# Patient Record
Sex: MALE | Race: WHITE | NOT HISPANIC OR LATINO | Employment: UNEMPLOYED | URBAN - METROPOLITAN AREA
[De-identification: names, ages, dates, MRNs, and addresses within clinical notes are randomized per-mention and may not be internally consistent; named-entity substitution may affect disease eponyms.]

---

## 2019-10-02 ENCOUNTER — OFFICE VISIT (OUTPATIENT)
Dept: FAMILY MEDICINE CLINIC | Facility: CLINIC | Age: 49
End: 2019-10-02
Payer: COMMERCIAL

## 2019-10-02 VITALS
BODY MASS INDEX: 26.98 KG/M2 | WEIGHT: 188 LBS | TEMPERATURE: 96.4 F | DIASTOLIC BLOOD PRESSURE: 84 MMHG | HEART RATE: 72 BPM | OXYGEN SATURATION: 94 % | SYSTOLIC BLOOD PRESSURE: 124 MMHG

## 2019-10-02 DIAGNOSIS — L40.9 PSORIASIS: ICD-10-CM

## 2019-10-02 DIAGNOSIS — J20.9 ACUTE BRONCHITIS, UNSPECIFIED ORGANISM: Primary | ICD-10-CM

## 2019-10-02 PROCEDURE — 99213 OFFICE O/P EST LOW 20 MIN: CPT | Performed by: PHYSICIAN ASSISTANT

## 2019-10-02 RX ORDER — TRIAMCINOLONE ACETONIDE 5 MG/G
CREAM TOPICAL 3 TIMES DAILY
Qty: 30 G | Refills: 3 | Status: SHIPPED | OUTPATIENT
Start: 2019-10-02 | End: 2020-03-27 | Stop reason: SDUPTHER

## 2019-10-02 RX ORDER — AMOXICILLIN AND CLAVULANATE POTASSIUM 875; 125 MG/1; MG/1
1 TABLET, FILM COATED ORAL EVERY 12 HOURS SCHEDULED
Qty: 20 TABLET | Refills: 0 | Status: SHIPPED | OUTPATIENT
Start: 2019-10-02 | End: 2019-10-12

## 2019-10-02 RX ORDER — FLUTICASONE PROPIONATE 110 UG/1
2 AEROSOL, METERED RESPIRATORY (INHALATION) 2 TIMES DAILY
Qty: 1 INHALER | Refills: 1 | Status: SHIPPED | OUTPATIENT
Start: 2019-10-02 | End: 2019-11-19 | Stop reason: SDUPTHER

## 2019-10-02 NOTE — PROGRESS NOTES
Assessment/Plan:       Diagnoses and all orders for this visit:    Acute bronchitis, unspecified organism  -     amoxicillin-clavulanate (AUGMENTIN) 875-125 mg per tablet; Take 1 tablet by mouth every 12 (twelve) hours for 10 days  -     fluticasone (FLOVENT HFA) 110 MCG/ACT inhaler; Inhale 2 puffs 2 (two) times a day Rinse mouth after use  Psoriasis  -     triamcinolone (KENALOG) 0 5 % cream; Apply topically 3 (three) times a day            Subjective:      Patient ID: Donta Noonan is a 44 y o  male  Cough   This is a new problem  The current episode started 1 to 4 weeks ago  The problem has been gradually worsening  The problem occurs every few minutes  The cough is productive of purulent sputum  Associated symptoms include chest pain, chills, ear congestion, a fever, headaches, nasal congestion, postnasal drip, a sore throat, shortness of breath and sweats  Pertinent negatives include no rhinorrhea  The symptoms are aggravated by other  He has tried OTC cough suppressant for the symptoms  The treatment provided mild relief  His past medical history is significant for bronchitis  Fatigue   This is a new problem  The current episode started 1 to 4 weeks ago  The problem occurs daily  The problem has been unchanged  Associated symptoms include chest pain, chills, congestion, coughing, fatigue, a fever, headaches and a sore throat  Pertinent negatives include no abdominal pain, arthralgias, change in bowel habit, nausea, neck pain, vertigo or visual change  The symptoms are aggravated by coughing  He has tried rest for the symptoms  The treatment provided no relief  The following portions of the patient's history were reviewed and updated as appropriate:   He has no past medical history on file  ,  does not have any pertinent problems on file  ,   has no past surgical history on file  ,  family history is not on file  ,   has no tobacco, alcohol, and drug history on file  ,  has No Known Allergies     Current Outpatient Medications   Medication Sig Dispense Refill    amoxicillin-clavulanate (AUGMENTIN) 875-125 mg per tablet Take 1 tablet by mouth every 12 (twelve) hours for 10 days 20 tablet 0    fluticasone (FLOVENT HFA) 110 MCG/ACT inhaler Inhale 2 puffs 2 (two) times a day Rinse mouth after use  1 Inhaler 1    triamcinolone (KENALOG) 0 5 % cream Apply topically 3 (three) times a day 30 g 3     No current facility-administered medications for this visit  Review of Systems   Constitutional: Positive for chills, fatigue and fever  HENT: Positive for congestion, postnasal drip and sore throat  Negative for rhinorrhea and trouble swallowing  Eyes: Negative for pain  Respiratory: Positive for cough, chest tightness and shortness of breath  Cardiovascular: Positive for chest pain  Negative for palpitations and leg swelling  Gastrointestinal: Negative for abdominal pain, change in bowel habit and nausea  Genitourinary: Negative for difficulty urinating  Musculoskeletal: Negative for arthralgias and neck pain  Neurological: Positive for headaches  Negative for dizziness, vertigo and light-headedness  Hematological: Negative  Psychiatric/Behavioral: Negative  Objective:  Vitals:    10/02/19 0912   BP: 124/84   Pulse: 72   Temp: (!) 96 4 °F (35 8 °C)   SpO2: 94%   Weight: 85 3 kg (188 lb)     Body mass index is 26 98 kg/m²  Physical Exam   Constitutional: He is oriented to person, place, and time  He appears well-developed and well-nourished  He appears ill  HENT:   Head: Normocephalic  Right Ear: External ear normal    Left Ear: External ear normal    Nose: Nose normal    Mouth/Throat: Oropharynx is clear and moist    Eyes: Conjunctivae are normal    Neck: Normal range of motion  Cardiovascular: Normal rate, regular rhythm and normal heart sounds  Pulmonary/Chest: Effort normal  He has wheezes  Musculoskeletal: Normal range of motion  Lymphadenopathy:     He has no cervical adenopathy  Neurological: He is alert and oriented to person, place, and time  Skin: Skin is warm and dry  Psychiatric: He has a normal mood and affect  His behavior is normal    Nursing note and vitals reviewed

## 2019-11-19 DIAGNOSIS — J20.9 ACUTE BRONCHITIS, UNSPECIFIED ORGANISM: ICD-10-CM

## 2019-11-19 RX ORDER — DEXAMETHASONE 4 MG/1
TABLET ORAL
Qty: 12 G | Refills: 1 | Status: SHIPPED | OUTPATIENT
Start: 2019-11-19 | End: 2020-01-10

## 2020-01-10 DIAGNOSIS — J20.9 ACUTE BRONCHITIS, UNSPECIFIED ORGANISM: ICD-10-CM

## 2020-01-10 RX ORDER — DEXAMETHASONE 4 MG/1
TABLET ORAL
Qty: 12 G | Refills: 0 | Status: SHIPPED | OUTPATIENT
Start: 2020-01-10 | End: 2020-02-13

## 2020-02-13 DIAGNOSIS — J20.9 ACUTE BRONCHITIS, UNSPECIFIED ORGANISM: ICD-10-CM

## 2020-02-13 RX ORDER — DEXAMETHASONE 4 MG/1
TABLET ORAL
Qty: 12 G | Refills: 0 | Status: SHIPPED | OUTPATIENT
Start: 2020-02-13 | End: 2020-03-17

## 2020-03-17 DIAGNOSIS — J20.9 ACUTE BRONCHITIS, UNSPECIFIED ORGANISM: ICD-10-CM

## 2020-03-17 RX ORDER — DEXAMETHASONE 4 MG/1
TABLET ORAL
Qty: 12 G | Refills: 0 | Status: SHIPPED | OUTPATIENT
Start: 2020-03-17 | End: 2020-04-14

## 2020-03-27 ENCOUNTER — TELEMEDICINE (OUTPATIENT)
Dept: FAMILY MEDICINE CLINIC | Facility: CLINIC | Age: 50
End: 2020-03-27
Payer: COMMERCIAL

## 2020-03-27 DIAGNOSIS — E78.2 MIXED HYPERLIPIDEMIA: ICD-10-CM

## 2020-03-27 DIAGNOSIS — R53.83 OTHER FATIGUE: ICD-10-CM

## 2020-03-27 DIAGNOSIS — L40.9 PSORIASIS: ICD-10-CM

## 2020-03-27 DIAGNOSIS — G47.09 OTHER INSOMNIA: ICD-10-CM

## 2020-03-27 DIAGNOSIS — Z12.11 SCREENING FOR COLON CANCER: ICD-10-CM

## 2020-03-27 DIAGNOSIS — K21.9 GASTROESOPHAGEAL REFLUX DISEASE WITHOUT ESOPHAGITIS: Primary | ICD-10-CM

## 2020-03-27 PROBLEM — J20.9 ACUTE BRONCHITIS: Status: RESOLVED | Noted: 2019-10-02 | Resolved: 2020-03-27

## 2020-03-27 PROCEDURE — G2062 QUAL NONMD EST PT 11-20M: HCPCS | Performed by: PHYSICIAN ASSISTANT

## 2020-03-27 RX ORDER — TRIAMCINOLONE ACETONIDE 5 MG/G
CREAM TOPICAL 3 TIMES DAILY
Qty: 30 G | Refills: 3 | Status: SHIPPED | OUTPATIENT
Start: 2020-03-27 | End: 2021-09-20 | Stop reason: SDUPTHER

## 2020-03-27 RX ORDER — OMEPRAZOLE 20 MG/1
20 CAPSULE, DELAYED RELEASE ORAL
Qty: 30 CAPSULE | Refills: 5 | Status: SHIPPED | OUTPATIENT
Start: 2020-03-27 | End: 2020-09-24

## 2020-03-27 NOTE — PROGRESS NOTES
Virtual Regular Visit    Problem List Items Addressed This Visit        Digestive    GERD (gastroesophageal reflux disease) - Primary    Relevant Medications    omeprazole (PriLOSEC) 20 mg delayed release capsule       Musculoskeletal and Integument    Psoriasis    Relevant Medications    triamcinolone (KENALOG) 0 5 % cream    Other Relevant Orders    CBC and differential       Other    Hyperlipidemia    Relevant Orders    Comprehensive metabolic panel    Lipid Panel with Direct LDL reflex    Insomnia      Other Visit Diagnoses     Screening for colon cancer        Relevant Orders    Ambulatory referral to Gastroenterology               Reason for visit is follow-up of medications    Encounter provider Alfonza Lesch, PA-C    Provider located at [facility] [address] [facility]  [address] [place] [address],[address] [place] [address] [phone]      Recent Visits  No visits were found meeting these conditions  Showing recent visits within past 7 days and meeting all other requirements     Today's Visits  Date Type Provider Dept   03/27/20 Telemedicine Alfonza Lesch, PA-C  [facility] [address] [place]   Showing today's visits and meeting all other requirements     Future Appointments  No visits were found meeting these conditions  Showing future appointments within next 150 days and meeting all other requirements        After connecting through [employer], the patient was identified by name and date of birth  Tricia Stanley was informed that this is a telemedicine visit and that the visit is being conducted through [employer] [employer] and patient was informed that this is not a secure, HIPAA-complaint platform  he agrees to proceed  which may not be secure and therefore, might not be HIPAA-compliant  My office door was closed  No one else was in the room  He acknowledged consent and understanding of privacy and security of the video platform   The patient has agreed to participate and understands they can discontinue the visit at any time  Subjective  Dossie Kanner is a 44 y o  male patient is for follow-up of medications  Patient has noticed that he is a little bit more tired and has some groin pain  He did have a tick bite that he removed the tick easily but since then he has felt tired  The groin pain is worse when he is coughing and he has had another episode of a bad cough over the past several weeks that has improved  His heartburn and indigestion is worsening and he would like to go back on omeprazole  He is taking Z Quil at night and has been sleeping better  No past medical history on file  No past surgical history on file  Current Outpatient Medications   Medication Sig Dispense Refill    FLOVENT  MCG/ACT inhaler inhale 2 puffs by mouth and INTO THE LUNGS twice a day (RINSE MOUTH AFTER USE) 12 g 0    triamcinolone (KENALOG) 0 5 % cream Apply topically 3 (three) times a day 30 g 3    omeprazole (PriLOSEC) 20 mg delayed release capsule Take 1 capsule (20 mg total) by mouth daily before breakfast 30 capsule 5     No current facility-administered medications for this visit  No Known Allergies    Review of Systems   Constitutional: Positive for fatigue  Negative for activity change, appetite change, chills and fever  HENT: Positive for congestion and postnasal drip  Negative for ear pain, sinus pressure, sore throat and trouble swallowing  Eyes: Negative for pain  Respiratory: Positive for cough  Negative for chest tightness and shortness of breath  Cardiovascular: Negative for chest pain and palpitations  Gastrointestinal: Negative for constipation, diarrhea and nausea  Groin pain   Genitourinary: Negative for difficulty urinating  Musculoskeletal: Negative for arthralgias, back pain and neck pain  Skin: Negative for rash  Neurological: Negative for dizziness, light-headedness and headaches  Psychiatric/Behavioral: Positive for sleep disturbance  Physical Exam   Constitutional: He is oriented to person, place, and time  He appears well-developed and well-nourished  HENT:   Head: Normocephalic  Right Ear: External ear normal    Left Ear: External ear normal    Neck: Normal range of motion  Cardiovascular: Normal rate and regular rhythm  Pulmonary/Chest: Effort normal    Musculoskeletal: Normal range of motion  He exhibits no edema  Neurological: He is alert and oriented to person, place, and time  Psychiatric: He has a normal mood and affect  His behavior is normal  Judgment and thought content normal         I spent 15 minutes with the patient during this visit

## 2020-04-14 DIAGNOSIS — J20.9 ACUTE BRONCHITIS, UNSPECIFIED ORGANISM: ICD-10-CM

## 2020-04-14 RX ORDER — DEXAMETHASONE 4 MG/1
TABLET ORAL
Qty: 12 G | Refills: 0 | Status: SHIPPED | OUTPATIENT
Start: 2020-04-14 | End: 2020-05-11

## 2020-04-21 ENCOUNTER — APPOINTMENT (OUTPATIENT)
Dept: LAB | Facility: CLINIC | Age: 50
End: 2020-04-21
Payer: COMMERCIAL

## 2020-04-21 DIAGNOSIS — E78.2 MIXED HYPERLIPIDEMIA: ICD-10-CM

## 2020-04-21 DIAGNOSIS — R53.83 OTHER FATIGUE: ICD-10-CM

## 2020-04-21 DIAGNOSIS — L40.9 PSORIASIS: ICD-10-CM

## 2020-04-21 LAB
ALBUMIN SERPL BCP-MCNC: 3.4 G/DL (ref 3.5–5)
ALP SERPL-CCNC: 76 U/L (ref 46–116)
ALT SERPL W P-5'-P-CCNC: 23 U/L (ref 12–78)
ANION GAP SERPL CALCULATED.3IONS-SCNC: 5 MMOL/L (ref 4–13)
AST SERPL W P-5'-P-CCNC: 13 U/L (ref 5–45)
BASOPHILS # BLD AUTO: 0.05 THOUSANDS/ΜL (ref 0–0.1)
BASOPHILS NFR BLD AUTO: 1 % (ref 0–1)
BILIRUB SERPL-MCNC: 0.43 MG/DL (ref 0.2–1)
BUN SERPL-MCNC: 14 MG/DL (ref 5–25)
CALCIUM SERPL-MCNC: 9 MG/DL (ref 8.3–10.1)
CHLORIDE SERPL-SCNC: 110 MMOL/L (ref 100–108)
CHOLEST SERPL-MCNC: 208 MG/DL (ref 50–200)
CO2 SERPL-SCNC: 27 MMOL/L (ref 21–32)
CREAT SERPL-MCNC: 0.9 MG/DL (ref 0.6–1.3)
EOSINOPHIL # BLD AUTO: 0.11 THOUSAND/ΜL (ref 0–0.61)
EOSINOPHIL NFR BLD AUTO: 1 % (ref 0–6)
ERYTHROCYTE [DISTWIDTH] IN BLOOD BY AUTOMATED COUNT: 14.1 % (ref 11.6–15.1)
GFR SERPL CREATININE-BSD FRML MDRD: 98 ML/MIN/1.73SQ M
GLUCOSE P FAST SERPL-MCNC: 91 MG/DL (ref 65–99)
HCT VFR BLD AUTO: 49.8 % (ref 36.5–49.3)
HDLC SERPL-MCNC: 28 MG/DL
HGB BLD-MCNC: 16 G/DL (ref 12–17)
IMM GRANULOCYTES # BLD AUTO: 0.02 THOUSAND/UL (ref 0–0.2)
IMM GRANULOCYTES NFR BLD AUTO: 0 % (ref 0–2)
LDLC SERPL CALC-MCNC: 148 MG/DL (ref 0–100)
LYMPHOCYTES # BLD AUTO: 2.52 THOUSANDS/ΜL (ref 0.6–4.47)
LYMPHOCYTES NFR BLD AUTO: 25 % (ref 14–44)
MCH RBC QN AUTO: 31.4 PG (ref 26.8–34.3)
MCHC RBC AUTO-ENTMCNC: 32.1 G/DL (ref 31.4–37.4)
MCV RBC AUTO: 98 FL (ref 82–98)
MONOCYTES # BLD AUTO: 0.56 THOUSAND/ΜL (ref 0.17–1.22)
MONOCYTES NFR BLD AUTO: 6 % (ref 4–12)
NEUTROPHILS # BLD AUTO: 6.69 THOUSANDS/ΜL (ref 1.85–7.62)
NEUTS SEG NFR BLD AUTO: 67 % (ref 43–75)
NRBC BLD AUTO-RTO: 0 /100 WBCS
PLATELET # BLD AUTO: 221 THOUSANDS/UL (ref 149–390)
PMV BLD AUTO: 10.1 FL (ref 8.9–12.7)
POTASSIUM SERPL-SCNC: 3.9 MMOL/L (ref 3.5–5.3)
PROT SERPL-MCNC: 7 G/DL (ref 6.4–8.2)
RBC # BLD AUTO: 5.09 MILLION/UL (ref 3.88–5.62)
SODIUM SERPL-SCNC: 142 MMOL/L (ref 136–145)
TRIGL SERPL-MCNC: 161 MG/DL
WBC # BLD AUTO: 9.95 THOUSAND/UL (ref 4.31–10.16)

## 2020-04-21 PROCEDURE — 86618 LYME DISEASE ANTIBODY: CPT

## 2020-04-21 PROCEDURE — 36415 COLL VENOUS BLD VENIPUNCTURE: CPT

## 2020-04-21 PROCEDURE — 80053 COMPREHEN METABOLIC PANEL: CPT

## 2020-04-21 PROCEDURE — 80061 LIPID PANEL: CPT

## 2020-04-21 PROCEDURE — 85025 COMPLETE CBC W/AUTO DIFF WBC: CPT

## 2020-04-22 ENCOUNTER — TELEMEDICINE (OUTPATIENT)
Dept: GASTROENTEROLOGY | Facility: CLINIC | Age: 50
End: 2020-04-22
Payer: COMMERCIAL

## 2020-04-22 DIAGNOSIS — Z12.11 COLON CANCER SCREENING: Primary | ICD-10-CM

## 2020-04-22 LAB — B BURGDOR IGG+IGM SER-ACNC: <0.91 ISR (ref 0–0.9)

## 2020-04-22 PROCEDURE — 99211 OFF/OP EST MAY X REQ PHY/QHP: CPT | Performed by: INTERNAL MEDICINE

## 2020-05-09 DIAGNOSIS — J20.9 ACUTE BRONCHITIS, UNSPECIFIED ORGANISM: ICD-10-CM

## 2020-05-11 RX ORDER — DEXAMETHASONE 4 MG/1
TABLET ORAL
Qty: 12 G | Refills: 0 | Status: SHIPPED | OUTPATIENT
Start: 2020-05-11 | End: 2020-06-26

## 2020-05-20 ENCOUNTER — TRANSCRIBE ORDERS (OUTPATIENT)
Dept: GASTROENTEROLOGY | Facility: CLINIC | Age: 50
End: 2020-05-20

## 2020-05-20 DIAGNOSIS — Z20.822 ENCOUNTER FOR LABORATORY TESTING FOR COVID-19 VIRUS: Primary | ICD-10-CM

## 2020-05-29 DIAGNOSIS — Z20.822 ENCOUNTER FOR LABORATORY TESTING FOR COVID-19 VIRUS: ICD-10-CM

## 2020-05-29 PROCEDURE — U0003 INFECTIOUS AGENT DETECTION BY NUCLEIC ACID (DNA OR RNA); SEVERE ACUTE RESPIRATORY SYNDROME CORONAVIRUS 2 (SARS-COV-2) (CORONAVIRUS DISEASE [COVID-19]), AMPLIFIED PROBE TECHNIQUE, MAKING USE OF HIGH THROUGHPUT TECHNOLOGIES AS DESCRIBED BY CMS-2020-01-R: HCPCS

## 2020-05-30 LAB — SARS-COV-2 RNA SPEC QL NAA+PROBE: NOT DETECTED

## 2020-06-02 ENCOUNTER — TRANSCRIBE ORDERS (OUTPATIENT)
Dept: GASTROENTEROLOGY | Facility: CLINIC | Age: 50
End: 2020-06-02

## 2020-06-02 ENCOUNTER — TELEPHONE (OUTPATIENT)
Dept: GASTROENTEROLOGY | Facility: CLINIC | Age: 50
End: 2020-06-02

## 2020-06-02 DIAGNOSIS — Z20.822 ENCOUNTER FOR LABORATORY TESTING FOR COVID-19 VIRUS: Primary | ICD-10-CM

## 2020-06-09 ENCOUNTER — ANESTHESIA (OUTPATIENT)
Dept: GASTROENTEROLOGY | Facility: HOSPITAL | Age: 50
End: 2020-06-09

## 2020-06-09 ENCOUNTER — ANESTHESIA EVENT (OUTPATIENT)
Dept: GASTROENTEROLOGY | Facility: HOSPITAL | Age: 50
End: 2020-06-09

## 2020-06-09 ENCOUNTER — HOSPITAL ENCOUNTER (OUTPATIENT)
Dept: GASTROENTEROLOGY | Facility: HOSPITAL | Age: 50
Setting detail: OUTPATIENT SURGERY
Discharge: HOME | End: 2020-06-09
Attending: INTERNAL MEDICINE | Admitting: INTERNAL MEDICINE
Payer: COMMERCIAL

## 2020-06-09 VITALS
OXYGEN SATURATION: 98 % | SYSTOLIC BLOOD PRESSURE: 115 MMHG | DIASTOLIC BLOOD PRESSURE: 72 MMHG | RESPIRATION RATE: 17 BRPM | TEMPERATURE: 98.4 F | HEART RATE: 62 BPM | HEIGHT: 70 IN | BODY MASS INDEX: 25.75 KG/M2 | WEIGHT: 179.9 LBS

## 2020-06-09 DIAGNOSIS — Z12.11 COLON CANCER SCREENING: ICD-10-CM

## 2020-06-09 LAB — SARS-COV-2 RNA RESP QL NAA+PROBE: NEGATIVE

## 2020-06-09 PROCEDURE — 45385 COLONOSCOPY W/LESION REMOVAL: CPT | Performed by: INTERNAL MEDICINE

## 2020-06-09 PROCEDURE — 88305 TISSUE EXAM BY PATHOLOGIST: CPT | Performed by: PATHOLOGY

## 2020-06-09 PROCEDURE — 87635 SARS-COV-2 COVID-19 AMP PRB: CPT | Performed by: ANESTHESIOLOGY

## 2020-06-09 RX ORDER — PROPOFOL 10 MG/ML
INJECTION, EMULSION INTRAVENOUS AS NEEDED
Status: DISCONTINUED | OUTPATIENT
Start: 2020-06-09 | End: 2020-06-09 | Stop reason: SURG

## 2020-06-09 RX ORDER — LIDOCAINE HYDROCHLORIDE 10 MG/ML
INJECTION, SOLUTION EPIDURAL; INFILTRATION; INTRACAUDAL; PERINEURAL AS NEEDED
Status: DISCONTINUED | OUTPATIENT
Start: 2020-06-09 | End: 2020-06-09 | Stop reason: SURG

## 2020-06-09 RX ORDER — SODIUM CHLORIDE, SODIUM LACTATE, POTASSIUM CHLORIDE, CALCIUM CHLORIDE 600; 310; 30; 20 MG/100ML; MG/100ML; MG/100ML; MG/100ML
INJECTION, SOLUTION INTRAVENOUS CONTINUOUS PRN
Status: DISCONTINUED | OUTPATIENT
Start: 2020-06-09 | End: 2020-06-09 | Stop reason: SURG

## 2020-06-09 RX ADMIN — PROPOFOL 50 MG: 10 INJECTION, EMULSION INTRAVENOUS at 11:13

## 2020-06-09 RX ADMIN — LIDOCAINE HYDROCHLORIDE 50 MG: 10 INJECTION, SOLUTION EPIDURAL; INFILTRATION; INTRACAUDAL; PERINEURAL at 11:12

## 2020-06-09 RX ADMIN — PROPOFOL 50 MG: 10 INJECTION, EMULSION INTRAVENOUS at 11:15

## 2020-06-09 RX ADMIN — PROPOFOL 100 MG: 10 INJECTION, EMULSION INTRAVENOUS at 11:12

## 2020-06-09 RX ADMIN — PROPOFOL 50 MG: 10 INJECTION, EMULSION INTRAVENOUS at 11:20

## 2020-06-09 RX ADMIN — SODIUM CHLORIDE, SODIUM LACTATE, POTASSIUM CHLORIDE, AND CALCIUM CHLORIDE: .6; .31; .03; .02 INJECTION, SOLUTION INTRAVENOUS at 10:57

## 2020-06-09 RX ADMIN — PROPOFOL 50 MG: 10 INJECTION, EMULSION INTRAVENOUS at 11:23

## 2020-06-26 DIAGNOSIS — J20.9 ACUTE BRONCHITIS, UNSPECIFIED ORGANISM: ICD-10-CM

## 2020-06-26 RX ORDER — DEXAMETHASONE 4 MG/1
TABLET ORAL
Qty: 12 G | Refills: 0 | Status: SHIPPED | OUTPATIENT
Start: 2020-06-26 | End: 2020-07-28

## 2020-07-14 ENCOUNTER — TELEPHONE (OUTPATIENT)
Dept: GASTROENTEROLOGY | Facility: CLINIC | Age: 50
End: 2020-07-14

## 2020-07-28 DIAGNOSIS — J20.9 ACUTE BRONCHITIS, UNSPECIFIED ORGANISM: ICD-10-CM

## 2020-07-28 RX ORDER — DEXAMETHASONE 4 MG/1
TABLET ORAL
Qty: 12 G | Refills: 0 | Status: SHIPPED | OUTPATIENT
Start: 2020-07-28 | End: 2020-08-26

## 2020-08-26 DIAGNOSIS — J20.9 ACUTE BRONCHITIS, UNSPECIFIED ORGANISM: ICD-10-CM

## 2020-08-26 RX ORDER — DEXAMETHASONE 4 MG/1
TABLET ORAL
Qty: 12 G | Refills: 0 | Status: SHIPPED | OUTPATIENT
Start: 2020-08-26 | End: 2020-09-24

## 2020-09-24 DIAGNOSIS — K21.9 GASTROESOPHAGEAL REFLUX DISEASE WITHOUT ESOPHAGITIS: ICD-10-CM

## 2020-09-24 DIAGNOSIS — J20.9 ACUTE BRONCHITIS, UNSPECIFIED ORGANISM: ICD-10-CM

## 2020-09-24 RX ORDER — DEXAMETHASONE 4 MG/1
TABLET ORAL
Qty: 12 G | Refills: 0 | Status: SHIPPED | OUTPATIENT
Start: 2020-09-24 | End: 2020-10-27

## 2020-09-24 RX ORDER — OMEPRAZOLE 20 MG/1
CAPSULE, DELAYED RELEASE ORAL
Qty: 30 CAPSULE | Refills: 5 | Status: SHIPPED | OUTPATIENT
Start: 2020-09-24 | End: 2021-09-20 | Stop reason: SDUPTHER

## 2020-10-27 DIAGNOSIS — J20.9 ACUTE BRONCHITIS, UNSPECIFIED ORGANISM: ICD-10-CM

## 2020-10-27 RX ORDER — DEXAMETHASONE 4 MG/1
TABLET ORAL
Qty: 12 G | Refills: 0 | Status: SHIPPED | OUTPATIENT
Start: 2020-10-27 | End: 2020-12-02

## 2020-12-02 DIAGNOSIS — J20.9 ACUTE BRONCHITIS, UNSPECIFIED ORGANISM: ICD-10-CM

## 2020-12-02 RX ORDER — DEXAMETHASONE 4 MG/1
TABLET ORAL
Qty: 12 G | Refills: 0 | Status: SHIPPED | OUTPATIENT
Start: 2020-12-02 | End: 2020-12-30

## 2020-12-30 DIAGNOSIS — J20.9 ACUTE BRONCHITIS, UNSPECIFIED ORGANISM: ICD-10-CM

## 2020-12-30 RX ORDER — DEXAMETHASONE 4 MG/1
TABLET ORAL
Qty: 12 G | Refills: 0 | Status: SHIPPED | OUTPATIENT
Start: 2020-12-30 | End: 2021-01-27

## 2021-01-27 DIAGNOSIS — J20.9 ACUTE BRONCHITIS, UNSPECIFIED ORGANISM: ICD-10-CM

## 2021-01-27 RX ORDER — DEXAMETHASONE 4 MG/1
TABLET ORAL
Qty: 12 G | Refills: 0 | Status: SHIPPED | OUTPATIENT
Start: 2021-01-27 | End: 2021-03-22

## 2021-03-21 DIAGNOSIS — J20.9 ACUTE BRONCHITIS, UNSPECIFIED ORGANISM: ICD-10-CM

## 2021-03-22 RX ORDER — DEXAMETHASONE 4 MG/1
TABLET ORAL
Qty: 12 G | Refills: 0 | Status: SHIPPED | OUTPATIENT
Start: 2021-03-22 | End: 2021-09-20 | Stop reason: SDUPTHER

## 2021-04-19 DIAGNOSIS — J20.9 ACUTE BRONCHITIS, UNSPECIFIED ORGANISM: ICD-10-CM

## 2021-04-19 RX ORDER — DEXAMETHASONE 4 MG/1
TABLET ORAL
Qty: 12 G | Refills: 0 | OUTPATIENT
Start: 2021-04-19

## 2021-07-19 ENCOUNTER — TELEPHONE (OUTPATIENT)
Dept: GASTROENTEROLOGY | Facility: CLINIC | Age: 51
End: 2021-07-19

## 2021-09-15 ENCOUNTER — TELEPHONE (OUTPATIENT)
Dept: FAMILY MEDICINE CLINIC | Facility: CLINIC | Age: 51
End: 2021-09-15

## 2021-09-15 NOTE — TELEPHONE ENCOUNTER
Pt is scheduled for appt for med refill with [facility] on 9/20  Per pts wife, there is a new baby coming in the family and as a result, pt is requesting a TDAP vaccine  Is it ok for him to have this at his visit on 9/20?

## 2021-09-20 ENCOUNTER — OFFICE VISIT (OUTPATIENT)
Dept: FAMILY MEDICINE CLINIC | Facility: CLINIC | Age: 51
End: 2021-09-20
Payer: COMMERCIAL

## 2021-09-20 VITALS
OXYGEN SATURATION: 97 % | WEIGHT: 193 LBS | HEART RATE: 73 BPM | SYSTOLIC BLOOD PRESSURE: 142 MMHG | TEMPERATURE: 97.6 F | BODY MASS INDEX: 27.63 KG/M2 | DIASTOLIC BLOOD PRESSURE: 82 MMHG | HEIGHT: 70 IN

## 2021-09-20 DIAGNOSIS — Z23 NEED FOR TDAP VACCINATION: ICD-10-CM

## 2021-09-20 DIAGNOSIS — K40.90 LEFT INGUINAL HERNIA: ICD-10-CM

## 2021-09-20 DIAGNOSIS — J20.9 ACUTE BRONCHITIS, UNSPECIFIED ORGANISM: ICD-10-CM

## 2021-09-20 DIAGNOSIS — K21.9 GASTROESOPHAGEAL REFLUX DISEASE WITHOUT ESOPHAGITIS: ICD-10-CM

## 2021-09-20 DIAGNOSIS — E78.2 MIXED HYPERLIPIDEMIA: ICD-10-CM

## 2021-09-20 DIAGNOSIS — L40.9 PSORIASIS: ICD-10-CM

## 2021-09-20 DIAGNOSIS — Z13.1 SCREENING FOR DIABETES MELLITUS: ICD-10-CM

## 2021-09-20 DIAGNOSIS — Z00.00 ANNUAL PHYSICAL EXAM: Primary | ICD-10-CM

## 2021-09-20 DIAGNOSIS — Z12.5 SCREENING FOR PROSTATE CANCER: ICD-10-CM

## 2021-09-20 PROCEDURE — 90715 TDAP VACCINE 7 YRS/> IM: CPT | Performed by: PHYSICIAN ASSISTANT

## 2021-09-20 PROCEDURE — 3008F BODY MASS INDEX DOCD: CPT | Performed by: PHYSICIAN ASSISTANT

## 2021-09-20 PROCEDURE — 99396 PREV VISIT EST AGE 40-64: CPT | Performed by: PHYSICIAN ASSISTANT

## 2021-09-20 PROCEDURE — 4004F PT TOBACCO SCREEN RCVD TLK: CPT | Performed by: PHYSICIAN ASSISTANT

## 2021-09-20 PROCEDURE — 3725F SCREEN DEPRESSION PERFORMED: CPT | Performed by: PHYSICIAN ASSISTANT

## 2021-09-20 PROCEDURE — 90471 IMMUNIZATION ADMIN: CPT | Performed by: PHYSICIAN ASSISTANT

## 2021-09-20 RX ORDER — TRIAMCINOLONE ACETONIDE 5 MG/G
CREAM TOPICAL 3 TIMES DAILY
Qty: 30 G | Refills: 3 | Status: SHIPPED | OUTPATIENT
Start: 2021-09-20

## 2021-09-20 RX ORDER — OMEPRAZOLE 20 MG/1
20 CAPSULE, DELAYED RELEASE ORAL DAILY
Qty: 30 CAPSULE | Refills: 5 | Status: SHIPPED | OUTPATIENT
Start: 2021-09-20 | End: 2022-04-19

## 2021-09-20 RX ORDER — DEXAMETHASONE 4 MG/1
2 TABLET ORAL 2 TIMES DAILY
Qty: 12 G | Refills: 0 | Status: SHIPPED | OUTPATIENT
Start: 2021-09-20 | End: 2021-10-27

## 2021-09-20 NOTE — PROGRESS NOTES
[facility] [address] [place]     NAME: Fide Humphrey  AGE: 46 y o  SEX: male  : 1970     DATE: 2021     Assessment and Plan:     Problem List Items Addressed This Visit        Digestive    GERD (gastroesophageal reflux disease)    Relevant Medications    omeprazole (PriLOSEC) 20 mg delayed release capsule       Musculoskeletal and Integument    Psoriasis    Relevant Medications    triamcinolone (KENALOG) 0 5 % cream    Other Relevant Orders    CBC and Platelet       Other    Hyperlipidemia    Relevant Orders    Lipid Panel with Direct LDL reflex    Comprehensive metabolic panel    Annual physical exam - Primary    Left inguinal hernia    Relevant Orders    Ambulatory referral to General Surgery      Other Visit Diagnoses     Acute bronchitis, unspecified organism        Relevant Medications    fluticasone (Flovent HFA) 110 MCG/ACT inhaler    Need for Tdap vaccination        Relevant Orders    TDAP VACCINE GREATER THAN OR EQUAL TO 6YO IM    Screening for prostate cancer        Relevant Orders    PSA, Total Screen    Screening for diabetes mellitus        Relevant Orders    Comprehensive metabolic panel          Immunizations and preventive care screenings were discussed with patient today  Appropriate education was printed on patient's after visit summary  Counseling:  Dental Health: discussed importance of regular tooth brushing, flossing, and dental visits  Injury prevention: discussed safety/seat belts, safety helmets, smoke detectors, carbon dioxide detectors, and smoking near bedding or upholstery  · Exercise: the importance of regular exercise/physical activity was discussed  Recommend exercise 3-5 times per week for at least 30 minutes  BMI Counseling: Body mass index is 27 69 kg/m²   The BMI is above normal  Nutrition recommendations include encouraging healthy choices of fruits and vegetables, consuming healthier snacks, limiting drinks that contain sugar, moderation in carbohydrate intake, increasing intake of lean protein, reducing intake of saturated and trans fat and reducing intake of cholesterol  Exercise recommendations include exercising 3-5 times per week  No pharmacotherapy was ordered  Rationale for BMI follow-up plan is due to patient being overweight or obese  Depression Screening and Follow-up Plan:   Patient was screened for depression during today's encounter  They screened negative with a PHQ-2 score of 0  Tobacco Cessation Counseling: Tobacco cessation counseling was provided  The patient is sincerely urged to quit consumption of tobacco  He is not ready to quit tobacco  Medication options and side effects of medication not discussed  Patient refused medication  No follow-ups on file  Chief Complaint:     Chief Complaint   Patient presents with    Annual Exam     Pt presents today for an annual physical exam      Immunizations     Pt needs TDAP vaccine   Hernia     Pt states that he has a hernia in his groin  History of Present Illness:     Adult Annual Physical   Patient here for a comprehensive physical exam  The patient reports feeling well but thinks he has a hernia on left side  Diet and Physical Activity  · Diet/Nutrition: well balanced diet, low calorie diet and low carb diet  · Exercise: walking  Depression Screening  PHQ-9 Depression Screening    PHQ-9:   Frequency of the following problems over the past two weeks:      Little interest or pleasure in doing things: 0 - not at all  Feeling down, depressed, or hopeless: 0 - not at all  PHQ-2 Score: 0       General Health  · Sleep: sleeps with OTC sleep aid  · Hearing: normal - bilateral   · Vision: goes for regular eye exams and wears glasses  · Dental: dentures          Health  · Symptoms include: none     Review of Systems:     Review of Systems   Constitutional: Negative for appetite change, fatigue, fever and unexpected weight change  HENT: Negative for dental problem, ear pain, hearing loss, mouth sores, nosebleeds, rhinorrhea and trouble swallowing  Eyes: Negative for photophobia, pain, discharge and visual disturbance  Respiratory: Negative for cough, chest tightness, shortness of breath and wheezing  Cardiovascular: Negative for chest pain and palpitations  Gastrointestinal: Negative for abdominal pain, blood in stool, constipation, diarrhea, nausea, rectal pain and vomiting  Endocrine: Negative for cold intolerance, polydipsia, polyphagia and polyuria  Genitourinary: Negative for decreased urine volume, difficulty urinating, dysuria, frequency, hematuria, scrotal swelling, testicular pain and urgency  Musculoskeletal: Negative for arthralgias, back pain, gait problem, joint swelling, myalgias, neck pain and neck stiffness  Skin: Negative for color change and rash  Allergic/Immunologic: Negative for environmental allergies, food allergies and immunocompromised state  Neurological: Negative for dizziness, seizures, speech difficulty, light-headedness and headaches  Hematological: Negative for adenopathy  Does not bruise/bleed easily  Psychiatric/Behavioral: Negative for behavioral problems, confusion, decreased concentration, self-injury and sleep disturbance  The patient is not nervous/anxious and is not hyperactive         Past Medical History:     Past Medical History:   Diagnosis Date    Chronic kidney disease     Erectile dysfunction     Psoriasis       Past Surgical History:     Past Surgical History:   Procedure Laterality Date    HAND SURGERY Right     TONSILLECTOMY        Family History:     Family History   Problem Relation Age of Onset    Diabetes Mother     Prostate cancer Father       Social History:     Social History     Socioeconomic History    Marital status: /Civil Union     Spouse name: None    Number of children: None    Years of education: None    Highest education level: None   Occupational History    None   Tobacco Use    Smoking status: Current Every Day Smoker     Packs/day: 1 50     Years: 35 00     Pack years: 52 50    Smokeless tobacco: Former User   Vaping Use    Vaping Use: Former    Quit date: 6/9/1995   Substance and Sexual Activity    Alcohol use: Not Currently    Drug use: Not Currently    Sexual activity: None   Other Topics Concern    None   Social History Narrative    None     Social Determinants of Health     Financial Resource Strain:     Difficulty of Paying Living Expenses:    Food Insecurity:     Worried About Running Out of Food in the Last Year:     Ran Out of Food in the Last Year:    Transportation Needs:     Lack of Transportation (Medical):  Lack of Transportation (Non-Medical):    Physical Activity:     Days of Exercise per Week:     Minutes of Exercise per Session:    Stress:     Feeling of Stress :    Social Connections:     Frequency of Communication with Friends and Family:     Frequency of Social Gatherings with Friends and Family:     Attends Bahai Services:     Active Member of Clubs or Organizations:     Attends Club or Organization Meetings:     Marital Status:    Intimate Partner Violence:     Fear of Current or Ex-Partner:     Emotionally Abused:     Physically Abused:     Sexually Abused:       Current Medications:     Current Outpatient Medications   Medication Sig Dispense Refill    fluticasone (Flovent HFA) 110 MCG/ACT inhaler Inhale 2 puffs 2 (two) times a day Rinse mouth after use  12 g 0    omeprazole (PriLOSEC) 20 mg delayed release capsule Take 1 capsule (20 mg total) by mouth daily 30 capsule 5    triamcinolone (KENALOG) 0 5 % cream Apply topically 3 (three) times a day 30 g 3     No current facility-administered medications for this visit        Allergies:     No Known Allergies   Physical Exam:     /82 (BP Location: Left arm, Patient Position: Sitting, Cuff Size: Adult)   Pulse 73   Temp 97 6 °F (36 4 °C) (Tympanic)   Ht 5' 10" (1 778 m)   Wt 87 5 kg (193 lb)   SpO2 97%   BMI 27 69 kg/m²     Physical Exam  Vitals and nursing note reviewed  Constitutional:       Appearance: Normal appearance  He is well-developed  HENT:      Head: Normocephalic  Right Ear: Hearing, tympanic membrane, ear canal and external ear normal       Left Ear: Hearing, tympanic membrane, ear canal and external ear normal       Nose: Nose normal       Mouth/Throat:      Mouth: Mucous membranes are moist       Pharynx: Oropharynx is clear  Uvula midline  Eyes:      Extraocular Movements: Extraocular movements intact  Conjunctiva/sclera: Conjunctivae normal    Neck:      Thyroid: No thyromegaly  Vascular: No carotid bruit  Cardiovascular:      Rate and Rhythm: Normal rate and regular rhythm  Pulses: Normal pulses  Heart sounds: Normal heart sounds  Pulmonary:      Effort: Pulmonary effort is normal       Breath sounds: Wheezing present  Abdominal:      General: Bowel sounds are normal       Palpations: Abdomen is soft  There is no mass  Tenderness: There is no abdominal tenderness  There is no right CVA tenderness or left CVA tenderness  Genitourinary:     Comments: Reducible hernia left inguina area  Musculoskeletal:         General: Normal range of motion  Cervical back: Normal range of motion and neck supple  Lymphadenopathy:      Cervical: No cervical adenopathy  Skin:     General: Skin is warm and dry  Capillary Refill: Capillary refill takes less than 2 seconds  Neurological:      General: No focal deficit present  Mental Status: He is alert and oriented to person, place, and time  Psychiatric:         Mood and Affect: Mood normal          Behavior: Behavior normal          Thought Content:  Thought content normal          Judgment: Judgment normal           Dalton Candelario PA-C  [address] [place] [address] PRACTICE [address]

## 2021-09-20 NOTE — PATIENT INSTRUCTIONS
Wellness Visit for Adults   AMBULATORY CARE:   A wellness visit  is when you see your healthcare provider to get screened for health problems  Your healthcare provider will also give you advice on how to stay healthy  Write down your questions so you remember to ask them  Ask your healthcare provider how often you should have a wellness visit  What happens at a wellness visit:  Your healthcare provider will ask about your health, and your family history of health problems  This includes high blood pressure, heart disease, and cancer  He or she will ask if you have symptoms that concern you, if you smoke, and about your mood  You may also be asked about your intake of medicines, supplements, food, and alcohol  Any of the following may be done:  · Your weight  will be checked  Your height may also be checked so your body mass index (BMI) can be calculated  Your BMI shows if you are at a healthy weight  · Your blood pressure  and heart rate will be checked  Your temperature may also be checked  · Blood and urine tests  may be done  Blood tests may be done to check your cholesterol levels  Abnormal cholesterol levels increase your risk for heart disease and stroke  You may also need a blood or urine test to check for diabetes if you are at increased risk  Urine tests may be done to look for signs of an infection or kidney disease  · A physical exam  includes checking your heartbeat and lungs with a stethoscope  Your healthcare provider may also check your skin to look for sun damage  · Screening tests  may be recommended  A screening test is done to check for diseases that may not cause symptoms  The screening tests you may need depend on your age, gender, family history, and lifestyle habits  For example, colorectal screening may be recommended if you are 48years old or older  Screening tests you need if you are a woman:   · A Pap smear  is used to screen for cervical cancer   Pap smears are usually done every 3 to 5 years depending on your age  You may need them more often if you have had abnormal Pap smear test results in the past  Ask your healthcare provider how often you should have a Pap smear  · A mammogram  is an x-ray of your breasts to screen for breast cancer  Experts recommend mammograms every 2 years starting at age 48 years  You may need a mammogram at age 52 years or younger if you have an increased risk for breast cancer  Talk to your healthcare provider about when you should start having mammograms and how often you need them  Vaccines you may need:   · Get an influenza vaccine  every year  The influenza vaccine protects you from the flu  Several types of viruses cause the flu  The viruses change over time, so new vaccines are made each year  · Get a tetanus-diphtheria (Td) booster vaccine  every 10 years  This vaccine protects you against tetanus and diphtheria  Tetanus is a severe infection that may cause painful muscle spasms and lockjaw  Diphtheria is a severe bacterial infection that causes a thick covering in the back of your mouth and throat  · Get a human papillomavirus (HPV) vaccine  if you are female and aged 23 to 32 or male 23 to 24 and never received it  This vaccine protects you from HPV infection  HPV is the most common infection spread by sexual contact  HPV may also cause vaginal, penile, and anal cancers  · Get a pneumococcal vaccine  if you are aged 72 years or older  The pneumococcal vaccine is an injection given to protect you from pneumococcal disease  Pneumococcal disease is an infection caused by pneumococcal bacteria  The infection may cause pneumonia, meningitis, or an ear infection  · Get a shingles vaccine  if you are 60 or older, even if you have had shingles before  The shingles vaccine is an injection to protect you from the varicella-zoster virus  This is the same virus that causes chickenpox   Shingles is a painful rash that develops in people who had chickenpox or have been exposed to the virus  How to eat healthy:  My Plate is a model for planning healthy meals  It shows the types and amounts of foods that should go on your plate  Fruits and vegetables make up about half of your plate, and grains and protein make up the other half  A serving of dairy is included on the side of your plate  The amount of calories and serving sizes you need depends on your age, gender, weight, and height  Examples of healthy foods are listed below:  · Eat a variety of vegetables  such as dark green, red, and orange vegetables  You can also include canned vegetables low in sodium (salt) and frozen vegetables without added butter or sauces  · Eat a variety of fresh fruits , canned fruit in 100% juice, frozen fruit, and dried fruit  · Include whole grains  At least half of the grains you eat should be whole grains  Examples include whole-wheat bread, wheat pasta, brown rice, and whole-grain cereals such as oatmeal     · Eat a variety of protein foods such as seafood (fish and shellfish), lean meat, and poultry without skin (turkey and chicken)  Examples of lean meats include pork leg, shoulder, or tenderloin, and beef round, sirloin, tenderloin, and extra lean ground beef  Other protein foods include eggs and egg substitutes, beans, peas, soy products, nuts, and seeds  · Choose low-fat dairy products such as skim or 1% milk or low-fat yogurt, cheese, and cottage cheese  · Limit unhealthy fats  such as butter, hard margarine, and shortening  Exercise:  Exercise at least 30 minutes per day on most days of the week  Some examples of exercise include walking, biking, dancing, and swimming  You can also fit in more physical activity by taking the stairs instead of the elevator or parking farther away from stores  Include muscle strengthening activities 2 days each week  Regular exercise provides many health benefits   It helps you manage your weight, and decreases your risk for type 2 diabetes, heart disease, stroke, and high blood pressure  Exercise can also help improve your mood  Ask your healthcare provider about the best exercise plan for you  General health and safety guidelines:   · Do not smoke  Nicotine and other chemicals in cigarettes and cigars can cause lung damage  Ask your healthcare provider for information if you currently smoke and need help to quit  E-cigarettes or smokeless tobacco still contain nicotine  Talk to your healthcare provider before you use these products  · Limit alcohol  A drink of alcohol is 12 ounces of beer, 5 ounces of wine, or 1½ ounces of liquor  · Lose weight, if needed  Being overweight increases your risk of certain health conditions  These include heart disease, high blood pressure, type 2 diabetes, and certain types of cancer  · Protect your skin  Do not sunbathe or use tanning beds  Use sunscreen with a SPF 15 or higher  Apply sunscreen at least 15 minutes before you go outside  Reapply sunscreen every 2 hours  Wear protective clothing, hats, and sunglasses when you are outside  · Drive safely  Always wear your seatbelt  Make sure everyone in your car wears a seatbelt  A seatbelt can save your life if you are in an accident  Do not use your cell phone when you are driving  This could distract you and cause an accident  Pull over if you need to make a call or send a text message  · Practice safe sex  Use latex condoms if are sexually active and have more than one partner  Your healthcare provider may recommend screening tests for sexually transmitted infections (STIs)  · Wear helmets, lifejackets, and protective gear  Always wear a helmet when you ride a bike or motorcycle, go skiing, or play sports that could cause a head injury  Wear protective equipment when you play sports  Wear a lifejacket when you are on a boat or doing water sports      © Copyright [employer] 2021 Information is for End User's use only and may not be sold, redistributed or otherwise used for commercial purposes  All illustrations and images included in CareNotes® are the copyrighted property of A D A M [employer] Inc  or Michael Salmon  The above information is an  only  It is not intended as medical advice for individual conditions or treatments  Talk to your doctor, nurse or pharmacist before following any medical regimen to see if it is safe and effective for you  Cigarette Smoking and Your Health   AMBULATORY CARE:   Risks to your health if you smoke:  Nicotine and other chemicals found in tobacco and e-cigarettes can damage every cell in your body  Even if you are a light smoker, you have an increased risk for cancer, heart disease, and lung disease  If you are pregnant or have diabetes, smoking increases your risk for complications  Nicotine can affect an adolescent's developing brain  This can lead to trouble thinking, learning, or paying attention  Benefits to your health if you stop smoking:   · You decrease respiratory symptoms such as coughing, wheezing, and shortness of breath  · You reduce your risk for cancers of the lung, mouth, throat, kidney, bladder, pancreas, stomach, and cervix  If you already have cancer, you increase the benefits of chemotherapy  You also reduce your risk for cancer returning or a second cancer from developing  · You reduce your risk for heart disease, blood clots, heart attack, and stroke  · You reduce your risk for lung infections, and diseases such as pneumonia, asthma, chronic bronchitis, and emphysema  · Your circulation improves  More oxygen can be delivered to your body  If you have diabetes, you lower your risk for complications, such as kidney, artery, and eye diseases  You also lower your risk for nerve damage  Nerve damage can lead to amputations, poor vision, and blindness      · You improve your body's ability to heal and to fight infections  · An adolescent can help his or her brain and body develop in a healthy way  Talk to your adolescent about all the health risks of nicotine  If you can, start talking about nicotine when your child is younger than 12 years  This may make it easier for him or her not to start using nicotine as a teenager or adult  Explain to him or her that it is best never to start  It can be hard to try to quit later  Benefits to the health of others if you stop smoking:  Tobacco is harmful to nonsmokers who breathe in your secondhand smoke  The following are ways the health of others around you may improve when you stop smoking:  · You lower the risks for lung cancer and heart disease in nonsmoking adults  · If you are pregnant, you lower the risk for miscarriage, early delivery, low birth weight, and stillbirth  You also lower your baby's risk for SIDS, obesity, developmental delay, and neurobehavioral problems, such as ADHD  · If you have children, you lower their risk for ear infections, colds, pneumonia, bronchitis, and asthma  Follow up with your doctor as directed:  Write down your questions so you remember to ask them during your visits  For support and more information:   · American [employer] Association  [address] [place] [address],[address]  [address]  Phone: [facility] [place] [facility]  Phone: 4- 968 - [phone]  Web Address: [employer]    · Smokefree  gov  Phone: 8- 002 - 278-9575  Web Address: www smokefree  [facility] [address] 2021 Information is for End User's use only and may not be sold, redistributed or otherwise used for commercial purposes  All illustrations and images included in CareNotes® are the copyrighted property of A D A [employer]  or [address] [place] [address]  The above information is an  only  It is not intended as medical advice for individual conditions or treatments   Talk to your doctor, nurse or pharmacist before following any medical regimen to see if it is safe and effective for you  Cholesterol and Your Health   AMBULATORY CARE:   Cholesterol  is a waxy, fat-like substance  Your body uses cholesterol to make hormones and new cells, and to protect nerves  Cholesterol is made by your body  It also comes from certain foods you eat, such as meat and dairy products  Your healthcare provider can help you set goals for your cholesterol levels  He or she can help you create a plan to meet your goals  Cholesterol level goals: Your cholesterol level goals depend on your risk for heart disease, your age, and your other health conditions  The following are general guidelines:  · Total cholesterol  includes low-density lipoprotein (LDL), high-density lipoprotein (HDL), and triglyceride levels  The total cholesterol level should be lower than 200 mg/dL and is best at about 150 mg/dL  · LDL cholesterol  is called bad cholesterol  because it forms plaque in your arteries  As plaque builds up, your arteries become narrow, and less blood flows through  When plaque decreases blood flow to your heart, you may have chest pain  If plaque completely blocks an artery that brings blood to your heart, you may have a heart attack  Plaque can break off and form blood clots  Blood clots may block arteries in your brain and cause a stroke  The level should be less than 130 mg/dL and is best at about 100 mg/dL  · HDL cholesterol  is called good cholesterol  because it helps remove LDL cholesterol from your arteries  It does this by attaching to LDL cholesterol and carrying it to your liver  Your liver breaks down LDL cholesterol so your body can get rid of it  High levels of HDL cholesterol can help prevent a heart attack and stroke  Low levels of HDL cholesterol can increase your risk for heart disease, heart attack, and stroke  The level should be 60 mg/dL or higher  · Triglycerides  are a type of fat that store energy from foods you eat   High levels of triglycerides also cause plaque buildup  This can increase your risk for a heart attack or stroke  If your triglyceride level is high, your LDL cholesterol level may also be high  The level should be less than 150 mg/dL  Any of the following can increase your risk for high cholesterol:   · Smoking cigarettes    · Being overweight or obese, or not getting enough exercise    · Drinking large amounts of alcohol    · A medical condition such as hypertension (high blood pressure) or diabetes    · Certain genes passed from your parents to you    · Age older than 65 years    What you need to know about having your cholesterol levels checked: Adults 21to 39years of age should have their cholesterol levels checked every 4 to 6 years  Adults 45 years or older should have their cholesterol checked every 1 to 2 years  You may need your cholesterol checked more often, or at a younger age, if you have risk factors for heart disease  You may also need to have your cholesterol checked more often if you have other health conditions, such as diabetes  Blood tests are used to check cholesterol levels  Blood tests measure your levels of triglycerides, LDL cholesterol, and HDL cholesterol  How healthy fats affect your cholesterol levels:  Healthy fats, also called unsaturated fats, help lower LDL cholesterol and triglyceride levels  Healthy fats include the following:  · Monounsaturated fats  are found in foods such as olive oil, canola oil, avocado, nuts, and olives  · Polyunsaturated fats,  such as omega 3 fats, are found in fish, such as salmon, trout, and tuna  They can also be found in plant foods such as flaxseed, walnuts, and soybeans  How unhealthy fats affect your cholesterol levels:  Unhealthy fats increase LDL cholesterol and triglyceride levels  They are found in foods high in cholesterol, saturated fat, and trans fat:  · Cholesterol  is found in eggs, dairy, and meat      · Saturated fat  is found in butter, cheese, ice cream, whole milk, and coconut oil  Saturated fat is also found in meat, such as sausage, hot dogs, and bologna  · Trans fat  is found in liquid oils and is used in fried and baked foods  Foods that contain trans fats include chips, crackers, muffins, sweet rolls, microwave popcorn, and cookies  Treatment  for high cholesterol will also decrease your risk of heart disease, heart attack, and stroke  Treatment may include any of the following:  · Lifestyle changes  may include food, exercise, weight loss, and quitting smoking  You may also need to decrease the amount of alcohol you drink  Your healthcare provider will want you to start with lifestyle changes  Other treatment may be added if lifestyle changes are not enough  Your healthcare provider may recommend you work with a team to manage hyperlipidemia  The team may include medical experts such as a dietitian, an exercise or physical therapist, and a behavior therapist  Your family members may be included in helping you create lifestyle changes  · Medicines  may be given to lower your LDL cholesterol, triglyceride levels, or total cholesterol level  You may need medicines to lower your cholesterol if any of the following is true:    ? You have a history of stroke, TIA, unstable angina, or a heart attack  ? Your LDL cholesterol level is 190 mg/dL or higher  ? You are age 36 to 76 years, have diabetes or heart disease risk factors, and your LDL cholesterol is 70 mg/dL or higher  · Supplements  include fish oil, red yeast rice, and garlic  Fish oil may help lower your triglyceride and LDL cholesterol levels  It may also increase your HDL cholesterol level  Red yeast rice may help decrease your total cholesterol level and LDL cholesterol level  Garlic may help lower your total cholesterol level  Do not take any supplements without talking to your healthcare provider      Food changes you can make to lower your cholesterol levels:  A dietitian can help you create a healthy eating plan  He or she can show you how to read food labels and choose foods low in saturated fat, trans fats, and cholesterol  · Decrease the total amount of fat you eat  Choose lean meats, fat-free or 1% fat milk, and low-fat dairy products, such as yogurt and cheese  Try to limit or avoid red meats  Limit or do not eat fried foods or baked goods, such as cookies  · Replace unhealthy fats with healthy fats  Cook foods in olive oil or canola oil  Choose soft margarines that are low in saturated fat and trans fat  Seeds, nuts, and avocados are other examples of healthy fats  · Eat foods with omega-3 fats  Examples include salmon, tuna, mackerel, walnuts, and flaxseed  Eat fish 2 times per week  Pregnant women should not eat fish that have high levels of mercury, such as shark, swordfish, and onelia mackerel  · Increase the amount of high-fiber foods you eat  High-fiber foods can help lower your LDL cholesterol  Aim to get between 20 and 30 grams of fiber each day  Fruits and vegetables are high in fiber  Eat at least 5 servings each day  Other high-fiber foods are whole-grain or whole-wheat breads, pastas, or cereals, and brown rice  Eat 3 ounces of whole-grain foods each day  Increase fiber slowly  You may have abdominal discomfort, bloating, and gas if you add fiber to your diet too quickly  · Eat healthy protein foods  Examples include low-fat dairy products, skinless chicken and turkey, fish, and nuts  · Limit foods and drinks that are high in sugar  Your dietitian or healthcare provider can help you create daily limits for high-sugar foods and drinks  The limit may be lower if you have diabetes or another health condition  Limits can also help you lose weight if needed  Lifestyle changes you can make to lower your cholesterol levels:   · Maintain a healthy weight  Ask your healthcare provider what a healthy weight is for you   Ask him or her to help you create a weight loss plan if needed  Weight loss can decrease your total cholesterol and triglyceride levels  Weight loss may also help keep your blood pressure at a healthy level  · Be physically active throughout the day  Physical activity, such as exercise, can help lower your total cholesterol level and maintain a healthy weight  Physical activity can also help increase your HDL cholesterol level  Work with your healthcare provider to create an program that is right for you  Get at least 30 to 40 minutes of moderate physical activity most days of the week  Examples of exercise include brisk walking, swimming, or biking  Also include strength training at least 2 times each week  Your healthcare providers can help you create a physical activity plan  · Do not smoke  Nicotine and other chemicals in cigarettes and cigars can raise your cholesterol levels  Ask your healthcare provider for information if you currently smoke and need help to quit  E-cigarettes or smokeless tobacco still contain nicotine  Talk to your healthcare provider before you use these products  · Limit or do not drink alcohol  Alcohol can increase your triglyceride levels  Ask your healthcare provider before you drink alcohol  Ask how much is okay for you to drink in 24 hours or 1 week  Follow up with your doctor as directed:  Write down your questions so you remember to ask them during your visits  © Copyright [employer] 2021 Information is for End User's use only and may not be sold, redistributed or otherwise used for commercial purposes  All illustrations and images included in CareNotes® are the copyrighted property of A [employer]  or Michael Adams   The above information is an  only  It is not intended as medical advice for individual conditions or treatments  Talk to your doctor, nurse or pharmacist before following any medical regimen to see if it is safe and effective for you

## 2021-10-04 ENCOUNTER — TELEPHONE (OUTPATIENT)
Dept: SURGERY | Facility: CLINIC | Age: 51
End: 2021-10-04

## 2021-10-07 ENCOUNTER — CONSULT (OUTPATIENT)
Dept: SURGERY | Facility: CLINIC | Age: 51
End: 2021-10-07
Payer: COMMERCIAL

## 2021-10-07 VITALS
WEIGHT: 199 LBS | BODY MASS INDEX: 28.49 KG/M2 | RESPIRATION RATE: 16 BRPM | HEART RATE: 75 BPM | DIASTOLIC BLOOD PRESSURE: 88 MMHG | HEIGHT: 70 IN | TEMPERATURE: 97.9 F | SYSTOLIC BLOOD PRESSURE: 118 MMHG

## 2021-10-07 DIAGNOSIS — K40.20 BILATERAL INGUINAL HERNIA: Primary | ICD-10-CM

## 2021-10-07 DIAGNOSIS — K40.90 LEFT INGUINAL HERNIA: ICD-10-CM

## 2021-10-07 PROCEDURE — 99203 OFFICE O/P NEW LOW 30 MIN: CPT | Performed by: SURGERY

## 2021-10-07 PROCEDURE — 3008F BODY MASS INDEX DOCD: CPT | Performed by: PHYSICIAN ASSISTANT

## 2021-10-07 RX ORDER — SODIUM CHLORIDE, SODIUM LACTATE, POTASSIUM CHLORIDE, CALCIUM CHLORIDE 600; 310; 30; 20 MG/100ML; MG/100ML; MG/100ML; MG/100ML
125 INJECTION, SOLUTION INTRAVENOUS
Status: CANCELLED | OUTPATIENT
Start: 2021-10-08 | End: 2021-10-09

## 2021-10-07 RX ORDER — HEPARIN SODIUM 5000 [USP'U]/ML
5000 INJECTION, SOLUTION INTRAVENOUS; SUBCUTANEOUS ONCE
Status: CANCELLED | OUTPATIENT
Start: 2021-10-07 | End: 2021-10-07

## 2021-10-07 RX ORDER — TAMSULOSIN HYDROCHLORIDE 0.4 MG/1
0.4 CAPSULE ORAL
Qty: 5 CAPSULE | Refills: 0 | Status: SHIPPED | OUTPATIENT
Start: 2021-10-07 | End: 2021-11-15

## 2021-10-27 DIAGNOSIS — J20.9 ACUTE BRONCHITIS, UNSPECIFIED ORGANISM: ICD-10-CM

## 2021-10-27 RX ORDER — DEXAMETHASONE 4 MG/1
TABLET ORAL
Qty: 12 G | Refills: 0 | Status: SHIPPED | OUTPATIENT
Start: 2021-10-27 | End: 2021-12-03

## 2021-11-11 ENCOUNTER — OFFICE VISIT (OUTPATIENT)
Dept: LAB | Facility: HOSPITAL | Age: 51
End: 2021-11-11
Payer: COMMERCIAL

## 2021-11-11 ENCOUNTER — APPOINTMENT (OUTPATIENT)
Dept: LAB | Facility: HOSPITAL | Age: 51
End: 2021-11-11
Payer: COMMERCIAL

## 2021-11-11 DIAGNOSIS — Z13.1 SCREENING FOR DIABETES MELLITUS: ICD-10-CM

## 2021-11-11 DIAGNOSIS — E78.2 MIXED HYPERLIPIDEMIA: ICD-10-CM

## 2021-11-11 DIAGNOSIS — K40.20 BILATERAL INGUINAL HERNIA: ICD-10-CM

## 2021-11-11 DIAGNOSIS — L40.9 PSORIASIS: ICD-10-CM

## 2021-11-11 DIAGNOSIS — Z12.5 SCREENING FOR PROSTATE CANCER: ICD-10-CM

## 2021-11-11 LAB
ALBUMIN SERPL BCP-MCNC: 3.9 G/DL (ref 3.5–5)
ALP SERPL-CCNC: 81 U/L (ref 46–116)
ALT SERPL W P-5'-P-CCNC: 28 U/L (ref 12–78)
ANION GAP SERPL CALCULATED.3IONS-SCNC: 5 MMOL/L (ref 4–13)
AST SERPL W P-5'-P-CCNC: 17 U/L (ref 5–45)
ATRIAL RATE: 69 BPM
BILIRUB SERPL-MCNC: 0.6 MG/DL (ref 0.2–1)
BUN SERPL-MCNC: 16 MG/DL (ref 5–25)
CALCIUM SERPL-MCNC: 8.8 MG/DL (ref 8.3–10.1)
CHLORIDE SERPL-SCNC: 103 MMOL/L (ref 100–108)
CHOLEST SERPL-MCNC: 218 MG/DL (ref 50–200)
CO2 SERPL-SCNC: 31 MMOL/L (ref 21–32)
CREAT SERPL-MCNC: 1.09 MG/DL (ref 0.6–1.3)
ERYTHROCYTE [DISTWIDTH] IN BLOOD BY AUTOMATED COUNT: 13.2 % (ref 11.6–15.1)
GFR SERPL CREATININE-BSD FRML MDRD: 77 ML/MIN/1.73SQ M
GLUCOSE P FAST SERPL-MCNC: 80 MG/DL (ref 65–99)
HCT VFR BLD AUTO: 49.6 % (ref 36.5–49.3)
HDLC SERPL-MCNC: 35 MG/DL
HGB BLD-MCNC: 16.3 G/DL (ref 12–17)
LDLC SERPL CALC-MCNC: 144 MG/DL (ref 0–100)
MCH RBC QN AUTO: 31.6 PG (ref 26.8–34.3)
MCHC RBC AUTO-ENTMCNC: 32.9 G/DL (ref 31.4–37.4)
MCV RBC AUTO: 96 FL (ref 82–98)
P AXIS: 73 DEGREES
PLATELET # BLD AUTO: 208 THOUSANDS/UL (ref 149–390)
PMV BLD AUTO: 9 FL (ref 8.9–12.7)
POTASSIUM SERPL-SCNC: 4.3 MMOL/L (ref 3.5–5.3)
PR INTERVAL: 142 MS
PROT SERPL-MCNC: 7.5 G/DL (ref 6.4–8.2)
PSA SERPL-MCNC: 0.7 NG/ML (ref 0–4)
QRS AXIS: 58 DEGREES
QRSD INTERVAL: 94 MS
QT INTERVAL: 406 MS
QTC INTERVAL: 435 MS
RBC # BLD AUTO: 5.16 MILLION/UL (ref 3.88–5.62)
SODIUM SERPL-SCNC: 139 MMOL/L (ref 136–145)
T WAVE AXIS: 40 DEGREES
TRIGL SERPL-MCNC: 196 MG/DL
VENTRICULAR RATE: 69 BPM
WBC # BLD AUTO: 8.68 THOUSAND/UL (ref 4.31–10.16)

## 2021-11-11 PROCEDURE — 36415 COLL VENOUS BLD VENIPUNCTURE: CPT

## 2021-11-11 PROCEDURE — 85027 COMPLETE CBC AUTOMATED: CPT

## 2021-11-11 PROCEDURE — 80061 LIPID PANEL: CPT

## 2021-11-11 PROCEDURE — G0103 PSA SCREENING: HCPCS

## 2021-11-11 PROCEDURE — 93010 ELECTROCARDIOGRAM REPORT: CPT | Performed by: INTERNAL MEDICINE

## 2021-11-11 PROCEDURE — 93005 ELECTROCARDIOGRAM TRACING: CPT

## 2021-11-11 PROCEDURE — 80053 COMPREHEN METABOLIC PANEL: CPT

## 2021-11-15 RX ORDER — MULTIVITAMIN
1 CAPSULE ORAL DAILY
COMMUNITY

## 2021-11-17 ENCOUNTER — HOSPITAL ENCOUNTER (OUTPATIENT)
Facility: HOSPITAL | Age: 51
Setting detail: OUTPATIENT SURGERY
Discharge: HOME | End: 2021-11-17
Attending: SURGERY | Admitting: SURGERY
Payer: COMMERCIAL

## 2021-11-17 ENCOUNTER — ANESTHESIA EVENT (OUTPATIENT)
Dept: PERIOP | Facility: HOSPITAL | Age: 51
End: 2021-11-17
Payer: COMMERCIAL

## 2021-11-17 ENCOUNTER — ANESTHESIA (OUTPATIENT)
Dept: PERIOP | Facility: HOSPITAL | Age: 51
End: 2021-11-17
Payer: COMMERCIAL

## 2021-11-17 VITALS
WEIGHT: 189.6 LBS | HEART RATE: 75 BPM | TEMPERATURE: 98.3 F | SYSTOLIC BLOOD PRESSURE: 151 MMHG | BODY MASS INDEX: 27.14 KG/M2 | HEIGHT: 70 IN | DIASTOLIC BLOOD PRESSURE: 86 MMHG | RESPIRATION RATE: 17 BRPM | OXYGEN SATURATION: 96 %

## 2021-11-17 DIAGNOSIS — K40.20 BILATERAL INGUINAL HERNIA: ICD-10-CM

## 2021-11-17 DIAGNOSIS — K40.20 NON-RECURRENT BILATERAL INGUINAL HERNIA WITHOUT OBSTRUCTION OR GANGRENE: Primary | ICD-10-CM

## 2021-11-17 PROCEDURE — C1781 MESH (IMPLANTABLE): HCPCS | Performed by: SURGERY

## 2021-11-17 PROCEDURE — 49650 LAP ING HERNIA REPAIR INIT: CPT | Performed by: SURGERY

## 2021-11-17 DEVICE — LAPAROSCOPIC SELF-FIXATING MESH POLYESTER WITH POLYLACTIC ACID GRIPS AND COLLAGEN FILM
Type: IMPLANTABLE DEVICE | Site: INGUINAL | Status: FUNCTIONAL
Brand: PROGRIP

## 2021-11-17 RX ORDER — TRAMADOL HYDROCHLORIDE 50 MG/1
50 TABLET ORAL EVERY 6 HOURS PRN
Status: DISCONTINUED | OUTPATIENT
Start: 2021-11-17 | End: 2021-11-17 | Stop reason: HOSPADM

## 2021-11-17 RX ORDER — MIDAZOLAM HYDROCHLORIDE 2 MG/2ML
INJECTION, SOLUTION INTRAMUSCULAR; INTRAVENOUS AS NEEDED
Status: DISCONTINUED | OUTPATIENT
Start: 2021-11-17 | End: 2021-11-17

## 2021-11-17 RX ORDER — ROCURONIUM BROMIDE 10 MG/ML
INJECTION, SOLUTION INTRAVENOUS AS NEEDED
Status: DISCONTINUED | OUTPATIENT
Start: 2021-11-17 | End: 2021-11-17

## 2021-11-17 RX ORDER — ACETAMINOPHEN 325 MG/1
975 TABLET ORAL ONCE
Status: COMPLETED | OUTPATIENT
Start: 2021-11-17 | End: 2021-11-17

## 2021-11-17 RX ORDER — SODIUM CHLORIDE, SODIUM LACTATE, POTASSIUM CHLORIDE, CALCIUM CHLORIDE 600; 310; 30; 20 MG/100ML; MG/100ML; MG/100ML; MG/100ML
INJECTION, SOLUTION INTRAVENOUS CONTINUOUS PRN
Status: DISCONTINUED | OUTPATIENT
Start: 2021-11-17 | End: 2021-11-17

## 2021-11-17 RX ORDER — ACETAMINOPHEN AND CODEINE PHOSPHATE 300; 30 MG/1; MG/1
1 TABLET ORAL EVERY 6 HOURS PRN
Qty: 20 TABLET | Refills: 0 | Status: SHIPPED | OUTPATIENT
Start: 2021-11-17 | End: 2021-11-27

## 2021-11-17 RX ORDER — BUPIVACAINE HYDROCHLORIDE 2.5 MG/ML
INJECTION, SOLUTION EPIDURAL; INFILTRATION; INTRACAUDAL AS NEEDED
Status: DISCONTINUED | OUTPATIENT
Start: 2021-11-17 | End: 2021-11-17 | Stop reason: HOSPADM

## 2021-11-17 RX ORDER — CEFAZOLIN SODIUM 2 G/50ML
2000 SOLUTION INTRAVENOUS ONCE
Status: COMPLETED | OUTPATIENT
Start: 2021-11-17 | End: 2021-11-17

## 2021-11-17 RX ORDER — FENTANYL CITRATE/PF 50 MCG/ML
50 SYRINGE (ML) INJECTION
Status: DISCONTINUED | OUTPATIENT
Start: 2021-11-17 | End: 2021-11-17 | Stop reason: HOSPADM

## 2021-11-17 RX ORDER — HYDROMORPHONE HCL/PF 1 MG/ML
0.5 SYRINGE (ML) INJECTION
Status: DISCONTINUED | OUTPATIENT
Start: 2021-11-17 | End: 2021-11-17 | Stop reason: HOSPADM

## 2021-11-17 RX ORDER — HEPARIN SODIUM 5000 [USP'U]/ML
5000 INJECTION, SOLUTION INTRAVENOUS; SUBCUTANEOUS ONCE
Status: COMPLETED | OUTPATIENT
Start: 2021-11-17 | End: 2021-11-17

## 2021-11-17 RX ORDER — DEXAMETHASONE SODIUM PHOSPHATE 10 MG/ML
INJECTION, SOLUTION INTRAMUSCULAR; INTRAVENOUS AS NEEDED
Status: DISCONTINUED | OUTPATIENT
Start: 2021-11-17 | End: 2021-11-17

## 2021-11-17 RX ORDER — SODIUM CHLORIDE, SODIUM LACTATE, POTASSIUM CHLORIDE, CALCIUM CHLORIDE 600; 310; 30; 20 MG/100ML; MG/100ML; MG/100ML; MG/100ML
125 INJECTION, SOLUTION INTRAVENOUS
Status: COMPLETED | OUTPATIENT
Start: 2021-11-17 | End: 2021-11-17

## 2021-11-17 RX ORDER — FENTANYL CITRATE 50 UG/ML
INJECTION, SOLUTION INTRAMUSCULAR; INTRAVENOUS AS NEEDED
Status: DISCONTINUED | OUTPATIENT
Start: 2021-11-17 | End: 2021-11-17

## 2021-11-17 RX ORDER — PROPOFOL 10 MG/ML
INJECTION, EMULSION INTRAVENOUS AS NEEDED
Status: DISCONTINUED | OUTPATIENT
Start: 2021-11-17 | End: 2021-11-17

## 2021-11-17 RX ORDER — LIDOCAINE HYDROCHLORIDE 10 MG/ML
INJECTION, SOLUTION EPIDURAL; INFILTRATION; INTRACAUDAL; PERINEURAL AS NEEDED
Status: DISCONTINUED | OUTPATIENT
Start: 2021-11-17 | End: 2021-11-17

## 2021-11-17 RX ORDER — ONDANSETRON 2 MG/ML
4 INJECTION INTRAMUSCULAR; INTRAVENOUS EVERY 8 HOURS PRN
Status: DISCONTINUED | OUTPATIENT
Start: 2021-11-17 | End: 2021-11-17 | Stop reason: HOSPADM

## 2021-11-17 RX ORDER — ONDANSETRON 2 MG/ML
INJECTION INTRAMUSCULAR; INTRAVENOUS AS NEEDED
Status: DISCONTINUED | OUTPATIENT
Start: 2021-11-17 | End: 2021-11-17

## 2021-11-17 RX ORDER — MAGNESIUM HYDROXIDE 1200 MG/15ML
LIQUID ORAL AS NEEDED
Status: DISCONTINUED | OUTPATIENT
Start: 2021-11-17 | End: 2021-11-17 | Stop reason: HOSPADM

## 2021-11-17 RX ADMIN — FENTANYL CITRATE 50 MCG: 50 INJECTION, SOLUTION INTRAMUSCULAR; INTRAVENOUS at 11:07

## 2021-11-17 RX ADMIN — ROCURONIUM BROMIDE 50 MG: 10 INJECTION, SOLUTION INTRAVENOUS at 10:09

## 2021-11-17 RX ADMIN — FENTANYL CITRATE 50 MCG: 50 INJECTION, SOLUTION INTRAMUSCULAR; INTRAVENOUS at 10:26

## 2021-11-17 RX ADMIN — HYDROMORPHONE HYDROCHLORIDE 0.5 MG: 1 INJECTION, SOLUTION INTRAMUSCULAR; INTRAVENOUS; SUBCUTANEOUS at 13:02

## 2021-11-17 RX ADMIN — ACETAMINOPHEN 975 MG: 325 TABLET, FILM COATED ORAL at 09:53

## 2021-11-17 RX ADMIN — HEPARIN SODIUM 5000 UNITS: 5000 INJECTION INTRAVENOUS; SUBCUTANEOUS at 09:53

## 2021-11-17 RX ADMIN — SODIUM CHLORIDE, SODIUM LACTATE, POTASSIUM CHLORIDE, AND CALCIUM CHLORIDE 125 ML/HR: .6; .31; .03; .02 INJECTION, SOLUTION INTRAVENOUS at 09:53

## 2021-11-17 RX ADMIN — ONDANSETRON 4 MG: 2 INJECTION INTRAMUSCULAR; INTRAVENOUS at 10:45

## 2021-11-17 RX ADMIN — LIDOCAINE HYDROCHLORIDE 100 MG: 10 INJECTION, SOLUTION EPIDURAL; INFILTRATION; INTRACAUDAL; PERINEURAL at 10:08

## 2021-11-17 RX ADMIN — ROCURONIUM BROMIDE 30 MG: 10 INJECTION, SOLUTION INTRAVENOUS at 11:06

## 2021-11-17 RX ADMIN — FENTANYL CITRATE 50 MCG: 50 INJECTION, SOLUTION INTRAMUSCULAR; INTRAVENOUS at 12:55

## 2021-11-17 RX ADMIN — DEXAMETHASONE SODIUM PHOSPHATE 4 MG: 10 INJECTION, SOLUTION INTRAMUSCULAR; INTRAVENOUS at 10:45

## 2021-11-17 RX ADMIN — FENTANYL CITRATE 50 MCG: 50 INJECTION, SOLUTION INTRAMUSCULAR; INTRAVENOUS at 12:45

## 2021-11-17 RX ADMIN — FENTANYL CITRATE 100 MCG: 50 INJECTION, SOLUTION INTRAMUSCULAR; INTRAVENOUS at 10:08

## 2021-11-17 RX ADMIN — SODIUM CHLORIDE, SODIUM LACTATE, POTASSIUM CHLORIDE, AND CALCIUM CHLORIDE: .6; .31; .03; .02 INJECTION, SOLUTION INTRAVENOUS at 09:52

## 2021-11-17 RX ADMIN — FENTANYL CITRATE 50 MCG: 50 INJECTION, SOLUTION INTRAMUSCULAR; INTRAVENOUS at 12:01

## 2021-11-17 RX ADMIN — MIDAZOLAM HYDROCHLORIDE 2 MG: 1 INJECTION, SOLUTION INTRAMUSCULAR; INTRAVENOUS at 09:57

## 2021-11-17 RX ADMIN — TRAMADOL HYDROCHLORIDE 50 MG: 50 TABLET ORAL at 13:51

## 2021-11-17 RX ADMIN — FENTANYL CITRATE 50 MCG: 50 INJECTION, SOLUTION INTRAMUSCULAR; INTRAVENOUS at 11:59

## 2021-11-17 RX ADMIN — CEFAZOLIN SODIUM 2000 MG: 2 SOLUTION INTRAVENOUS at 09:59

## 2021-11-17 RX ADMIN — PROPOFOL 200 MG: 10 INJECTION, EMULSION INTRAVENOUS at 10:08

## 2021-12-01 ENCOUNTER — OFFICE VISIT (OUTPATIENT)
Dept: SURGERY | Facility: CLINIC | Age: 51
End: 2021-12-01

## 2021-12-01 VITALS
WEIGHT: 189 LBS | DIASTOLIC BLOOD PRESSURE: 84 MMHG | HEIGHT: 70 IN | HEART RATE: 73 BPM | RESPIRATION RATE: 16 BRPM | BODY MASS INDEX: 27.06 KG/M2 | SYSTOLIC BLOOD PRESSURE: 124 MMHG | TEMPERATURE: 98 F

## 2021-12-01 DIAGNOSIS — Z48.89 POSTOPERATIVE VISIT: ICD-10-CM

## 2021-12-01 DIAGNOSIS — K40.20 NON-RECURRENT BILATERAL INGUINAL HERNIA WITHOUT OBSTRUCTION OR GANGRENE: Primary | ICD-10-CM

## 2021-12-01 PROCEDURE — 99024 POSTOP FOLLOW-UP VISIT: CPT | Performed by: SURGERY

## 2021-12-03 DIAGNOSIS — J20.9 ACUTE BRONCHITIS, UNSPECIFIED ORGANISM: ICD-10-CM

## 2021-12-03 RX ORDER — DEXAMETHASONE 4 MG/1
TABLET ORAL
Qty: 12 G | Refills: 0 | Status: SHIPPED | OUTPATIENT
Start: 2021-12-03 | End: 2022-01-04

## 2022-01-04 DIAGNOSIS — J20.9 ACUTE BRONCHITIS, UNSPECIFIED ORGANISM: ICD-10-CM

## 2022-01-04 RX ORDER — DEXAMETHASONE 4 MG/1
TABLET ORAL
Qty: 12 G | Refills: 0 | Status: SHIPPED | OUTPATIENT
Start: 2022-01-04 | End: 2022-02-14

## 2022-02-14 DIAGNOSIS — J20.9 ACUTE BRONCHITIS, UNSPECIFIED ORGANISM: ICD-10-CM

## 2022-02-14 RX ORDER — DEXAMETHASONE 4 MG/1
TABLET ORAL
Qty: 12 G | Refills: 0 | Status: SHIPPED | OUTPATIENT
Start: 2022-02-14 | End: 2022-03-22

## 2022-03-22 DIAGNOSIS — J20.9 ACUTE BRONCHITIS, UNSPECIFIED ORGANISM: ICD-10-CM

## 2022-03-22 RX ORDER — DEXAMETHASONE 4 MG/1
TABLET ORAL
Qty: 12 G | Refills: 0 | Status: SHIPPED | OUTPATIENT
Start: 2022-03-22 | End: 2022-04-19

## 2022-04-19 DIAGNOSIS — K21.9 GASTROESOPHAGEAL REFLUX DISEASE WITHOUT ESOPHAGITIS: ICD-10-CM

## 2022-04-19 DIAGNOSIS — J20.9 ACUTE BRONCHITIS, UNSPECIFIED ORGANISM: ICD-10-CM

## 2022-04-19 RX ORDER — DEXAMETHASONE 4 MG/1
TABLET ORAL
Qty: 12 G | Refills: 0 | Status: SHIPPED | OUTPATIENT
Start: 2022-04-19 | End: 2022-05-21

## 2022-04-19 RX ORDER — OMEPRAZOLE 20 MG/1
CAPSULE, DELAYED RELEASE ORAL
Qty: 30 CAPSULE | Refills: 5 | Status: SHIPPED | OUTPATIENT
Start: 2022-04-19

## 2022-05-21 DIAGNOSIS — J20.9 ACUTE BRONCHITIS, UNSPECIFIED ORGANISM: ICD-10-CM

## 2022-05-21 RX ORDER — DEXAMETHASONE 4 MG/1
TABLET ORAL
Qty: 12 G | Refills: 0 | Status: SHIPPED | OUTPATIENT
Start: 2022-05-21 | End: 2022-06-19

## 2022-06-19 DIAGNOSIS — J20.9 ACUTE BRONCHITIS, UNSPECIFIED ORGANISM: ICD-10-CM

## 2022-06-19 RX ORDER — DEXAMETHASONE 4 MG/1
TABLET ORAL
Qty: 12 G | Refills: 0 | Status: SHIPPED | OUTPATIENT
Start: 2022-06-19 | End: 2022-07-23

## 2022-07-20 ENCOUNTER — OFFICE VISIT (OUTPATIENT)
Dept: FAMILY MEDICINE CLINIC | Facility: CLINIC | Age: 52
End: 2022-07-20
Payer: COMMERCIAL

## 2022-07-20 VITALS
SYSTOLIC BLOOD PRESSURE: 130 MMHG | BODY MASS INDEX: 27.06 KG/M2 | HEIGHT: 70 IN | WEIGHT: 189 LBS | TEMPERATURE: 98.3 F | HEART RATE: 71 BPM | OXYGEN SATURATION: 95 % | DIASTOLIC BLOOD PRESSURE: 70 MMHG

## 2022-07-20 DIAGNOSIS — L73.9 FOLLICULITIS: Primary | ICD-10-CM

## 2022-07-20 PROCEDURE — 99214 OFFICE O/P EST MOD 30 MIN: CPT | Performed by: STUDENT IN AN ORGANIZED HEALTH CARE EDUCATION/TRAINING PROGRAM

## 2022-07-20 PROCEDURE — 3725F SCREEN DEPRESSION PERFORMED: CPT | Performed by: STUDENT IN AN ORGANIZED HEALTH CARE EDUCATION/TRAINING PROGRAM

## 2022-07-20 RX ORDER — DOXYCYCLINE HYCLATE 100 MG/1
100 CAPSULE ORAL EVERY 12 HOURS SCHEDULED
Qty: 14 CAPSULE | Refills: 0 | Status: SHIPPED | OUTPATIENT
Start: 2022-07-20 | End: 2022-07-27

## 2022-07-20 NOTE — PROGRESS NOTES
Assessment/Plan:         Problem List Items Addressed This Visit    None     Visit Diagnoses     Folliculitis    -  Primary    Relevant Medications    doxycycline hyclate (VIBRAMYCIN) 100 mg capsule        Consistent with folliculitis after shaving  If no improvement will call and have him see Dermatology  Discussed using sunscreen while on the doxycycline    Subjective:      Patient ID: Russ Hauser is a 45 y o  male  HPI    Patient coming in to be seen as he has a lesion on the right jawline  Reports he had a pimple there and has been present the last 2 weeks and does not resolve  He has been using Kenalog cream on it  He is frequently out in the sun and an avid fisherman    The following portions of the patient's history were reviewed and updated as appropriate:   Past Medical History:  He has a past medical history of Asthma, Erectile dysfunction, Hyperlipidemia, Kidney stone, and Psoriasis  ,  _______________________________________________________________________  Medical Problems:  does not have any pertinent problems on file ,  _______________________________________________________________________  Past Surgical History:   has a past surgical history that includes Hand surgery (Right); Tonsillectomy; [place] tooth extraction; Colonoscopy; and Hernia repair (Bilateral, 11/17/2021)  ,  _______________________________________________________________________  Family History:  family history includes Diabetes in his mother; Prostate cancer in his father ,  _______________________________________________________________________  Social History:   reports that he has been smoking  He has a 52 50 pack-year smoking history  He has quit using smokeless tobacco  He reports previous alcohol use   He reports previous drug use ,  _______________________________________________________________________  Allergies:  has No Known Allergies     _______________________________________________________________________  Current Outpatient Medications   Medication Sig Dispense Refill    doxycycline hyclate (VIBRAMYCIN) 100 mg capsule Take 1 capsule (100 mg total) by mouth every 12 (twelve) hours for 7 days 14 capsule 0    Flovent  MCG/ACT inhaler inhale 2 puffs by mouth and INTO THE LUNGS twice a day Rinse mouth after use 12 g 0    Multiple Vitamin (multivitamin) capsule Take 1 capsule by mouth daily      Naproxen Sodium (ALEVE PO) Take 200 mg by mouth      omeprazole (PriLOSEC) 20 mg delayed release capsule take 1 capsule by mouth once daily 30 capsule 5    triamcinolone (KENALOG) 0 5 % cream Apply topically 3 (three) times a day 30 g 3    tamsulosin (FLOMAX) 0 4 mg Take 1 capsule (0 4 mg total) by mouth daily with dinner for 5 days Start taking medication 3 days prior to surgery 5 capsule 0     No current facility-administered medications for this visit      _______________________________________________________________________  Review of Systems   Skin: Positive for wound  Negative for color change, pallor and rash  Objective:  Vitals:    07/20/22 1338   BP: 130/70   Pulse: 71   Temp: 98 3 °F (36 8 °C)   SpO2: 95%   Weight: 85 7 kg (189 lb)   Height: 5' 10" (1 778 m)     Body mass index is 27 12 kg/m²  Physical Exam  HENT:      Head: Normocephalic and atraumatic  Mouth/Throat:      Lips: Pink  Mouth: Mucous membranes are moist       Dentition: Has dentures  Pharynx: Oropharynx is clear  Skin:         Neurological:      Mental Status: He is alert

## 2022-07-23 DIAGNOSIS — J20.9 ACUTE BRONCHITIS, UNSPECIFIED ORGANISM: ICD-10-CM

## 2022-07-23 RX ORDER — DEXAMETHASONE 4 MG/1
TABLET ORAL
Qty: 12 G | Refills: 0 | Status: SHIPPED | OUTPATIENT
Start: 2022-07-23 | End: 2022-08-25

## 2022-08-25 DIAGNOSIS — J20.9 ACUTE BRONCHITIS, UNSPECIFIED ORGANISM: ICD-10-CM

## 2022-08-25 RX ORDER — DEXAMETHASONE 4 MG/1
TABLET ORAL
Qty: 12 G | Refills: 0 | Status: SHIPPED | OUTPATIENT
Start: 2022-08-25 | End: 2022-09-29

## 2022-09-08 DIAGNOSIS — L40.9 PSORIASIS: ICD-10-CM

## 2022-09-08 RX ORDER — TRIAMCINOLONE ACETONIDE 5 MG/G
CREAM TOPICAL
Qty: 30 G | Refills: 3 | Status: SHIPPED | OUTPATIENT
Start: 2022-09-08

## 2022-09-29 DIAGNOSIS — J20.9 ACUTE BRONCHITIS, UNSPECIFIED ORGANISM: ICD-10-CM

## 2022-09-29 RX ORDER — DEXAMETHASONE 4 MG/1
TABLET ORAL
Qty: 12 G | Refills: 0 | Status: SHIPPED | OUTPATIENT
Start: 2022-09-29 | End: 2022-10-27

## 2022-10-03 ENCOUNTER — TELEPHONE (OUTPATIENT)
Dept: FAMILY MEDICINE CLINIC | Facility: CLINIC | Age: 52
End: 2022-10-03

## 2022-10-03 DIAGNOSIS — L73.9 FOLLICULITIS: Primary | ICD-10-CM

## 2022-10-03 RX ORDER — DOXYCYCLINE HYCLATE 100 MG/1
100 CAPSULE ORAL EVERY 12 HOURS SCHEDULED
Qty: 20 CAPSULE | Refills: 0 | Status: SHIPPED | OUTPATIENT
Start: 2022-10-03 | End: 2022-10-13

## 2022-10-03 NOTE — TELEPHONE ENCOUNTER
T/c from Damien Jennings, [phone] -  pt was seen by Dr Hilario Isaac in July for a rash and it has returned since he finished the ABX cream that was prescribed - requesting another course of the cream - if an appt is needed, pt is going ice fishing and won't be available until after 10/13 to schedule  For scheduling we have to call her at number above in this t/c  Please advise

## 2022-10-03 NOTE — TELEPHONE ENCOUNTER
Dr Harry Johnson prescribed an antibiotic  I had prescribed a cream for psoriasis in the past  Which does he need?

## 2022-10-03 NOTE — TELEPHONE ENCOUNTER
Spoke with pts wife -- reports they are hoping to get the medication that Dr Hilario Hicks sent in in July, it is the same issue he was having then

## 2022-10-04 ENCOUNTER — TELEPHONE (OUTPATIENT)
Dept: FAMILY MEDICINE CLINIC | Facility: CLINIC | Age: 52
End: 2022-10-04

## 2022-10-04 NOTE — TELEPHONE ENCOUNTER
T/c from pts wife, requesting abx for pt -- has had runny nose and cough that is keeping him up at night for 2 to 3 days  Took an at home covid test and was negative  Has been taking muscinex, but it is not helping  Advised pts wife Jenaro Gracia may need to see pt virtually, but wife requested message be sent to see if this is the case       Please call Laverne Yasir (wife) @ [phone]

## 2022-10-27 DIAGNOSIS — K21.9 GASTROESOPHAGEAL REFLUX DISEASE WITHOUT ESOPHAGITIS: ICD-10-CM

## 2022-10-27 DIAGNOSIS — J20.9 ACUTE BRONCHITIS, UNSPECIFIED ORGANISM: ICD-10-CM

## 2022-10-27 RX ORDER — OMEPRAZOLE 20 MG/1
CAPSULE, DELAYED RELEASE ORAL
Qty: 30 CAPSULE | Refills: 5 | Status: SHIPPED | OUTPATIENT
Start: 2022-10-27

## 2022-10-27 RX ORDER — DEXAMETHASONE 4 MG/1
TABLET ORAL
Qty: 12 G | Refills: 0 | Status: SHIPPED | OUTPATIENT
Start: 2022-10-27

## 2022-12-06 DIAGNOSIS — J20.9 ACUTE BRONCHITIS, UNSPECIFIED ORGANISM: ICD-10-CM

## 2022-12-06 RX ORDER — DEXAMETHASONE 4 MG/1
TABLET ORAL
Qty: 12 G | Refills: 0 | Status: SHIPPED | OUTPATIENT
Start: 2022-12-06

## 2023-01-03 ENCOUNTER — OFFICE VISIT (OUTPATIENT)
Dept: FAMILY MEDICINE CLINIC | Facility: CLINIC | Age: 53
End: 2023-01-03

## 2023-01-03 VITALS
HEART RATE: 78 BPM | TEMPERATURE: 97.8 F | DIASTOLIC BLOOD PRESSURE: 84 MMHG | OXYGEN SATURATION: 99 % | BODY MASS INDEX: 26.05 KG/M2 | SYSTOLIC BLOOD PRESSURE: 140 MMHG | WEIGHT: 182 LBS | HEIGHT: 70 IN

## 2023-01-03 DIAGNOSIS — L98.9 FACIAL SKIN LESION: Primary | ICD-10-CM

## 2023-01-03 DIAGNOSIS — Z72.0 TOBACCO USE: ICD-10-CM

## 2023-01-03 DIAGNOSIS — R09.81 SINUS CONGESTION: ICD-10-CM

## 2023-01-03 RX ORDER — FLUTICASONE PROPIONATE 50 MCG
1 SPRAY, SUSPENSION (ML) NASAL DAILY
Qty: 16 G | Refills: 3 | Status: SHIPPED | OUTPATIENT
Start: 2023-01-03

## 2023-01-03 RX ORDER — VARENICLINE TARTRATE 25 MG
KIT ORAL
Qty: 53 EACH | Refills: 0 | Status: SHIPPED | OUTPATIENT
Start: 2023-01-03 | End: 2023-01-31

## 2023-01-03 NOTE — PROGRESS NOTES
Assessment/Plan:          Diagnoses and all orders for this visit:    Facial skin lesion  -     Ambulatory Referral to Plastic Surgery; Future    Sinus congestion  -     fluticasone (FLONASE) 50 mcg/act nasal spray; 1 spray into each nostril daily    Tobacco use  -     varenicline 0 5 MG X 11 & 1 MG X 42 tablet therapy pack; Take 0 5 mg by mouth daily for 3 days, THEN 0 5 mg 2 (two) times a day for 4 days, THEN 1 mg 2 (two) times a day for 21 days  Subjective:      Patient ID: Shereen Denney is a 45 y o  male  Pt has a lesion on the right side of his face that is not resolving  He has had treatment for folliculitis twice without improvement  He has also tried cream that he used for psoriasis without improvement  Pt is also complaining of constant sinus drainage  The following portions of the patient's history were reviewed and updated as appropriate:   He has a past medical history of Asthma, Erectile dysfunction, Hyperlipidemia, Kidney stone, and Psoriasis  ,  does not have any pertinent problems on file  ,   has a past surgical history that includes Hand surgery (Right); Tonsillectomy; [place] tooth extraction; Colonoscopy; and Hernia repair (Bilateral, 11/17/2021)  ,  family history includes Diabetes in his mother; Prostate cancer in his father  ,   reports that he has been smoking  He has a 52 50 pack-year smoking history  He has quit using smokeless tobacco  He reports that he does not currently use alcohol  He reports that he does not currently use drugs  ,  has No Known Allergies     Current Outpatient Medications   Medication Sig Dispense Refill   • Flovent  MCG/ACT inhaler inhale 2 puffs by mouth and INTO THE LUNGS twice a day Rinse mouth after use 12 g 0   • fluticasone (FLONASE) 50 mcg/act nasal spray 1 spray into each nostril daily 16 g 3   • Multiple Vitamin (multivitamin) capsule Take 1 capsule by mouth daily     • Naproxen Sodium (ALEVE PO) Take 200 mg by mouth     • omeprazole (PriLOSEC) 20 mg delayed release capsule take 1 capsule by mouth once daily 30 capsule 5   • triamcinolone (KENALOG) 0 5 % cream apply 1 APPLICATION topically three times a day 30 g 3   • varenicline 0 5 MG X 11 & 1 MG X 42 tablet therapy pack Take 0 5 mg by mouth daily for 3 days, THEN 0 5 mg 2 (two) times a day for 4 days, THEN 1 mg 2 (two) times a day for 21 days  53 each 0   • tamsulosin (FLOMAX) 0 4 mg Take 1 capsule (0 4 mg total) by mouth daily with dinner for 5 days Start taking medication 3 days prior to surgery 5 capsule 0     No current facility-administered medications for this visit  Review of Systems   Constitutional: Negative for appetite change, chills, diaphoresis, fatigue and fever  HENT: Positive for congestion, postnasal drip and sinus pressure  Negative for trouble swallowing  Respiratory: Negative for chest tightness and shortness of breath  Skin:        Lesion on face   Neurological: Negative for dizziness, light-headedness and headaches  Objective:  Vitals:    01/03/23 1607   BP: 140/84   BP Location: Left arm   Patient Position: Sitting   Cuff Size: Adult   Pulse: 78   Temp: 97 8 °F (36 6 °C)   TempSrc: Tympanic   SpO2: 99%   Weight: 82 6 kg (182 lb)   Height: 5' 10" (1 778 m)     Body mass index is 26 11 kg/m²  Physical Exam  Vitals reviewed  Constitutional:       Appearance: Normal appearance  HENT:      Head: Normocephalic  Skin:     Findings: Lesion present  Comments: Wart-like lesion on right cheek  Area around is dry and slightly discolored  Neurological:      Mental Status: He is alert and oriented to person, place, and time     Psychiatric:         Mood and Affect: Mood normal          Behavior: Behavior normal

## 2023-01-31 ENCOUNTER — CONSULT (OUTPATIENT)
Dept: PLASTIC SURGERY | Facility: CLINIC | Age: 53
End: 2023-01-31

## 2023-01-31 VITALS
TEMPERATURE: 97.8 F | HEART RATE: 68 BPM | SYSTOLIC BLOOD PRESSURE: 145 MMHG | BODY MASS INDEX: 27.06 KG/M2 | WEIGHT: 189 LBS | HEIGHT: 70 IN | DIASTOLIC BLOOD PRESSURE: 96 MMHG

## 2023-01-31 DIAGNOSIS — L98.9 FACIAL SKIN LESION: ICD-10-CM

## 2023-02-01 NOTE — PROGRESS NOTES
Assessment/Plan   57-year-old male with skin lesion right cheek suspicious for basal cell cancer  1 )  Discussed with the patient I do feel is prudent to perform a biopsy today  2 )  Discussed the risks, benefits, alternatives of biopsy, please refer to the procedure note  3 )  Biopsy performed in the procedure room, we will plan for possible excision versus referral to Mohs surgery based on the results of the biopsy    Discussion--I discussed with the patient that this lesion is somewhat concerning for basal cell cancer, I discussed with him the natural history of basal cell cancer and the treatment options including Mohs versus wide local excision, I discussed with the patient that Mohs is the best treatment option for this particular area, I also discussed with the patient the possibility of more benign lesions such as an inclusion cyst and the risks, benefits, alternatives of excision, I discussed with the patient I do feel that the lesion would benefit from biopsy, we discussed the risks, benefits, and alternatives of punch biopsy including risk of bleeding, infection, scarring, poor wound healing, damage to surrounding and underlying structures, need for further surgery, need for multiple procedures, need for staged procedure, damage to surrounding and underlying structures, all the patient's questions were answered to his satisfaction, informed consent obtained and signed and a punch biopsy was performed  Please refer to the operative note for details  Counseling dominated visit, total counseling time 25 minutes, total visit time 35 minutes, separate service was performed today in addition to the consultation the biopsy was performed as the procedure was available, please refer to the note for details  Subjective   Patient ID: Jon Martino is a 52 y o  male      Vitals:    01/31/23 1428   BP: 145/96   Pulse: 68   Temp: 97 8 °F (36 6 °C)     HPI  Patient is a 57-year-old male who is here today to discuss a skin lesion on his right preauricular area  The patient states that the lesions been there for several months, it is associated with pruritus but it does not drain, he also states that it will sometimes scab over with crust, he has no history of skin cancer, he does have a large amount of sun exposure, he is a current smoker, is not take steroids or blood thinners  Does not remember any antecedent trauma to the area  The following portions of the patient's history were reviewed and updated as appropriate: allergies, current medications, past family history, past medical history, past social history, past surgical history and problem list     Review of Systems   Skin:        Per hpi       Objective   Physical Exam   Constitutional  He appears well-developed and well-nourished  Eyes  Pupils are equal, round, and reactive to light  System normal      General -             Right: Right eye extraocular movements are normal              Left: Left eye extraocular movements are normal        Skin    1-1/2 cm x 1-1/2 cm nodular lesion concerning for basal cell cancer, no adenopathy     Psychiatric  He has a normal mood and affect   His behavior is normal  Judgment and thought content normal        Past Medical History:   Diagnosis Date   • Asthma    • Erectile dysfunction    • Hyperlipidemia    • Kidney stone    • Psoriasis      Past Surgical History:   Procedure Laterality Date   • COLONOSCOPY     • HAND SURGERY Right    • HERNIA REPAIR Bilateral 11/17/2021    Procedure: REPAIR HERNIA INGUINAL, LAPAROSCOPIC BILATERAL WITH MESH, TRANS ABDOMINAL;  Surgeon: Jeet Laura MD;  Location: [facility] OR;  Service: General   • TONSILLECTOMY     • WISDOM TOOTH EXTRACTION      teeth extraction     Current Outpatient Medications   Medication Instructions   • Flovent  MCG/ACT inhaler inhale 2 puffs by mouth and INTO THE LUNGS twice a day Rinse mouth after use   • fluticasone (FLONASE) 50 mcg/act nasal spray 1 spray, Nasal, Daily   • Multiple Vitamin (multivitamin) capsule 1 capsule, Daily   • Naproxen Sodium (ALEVE PO) 200 mg   • omeprazole (PriLOSEC) 20 mg delayed release capsule take 1 capsule by mouth once daily   • tamsulosin (FLOMAX) 0 4 mg, Oral, Daily with dinner, Start taking medication 3 days prior to surgery   • triamcinolone (KENALOG) 0 5 % cream apply 1 APPLICATION topically three times a day   • varenicline 0 5 MG X 11 & 1 MG X 42 tablet therapy pack Take 0 5 mg by mouth daily for 3 days, THEN 0 5 mg 2 (two) times a day for 4 days, THEN 1 mg 2 (two) times a day for 21 days         Social History     Social History Narrative   • Not on file     Social History     Tobacco Use   Smoking Status Every Day   • Packs/day: 1 50   • Years: 35 00   • Pack years: 52 50   • Types: Cigarettes   Smokeless Tobacco Former

## 2023-02-01 NOTE — PROGRESS NOTES
Operative Note    Pre-op Dx: 1 5 cm x 1 5 cm lesion right preauricular area suspicious for basal cell cancer    Post op Dx: Same    Procedure: 3 mm punch biopsy was superficial closure of the right cheek lesion    Surgeon: Doc Molina MD    Assistant: None    EBL: <1mL    Specimen: skin lesion    Complication: none    Anesthesia: 2mL marcaine    Procedure Detail:  Mr Joanna Miller is a 47-year-old male with a history of a skin lesion on his right cheek/preauricular area, the lesion was associate with pruritus and crusting and was suspicious for basal cell cancer, I discussed with the patient that I felt biopsy would be prudent to obtain the diagnosis, we discussed the risks, benefits, alternatives including the risk of bleeding, infection, scarring, poor wound healing, damage to surrounding underlying structures, need for further surgery, need for multiple procedures, recurrence, damage to surrounding and underlying structures, all the patient's questions were answered to his satisfaction, informed consent obtained and signed and patient was brought to the procedure room to have the procedure performed  The area was identified, under aseptic conditions Marcaine was injected into the area after sufficient time I passed for the onset of anesthesia he was prepped and draped in the standard surgical fashion, timeout is performed and the surgical site identified  At that point using a 3 mm biopsy punch a specimen in the center of the lesion was obtained as a full-thickness specimen  This was passed off for specimen and the area was closed with one 3-0 chromic simple suture  The patient tolerated the procedure well, we will have the patient follow-up pending the results of the biopsy

## 2023-02-06 ENCOUNTER — TELEPHONE (OUTPATIENT)
Dept: PLASTIC SURGERY | Facility: CLINIC | Age: 53
End: 2023-02-06

## 2023-02-06 DIAGNOSIS — L98.9 FACIAL SKIN LESION: Primary | ICD-10-CM

## 2023-02-06 NOTE — TELEPHONE ENCOUNTER
----- Message from Flavio Altamirano sent at 2/6/2023 10:45 AM EST -----  Regarding: exc of facial lesion  Good Morning! This patient's wife called in regards to his biopsy  He wants to go ahead and have the facial lesion removed with Dr Laron Dumont  He already has a f/u appt  With [place] [facility] on feb 24 but I think that appt is separate  Would you please be able to call the patient back to set up an exc with Laron Dumont or if it has to be in the OR pls let me know and I can ask Balbir Gonsalez to call them back  Thank you so much !! :)     Have a good day!

## 2023-02-07 NOTE — TELEPHONE ENCOUNTER
Called patient and confirmed he received a call to review pathology and a call to schedule MOHS  Patient had no questions or concerns and confirmed follow up appointment

## 2023-02-23 ENCOUNTER — CONSULT (OUTPATIENT)
Dept: DERMATOLOGY | Facility: CLINIC | Age: 53
End: 2023-02-23

## 2023-02-23 VITALS — TEMPERATURE: 98.1 F

## 2023-02-23 DIAGNOSIS — L98.9 FACIAL SKIN LESION: ICD-10-CM

## 2023-02-23 NOTE — PATIENT INSTRUCTIONS
Assessment and Plan:  Repair will require coordination with Plastic Surgery or Oculofacial Plastic Surgery: Yes, Plastic Surgery: Dr Valorie Sanchez   Follow pre-operative instructions given from Plastic Surgery

## 2023-02-28 ENCOUNTER — OFFICE VISIT (OUTPATIENT)
Dept: PLASTIC SURGERY | Facility: CLINIC | Age: 53
End: 2023-02-28

## 2023-02-28 VITALS
TEMPERATURE: 98.3 F | HEIGHT: 70 IN | HEART RATE: 74 BPM | DIASTOLIC BLOOD PRESSURE: 92 MMHG | WEIGHT: 192 LBS | BODY MASS INDEX: 27.49 KG/M2 | SYSTOLIC BLOOD PRESSURE: 140 MMHG

## 2023-02-28 DIAGNOSIS — L98.9 FACIAL SKIN LESION: Primary | ICD-10-CM

## 2023-03-02 NOTE — PROGRESS NOTES
Patient is a 57-year-old male here today to follow-up biopsy results for his right cheek, the patient had a biopsy performed which was positive for squamous cell cancer, he has subsequently been referred to Mohs surgery and we are prepping today for right cheek reconstruction  The patient otherwise has no questions, concerns or complaints  Physical exam breast-biopsy site well-healing    Discussion--I discussed with patient the risks, benefits, and alternatives for post Mohs reconstruction, I discussed with him the option of skin graft, versus local tissue rearrangement versus regional tissue rearrangement and regional flap, I discussed with him the risks, benefits, alternatives of each including the risk of bleeding, infection, scarring, poor wound healing, damage to surrounding and underlying structures, need for further surgery, need for multiple procedures, need for donor site, graft failure, partial graft failure, flap failure, partial flap failure, need for further surgery, need for multiple procedures, I discussed the patient because of the smoking history is relatively high risk for poor wound healing and poor scarring, we discussed risk of seroma, DVT, all the patient's questions were answered to his satisfaction, informed consent obtained and signed, will proceed to the OR as scheduled and coordinate with Mohs surgery    Counseling dominated visit, total counseling time 35 minutes, total visit time 35 minutes

## 2023-04-25 NOTE — H&P
Assessment/Plan   47-year-old male with skin lesion right cheek suspicious for basal cell cancer  1 )  Discussed with the patient I do feel is prudent to perform a biopsy today  2 )  Discussed the risks, benefits, alternatives of biopsy, please refer to the procedure note  3 )  Biopsy performed in the procedure room, we will plan for possible excision versus referral to Mohs surgery based on the results of the biopsy     Discussion--I discussed with the patient that this lesion is somewhat concerning for basal cell cancer, I discussed with him the natural history of basal cell cancer and the treatment options including Mohs versus wide local excision, I discussed with the patient that Mohs is the best treatment option for this particular area, I also discussed with the patient the possibility of more benign lesions such as an inclusion cyst and the risks, benefits, alternatives of excision, I discussed with the patient I do feel that the lesion would benefit from biopsy, we discussed the risks, benefits, and alternatives of punch biopsy including risk of bleeding, infection, scarring, poor wound healing, damage to surrounding and underlying structures, need for further surgery, need for multiple procedures, need for staged procedure, damage to surrounding and underlying structures, all the patient's questions were answered to his satisfaction, informed consent obtained and signed and a punch biopsy was performed    Please refer to the operative note for details      Counseling dominated visit, total counseling time 25 minutes, total visit time 35 minutes, separate service was performed today in addition to the consultation the biopsy was performed as the procedure was available, please refer to the note for details      Subjective   Patient ID: Michelle Kessler is a 52 y o  male          Vitals:     01/31/23 1428   BP: 145/96   Pulse: 68   Temp: 97 8 °F (36 6 °C)      HPI  Patient is a 47-year-old male who is here today to discuss a skin lesion on his right preauricular area  The patient states that the lesions been there for several months, it is associated with pruritus but it does not drain, he also states that it will sometimes scab over with crust, he has no history of skin cancer, he does have a large amount of sun exposure, he is a current smoker, is not take steroids or blood thinners  Does not remember any antecedent trauma to the area         The following portions of the patient's history were reviewed and updated as appropriate: allergies, current medications, past family history, past medical history, past social history, past surgical history and problem list      Review of Systems   Skin:        Per hpi         Objective   Physical Exam   Constitutional  He appears well-developed and well-nourished       Eyes  Pupils are equal, round, and reactive to light  System normal       General -             Right: Right eye extraocular movements are normal              Left: Left eye extraocular movements are normal          Skin    1-1/2 cm x 1-1/2 cm nodular lesion concerning for basal cell cancer, no adenopathy      Psychiatric  He has a normal mood and affect   His behavior is normal  Judgment and thought content normal          Medical History        Past Medical History:   Diagnosis Date   • Asthma     • Erectile dysfunction     • Hyperlipidemia     • Kidney stone     • Psoriasis           Surgical History         Past Surgical History:   Procedure Laterality Date   • COLONOSCOPY       • HAND SURGERY Right     • HERNIA REPAIR Bilateral 11/17/2021     Procedure: REPAIR HERNIA INGUINAL, LAPAROSCOPIC BILATERAL WITH MESH, TRANS ABDOMINAL;  Surgeon: Aarti Meeks MD;  Location: [facility] OR;  Service: General   • TONSILLECTOMY       • WISDOM TOOTH EXTRACTION         teeth extraction              Current Outpatient Medications   Medication Instructions   • Flovent  MCG/ACT inhaler inhale 2 puffs by mouth and INTO THE LUNGS twice a day Rinse mouth after use   • fluticasone (FLONASE) 50 mcg/act nasal spray 1 spray, Nasal, Daily   • Multiple Vitamin (multivitamin) capsule 1 capsule, Daily   • Naproxen Sodium (ALEVE PO) 200 mg   • omeprazole (PriLOSEC) 20 mg delayed release capsule take 1 capsule by mouth once daily   • tamsulosin (FLOMAX) 0 4 mg, Oral, Daily with dinner, Start taking medication 3 days prior to surgery   • triamcinolone (KENALOG) 0 5 % cream apply 1 APPLICATION topically three times a day   • varenicline 0 5 MG X 11 & 1 MG X 42 tablet therapy pack Take 0 5 mg by mouth daily for 3 days, THEN 0 5 mg 2 (two) times a day for 4 days, THEN 1 mg 2 (two) times a day for 21 days          Social History          Social History Narrative   • Not on file      Social History           Tobacco Use   Smoking Status Every Day   • Packs/day: 1 50   • Years: 35 00   • Pack years: 52 50   • Types: Cigarettes   Smokeless Tobacco Former

## 2023-05-03 NOTE — PRE-PROCEDURE INSTRUCTIONS
Pre-Surgery Instructions:   Medication Instructions   • Flovent  MCG/ACT inhaler Take day of surgery  • fluticasone (FLONASE) 50 mcg/act nasal spray Uses PRN- DO NOT take day of surgery   • NON FORMULARY Do not smoke marijuana for 24 hours prior to surgery    • omeprazole (PriLOSEC) 20 mg delayed release capsule Uses PRN- OK to take day of surgery   • triamcinolone (KENALOG) 0 5 % cream Stop taking 1 day prior to surgery  Covid screening negative as per patient  Reviewed with patient via phone all medication instructions  Advised not to take any NSAID's, Vitamins or Herbal products for 7 days prior to the DOS  Acetaminophen products are ok to take  Reviewed showering instructions as given by surgical office  Instructed to call office with any questions or concerns  Instructed not to smoke any cigarettes or marijuana the day before or the DOS  Instructed about NPO after midnight the night before DOS, except sips of water with allowed medications in AM on DOS  Informed about call from [facility] with the time to arrive for the scheduled surgery  Patient verbalized understanding

## 2023-05-08 ENCOUNTER — APPOINTMENT (OUTPATIENT)
Dept: LAB | Facility: HOSPITAL | Age: 53
End: 2023-05-08
Attending: PLASTIC SURGERY

## 2023-05-08 DIAGNOSIS — L98.9 SKIN LESION: ICD-10-CM

## 2023-05-08 LAB
ANION GAP SERPL CALCULATED.3IONS-SCNC: 2 MMOL/L (ref 4–13)
BASOPHILS # BLD AUTO: 0.04 THOUSANDS/ÂΜL (ref 0–0.1)
BASOPHILS NFR BLD AUTO: 0 % (ref 0–1)
BUN SERPL-MCNC: 10 MG/DL (ref 5–25)
CALCIUM SERPL-MCNC: 9.4 MG/DL (ref 8.4–10.2)
CHLORIDE SERPL-SCNC: 106 MMOL/L (ref 96–108)
CO2 SERPL-SCNC: 31 MMOL/L (ref 21–32)
CREAT SERPL-MCNC: 0.96 MG/DL (ref 0.6–1.3)
EOSINOPHIL # BLD AUTO: 0.05 THOUSAND/ÂΜL (ref 0–0.61)
EOSINOPHIL NFR BLD AUTO: 1 % (ref 0–6)
ERYTHROCYTE [DISTWIDTH] IN BLOOD BY AUTOMATED COUNT: 13.4 % (ref 11.6–15.1)
GFR SERPL CREATININE-BSD FRML MDRD: 88 ML/MIN/1.73SQ M
GLUCOSE P FAST SERPL-MCNC: 94 MG/DL (ref 65–99)
HCT VFR BLD AUTO: 49.5 % (ref 36.5–49.3)
HGB BLD-MCNC: 16.2 G/DL (ref 12–17)
IMM GRANULOCYTES # BLD AUTO: 0.02 THOUSAND/UL (ref 0–0.2)
IMM GRANULOCYTES NFR BLD AUTO: 0 % (ref 0–2)
LYMPHOCYTES # BLD AUTO: 2.54 THOUSANDS/ÂΜL (ref 0.6–4.47)
LYMPHOCYTES NFR BLD AUTO: 27 % (ref 14–44)
MCH RBC QN AUTO: 30.7 PG (ref 26.8–34.3)
MCHC RBC AUTO-ENTMCNC: 32.7 G/DL (ref 31.4–37.4)
MCV RBC AUTO: 94 FL (ref 82–98)
MONOCYTES # BLD AUTO: 0.71 THOUSAND/ÂΜL (ref 0.17–1.22)
MONOCYTES NFR BLD AUTO: 8 % (ref 4–12)
NEUTROPHILS # BLD AUTO: 5.9 THOUSANDS/ÂΜL (ref 1.85–7.62)
NEUTS SEG NFR BLD AUTO: 64 % (ref 43–75)
NRBC BLD AUTO-RTO: 0 /100 WBCS
PLATELET # BLD AUTO: 226 THOUSANDS/UL (ref 149–390)
PMV BLD AUTO: 9.2 FL (ref 8.9–12.7)
POTASSIUM SERPL-SCNC: 4.3 MMOL/L (ref 3.5–5.3)
RBC # BLD AUTO: 5.27 MILLION/UL (ref 3.88–5.62)
SODIUM SERPL-SCNC: 139 MMOL/L (ref 135–147)
WBC # BLD AUTO: 9.26 THOUSAND/UL (ref 4.31–10.16)

## 2023-05-10 ENCOUNTER — PROCEDURE VISIT (OUTPATIENT)
Dept: DERMATOLOGY | Facility: CLINIC | Age: 53
End: 2023-05-10

## 2023-05-10 ENCOUNTER — ANESTHESIA EVENT (OUTPATIENT)
Dept: PERIOP | Facility: HOSPITAL | Age: 53
End: 2023-05-10

## 2023-05-10 VITALS
BODY MASS INDEX: 25.91 KG/M2 | WEIGHT: 181 LBS | RESPIRATION RATE: 18 BRPM | DIASTOLIC BLOOD PRESSURE: 70 MMHG | HEIGHT: 70 IN | TEMPERATURE: 98.9 F | SYSTOLIC BLOOD PRESSURE: 126 MMHG | HEART RATE: 68 BPM

## 2023-05-10 DIAGNOSIS — D04.39 SQUAMOUS CELL CARCINOMA IN SITU (SCCIS) OF SKIN OF RIGHT CHEEK: ICD-10-CM

## 2023-05-10 NOTE — PATIENT INSTRUCTIONS
Mohs Microscopic Surgery After Care    WOUND CARE AFTER SURGERY:    Do NOT to remove the pressure bandage  Keep the area clean and dry while this bandage is on       IF BLEEDING IS NOTICED:    Place a clean cloth over the area and apply firm pressure for thirty minutes  Check the wound ONLY after 30 minutes of direct pressure; do not cheat and sneak a peak, as that does not count  If bleeding persists after 30 minutes of legitimate direct pressure, then try one more round of direct pressure to the area  Should the bleeding become heavier or not stop after the second attempt, call [facility] directly at [phone] (SKIN)  Your call will get routed to the dermatology surgeon on call even after hours

## 2023-05-10 NOTE — PROGRESS NOTES
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      Past Medical History:   Diagnosis Date   • Asthma    • Erectile dysfunction    • GERD (gastroesophageal reflux disease)    • Hyperlipidemia    • Kidney stone    • Psoriasis    • Squamous cell skin cancer 2023    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       Past Surgical History:   Procedure Laterality Date   • COLONOSCOPY     • HAND SURGERY Right    • HERNIA REPAIR Bilateral 2021    Procedure: REPAIR HERNIA INGUINAL, LAPAROSCOPIC BILATERAL WITH MESH, TRANS ABDOMINAL;  Surgeon: Felipe Hall MD;  Location: [facility];  Service: General   • MOHS SURGERY  05/10/2023    Right cheek-Dr Jordon Nunez   • SKIN BIOPSY     • TONSILLECTOMY     • WISDOM TOOTH EXTRACTION      teeth extraction         Current Outpatient Medications:   •  Flovent  MCG/ACT inhaler, inhale 2 puffs by mouth and INTO THE LUNGS twice a day Rinse mouth after use, Disp: 12 g, Rfl: 0  •  fluticasone (FLONASE) 50 mcg/act nasal spray, 1 spray into each nostril daily, Disp: 16 g, Rfl: 0  •  NON FORMULARY, Medical Marijuana daily at bedtime, Disp: , Rfl:   •  omeprazole (PriLOSEC) 20 mg delayed release capsule, take 1 capsule by mouth once daily (Patient taking differently: Takes as needed), Disp: 30 capsule, Rfl: 5  •  triamcinolone (KENALOG) 0 5 % cream, Apply topically 3 (three) times a day, Disp: 30 g, Rfl: 0  •  Multiple Vitamin (multivitamin) capsule, Take 1 capsule by mouth daily (Patient not taking: Reported on 2023), Disp: , Rfl:   •  Naproxen Sodium (ALEVE PO), Take 200 mg by mouth (Patient not taking: Reported on 2023), Disp: , Rfl:   •  tamsulosin (FLOMAX) 0 4 mg, Take 1 capsule (0 4 mg total) by mouth daily with dinner for 5 days Start taking medication 3 days prior to surgery (Patient not taking: Reported on 5/3/2023), Disp: 5 capsule, Rfl: 0  •  varenicline 0 5 MG X 11 & 1 MG X 42 tablet therapy pack, Take 0 5 mg by mouth daily for 3 days, THEN 0 5 mg 2 (two) times a day for 4 days, THEN 1 mg 2 (two) times a day for 21 days  (Patient not taking: Reported on 1/31/2023), Disp: 53 each, Rfl: 0    No Known Allergies    Physical Exam:   Vitals:    05/10/23 0903   BP: 126/70   Pulse: 68   Resp: 18   Temp: 98 9 °F (37 2 °C)     General: Awake, Alert, Oriented x 3, Mood and affect appropriate  Respiratory: Respirations even and unlabored  Cardiovascular: Peripheral pulses intact; no edema  Musculoskeletal Exam: N/A    Assessment: 1 5 cm x 1 2 cm crusted pink nodule on the right cheek    Assessment: Biopsy confirmed squamous lee carcinoma at least in situ    Plan: Mohs    MOHS Procedure Timeout    Flowsheet Row Most Recent Value   Timeout: 9424   Patient Identity Verified: Yes   Correct Site Verified: Yes   Correct Procedure Verified: Yes          MOHS Diagnosis/Indication/Location/ID    Flowsheet Row Most Recent Value   Pathology Type Squamous cell carcinoma   Anatomic Site right cheek or buccal area   Indications for MOHS tumor location, large tumor size   MOHS ID [id number]          MOHS Site/Accession/Pre-Post    Flowsheet Row Most Recent Value   Original Site Identified (as submitted by referring clinician) Note, Referral, Photo   Biopsy Accession/Specimen # (as submitted by referring clincian) [id number]   Pre-MOHS Size Length (cm) 1 5   Pre-MOHS Size Width (cm) 1 2   Post-MOHS Size-Length (cm) 2 5   Post MOHS Size-Width (cm) 1 9   Repair Type Patient referred to another physician  [Dr Gary Tan]   Anesthetic Used 1% Lidocaine with epinephrine  [with Bicarb]          MOHS Tumor Stage 1 Information    Flowsheet Row Most Recent Value   Tissue Sections (blocks) 2   Microscopic Exam Section 1: No tumor identified in section     Microscopic Exam Section 2: Within the epidermis is a full-thickness proliferation of atypical keratinocytes with mitoses consistent with squamous cell carcinoma in situ  The overlying stratum corneum demonstrates parakeratosis  No invasion is noted  Tumor Clear After Stage I? No          MOHS Tumor Stage 2 Information    Flowsheet Row Most Recent Value   Tissue Sections (blocks) 1   Microscopic Exam Section 1: Within the epidermis is a full-thickness proliferation of atypical keratinocytes with mitoses consistent with squamous cell carcinoma in situ  The overlying stratum corneum demonstrates parakeratosis  No invasion is noted  Tumor Clear After Stage II? No          MOHS Tumor Stage 3 Information    Flowsheet Row Most Recent Value   Tissue Sections (blocks) 1   Microscopic Exam Section 1: No tumor identified in section  Tumor Clear After Stage III? Yes                Patient identified, procedure verified, site identified and verified  Time out completed  Surgical removal of the lesion discussed with the patient (risks and benefits, including possibility of scarring, infection, recurrence or potential for further treatment)  I have specifically identified the site with the patient  I have discussed the fact that the patient will have a scar after the procedure regardless of granulation or repair with sutures  I have discussed that the repair options can range from granulation in some cases to linear or curvilinear closures to larger flaps or grafts  There are sometimes flaps or grafts used that require multiples stages of surgery and will not be completed today, rather be completed over a series of appointments  I have discussed that occasionally due to location, size or depth of the lesion I may recommend consultation with and transfer of care for further removal or the reconstruction to another provider such as ophthalmology surgery, plastic surgery, ENT surgery, or surgical oncology   There are cases in which other testing such as imaging with MRI or CT scan or testing of lymph nodes is recommended because of the nature/depth/location of tumor seen during the removal  There is a risk of injury to nerves causing temporary or permanent numbness or the inability to move muscles full such as the inability to lift eyebrows  Questions answered and verbal and written consent was obtained  The tumor qualifies for Mohs based on AUC criteria  Dr Carly Pires served as the surgeon and pathologist during the procedure  The patient tolerated the procedure well  The wound was dressed with a non-stick pad, and a compression dressing  Wound care was discussed with the patient, and a wound care instruction sheet was given  The patient is due to see Dr Isa Tillman (Plastic Surgery) for closure on 05/11/23  Postoperative care: Wound care discussed at length  I urged the patient to call us if any problems or question should arise       Complications: none  Post-op medications: none  Patient condition after procedure: stable  Discharge plans: Closure on 5/11/23 with Dr Jered To,:  Reuben Eric MA am acting as a scribe while in the presence of the attending physician :       I,:  Aron Leiva MD personally performed the services described in this documentation    as scribed in my presence :

## 2023-05-11 ENCOUNTER — ANESTHESIA (OUTPATIENT)
Dept: PERIOP | Facility: HOSPITAL | Age: 53
End: 2023-05-11

## 2023-05-11 ENCOUNTER — HOSPITAL ENCOUNTER (OUTPATIENT)
Facility: HOSPITAL | Age: 53
Setting detail: OUTPATIENT SURGERY
Discharge: HOME | End: 2023-05-11
Attending: PLASTIC SURGERY | Admitting: PLASTIC SURGERY

## 2023-05-11 VITALS
WEIGHT: 180.56 LBS | DIASTOLIC BLOOD PRESSURE: 76 MMHG | HEART RATE: 62 BPM | RESPIRATION RATE: 19 BRPM | HEIGHT: 70 IN | SYSTOLIC BLOOD PRESSURE: 132 MMHG | BODY MASS INDEX: 25.85 KG/M2 | TEMPERATURE: 97.3 F | OXYGEN SATURATION: 96 %

## 2023-05-11 DIAGNOSIS — L98.9 SKIN LESION: ICD-10-CM

## 2023-05-11 DIAGNOSIS — K40.20 NON-RECURRENT BILATERAL INGUINAL HERNIA WITHOUT OBSTRUCTION OR GANGRENE: Primary | ICD-10-CM

## 2023-05-11 RX ORDER — EPHEDRINE SULFATE 50 MG/ML
INJECTION INTRAVENOUS AS NEEDED
Status: DISCONTINUED | OUTPATIENT
Start: 2023-05-11 | End: 2023-05-11

## 2023-05-11 RX ORDER — SODIUM CHLORIDE 9 MG/ML
125 INJECTION, SOLUTION INTRAVENOUS CONTINUOUS
Status: DISCONTINUED | OUTPATIENT
Start: 2023-05-11 | End: 2023-05-11 | Stop reason: HOSPADM

## 2023-05-11 RX ORDER — PROPOFOL 10 MG/ML
INJECTION, EMULSION INTRAVENOUS AS NEEDED
Status: DISCONTINUED | OUTPATIENT
Start: 2023-05-11 | End: 2023-05-11

## 2023-05-11 RX ORDER — GABAPENTIN 300 MG/1
300 CAPSULE ORAL ONCE
Status: COMPLETED | OUTPATIENT
Start: 2023-05-11 | End: 2023-05-11

## 2023-05-11 RX ORDER — CEPHALEXIN 500 MG/1
500 CAPSULE ORAL EVERY 6 HOURS SCHEDULED
Qty: 28 CAPSULE | Refills: 0 | Status: SHIPPED | OUTPATIENT
Start: 2023-05-11 | End: 2023-05-18

## 2023-05-11 RX ORDER — HYDROMORPHONE HCL/PF 1 MG/ML
0.2 SYRINGE (ML) INJECTION
Status: DISCONTINUED | OUTPATIENT
Start: 2023-05-11 | End: 2023-05-11 | Stop reason: HOSPADM

## 2023-05-11 RX ORDER — MIDAZOLAM HYDROCHLORIDE 2 MG/2ML
INJECTION, SOLUTION INTRAMUSCULAR; INTRAVENOUS AS NEEDED
Status: DISCONTINUED | OUTPATIENT
Start: 2023-05-11 | End: 2023-05-11

## 2023-05-11 RX ORDER — LIDOCAINE HYDROCHLORIDE 20 MG/ML
INJECTION, SOLUTION EPIDURAL; INFILTRATION; INTRACAUDAL; PERINEURAL AS NEEDED
Status: DISCONTINUED | OUTPATIENT
Start: 2023-05-11 | End: 2023-05-11

## 2023-05-11 RX ORDER — ONDANSETRON 2 MG/ML
INJECTION INTRAMUSCULAR; INTRAVENOUS AS NEEDED
Status: DISCONTINUED | OUTPATIENT
Start: 2023-05-11 | End: 2023-05-11

## 2023-05-11 RX ORDER — ACETAMINOPHEN 325 MG/1
975 TABLET ORAL ONCE
Status: COMPLETED | OUTPATIENT
Start: 2023-05-11 | End: 2023-05-11

## 2023-05-11 RX ORDER — CEFAZOLIN SODIUM 2 G/50ML
2000 SOLUTION INTRAVENOUS ONCE
Status: COMPLETED | OUTPATIENT
Start: 2023-05-11 | End: 2023-05-11

## 2023-05-11 RX ORDER — MAGNESIUM HYDROXIDE 1200 MG/15ML
LIQUID ORAL AS NEEDED
Status: DISCONTINUED | OUTPATIENT
Start: 2023-05-11 | End: 2023-05-11 | Stop reason: HOSPADM

## 2023-05-11 RX ORDER — LIDOCAINE HYDROCHLORIDE 10 MG/ML
0.5 INJECTION, SOLUTION EPIDURAL; INFILTRATION; INTRACAUDAL; PERINEURAL ONCE AS NEEDED
Status: DISCONTINUED | OUTPATIENT
Start: 2023-05-11 | End: 2023-05-11 | Stop reason: HOSPADM

## 2023-05-11 RX ORDER — DEXMEDETOMIDINE HYDROCHLORIDE 100 UG/ML
INJECTION, SOLUTION INTRAVENOUS AS NEEDED
Status: DISCONTINUED | OUTPATIENT
Start: 2023-05-11 | End: 2023-05-11

## 2023-05-11 RX ORDER — LIDOCAINE HYDROCHLORIDE AND EPINEPHRINE 10; 10 MG/ML; UG/ML
INJECTION, SOLUTION INFILTRATION; PERINEURAL AS NEEDED
Status: DISCONTINUED | OUTPATIENT
Start: 2023-05-11 | End: 2023-05-11 | Stop reason: HOSPADM

## 2023-05-11 RX ORDER — FENTANYL CITRATE 50 UG/ML
INJECTION, SOLUTION INTRAMUSCULAR; INTRAVENOUS AS NEEDED
Status: DISCONTINUED | OUTPATIENT
Start: 2023-05-11 | End: 2023-05-11

## 2023-05-11 RX ORDER — HYDROMORPHONE HCL/PF 1 MG/ML
0.5 SYRINGE (ML) INJECTION
Status: DISCONTINUED | OUTPATIENT
Start: 2023-05-11 | End: 2023-05-11 | Stop reason: HOSPADM

## 2023-05-11 RX ORDER — GINSENG 100 MG
CAPSULE ORAL AS NEEDED
Status: DISCONTINUED | OUTPATIENT
Start: 2023-05-11 | End: 2023-05-11 | Stop reason: HOSPADM

## 2023-05-11 RX ORDER — METOCLOPRAMIDE HYDROCHLORIDE 5 MG/ML
10 INJECTION INTRAMUSCULAR; INTRAVENOUS ONCE AS NEEDED
Status: DISCONTINUED | OUTPATIENT
Start: 2023-05-11 | End: 2023-05-11 | Stop reason: HOSPADM

## 2023-05-11 RX ORDER — DIPHENHYDRAMINE HYDROCHLORIDE 50 MG/ML
12.5 INJECTION INTRAMUSCULAR; INTRAVENOUS ONCE AS NEEDED
Status: DISCONTINUED | OUTPATIENT
Start: 2023-05-11 | End: 2023-05-11 | Stop reason: HOSPADM

## 2023-05-11 RX ORDER — ONDANSETRON 2 MG/ML
4 INJECTION INTRAMUSCULAR; INTRAVENOUS ONCE AS NEEDED
Status: DISCONTINUED | OUTPATIENT
Start: 2023-05-11 | End: 2023-05-11 | Stop reason: HOSPADM

## 2023-05-11 RX ORDER — ACETAMINOPHEN 500 MG
1000 TABLET ORAL 3 TIMES DAILY
Qty: 30 TABLET | Refills: 0 | Status: SHIPPED | OUTPATIENT
Start: 2023-05-11 | End: 2023-05-16

## 2023-05-11 RX ORDER — FENTANYL CITRATE/PF 50 MCG/ML
50 SYRINGE (ML) INJECTION
Status: DISCONTINUED | OUTPATIENT
Start: 2023-05-11 | End: 2023-05-11 | Stop reason: HOSPADM

## 2023-05-11 RX ORDER — SODIUM CHLORIDE, SODIUM LACTATE, POTASSIUM CHLORIDE, CALCIUM CHLORIDE 600; 310; 30; 20 MG/100ML; MG/100ML; MG/100ML; MG/100ML
125 INJECTION, SOLUTION INTRAVENOUS CONTINUOUS
Status: DISCONTINUED | OUTPATIENT
Start: 2023-05-11 | End: 2023-05-11 | Stop reason: HOSPADM

## 2023-05-11 RX ORDER — OXYCODONE HYDROCHLORIDE 5 MG/1
5 TABLET ORAL 3 TIMES DAILY PRN
Qty: 5 TABLET | Refills: 0 | Status: SHIPPED | OUTPATIENT
Start: 2023-05-11 | End: 2023-05-13

## 2023-05-11 RX ADMIN — ONDANSETRON 4 MG: 2 INJECTION INTRAMUSCULAR; INTRAVENOUS at 13:21

## 2023-05-11 RX ADMIN — MIDAZOLAM 2 MG: 1 INJECTION INTRAMUSCULAR; INTRAVENOUS at 12:48

## 2023-05-11 RX ADMIN — FENTANYL CITRATE 50 MCG: 50 INJECTION, SOLUTION INTRAMUSCULAR; INTRAVENOUS at 12:50

## 2023-05-11 RX ADMIN — PROPOFOL 200 MG: 10 INJECTION, EMULSION INTRAVENOUS at 12:51

## 2023-05-11 RX ADMIN — CEFAZOLIN SODIUM 2000 MG: 2 SOLUTION INTRAVENOUS at 12:55

## 2023-05-11 RX ADMIN — LIDOCAINE HYDROCHLORIDE 100 MG: 20 INJECTION, SOLUTION EPIDURAL; INFILTRATION; INTRACAUDAL; PERINEURAL at 12:51

## 2023-05-11 RX ADMIN — GABAPENTIN 300 MG: 300 CAPSULE ORAL at 11:42

## 2023-05-11 RX ADMIN — SODIUM CHLORIDE, SODIUM LACTATE, POTASSIUM CHLORIDE, AND CALCIUM CHLORIDE 125 ML/HR: .6; .31; .03; .02 INJECTION, SOLUTION INTRAVENOUS at 11:43

## 2023-05-11 RX ADMIN — EPHEDRINE SULFATE 5 MG: 50 INJECTION, SOLUTION INTRAVENOUS at 13:11

## 2023-05-11 RX ADMIN — ACETAMINOPHEN 975 MG: 325 TABLET ORAL at 11:42

## 2023-05-11 RX ADMIN — DEXMEDETOMIDINE HCL 8 MCG: 100 INJECTION INTRAVENOUS at 12:50

## 2023-05-11 NOTE — ANESTHESIA POSTPROCEDURE EVALUATION
Post-Op Assessment Note    CV Status:  Stable    Pain management: adequate     Mental Status:  Alert, sleepy and arousable   Hydration Status:  Euvolemic   PONV Controlled:  Controlled   Airway Patency:  Patent      Post Op Vitals Reviewed: Yes      Staff: Anesthesiologist, CRNA         There were no known notable events for this encounter      BP   100/58   Temp 97   Pulse 62   Resp 19   SpO2 97

## 2023-05-11 NOTE — OP NOTE
OPERATIVE REPORT  PATIENT NAME: Barbara Loza    :  1970  MRN: [id number]  Pt Location: [facility]OM 02    SURGERY DATE: 2023    Surgeon(s) and Role:     * Geraldo Guzman MD - Primary     * Prince Flora MD - Assisting    Preop Diagnosis:  Skin lesion [L98 9]    Post-Op Diagnosis Codes:     * Skin lesion [L98 9]    Procedure(s):  Right - RECONSTRUCTION OF MOHS DEFECT RIGHT CHEEK measuring 7 cm by 3 cm WITH LOCAL ADVANCEMENT FLAPS with a total mobilization of 2 5 cm x 2 5 cm    Specimen(s):  ID Type Source Tests Collected by Time Destination   1 : Right cheek posterior margin  Tissue Head TISSUE EXAM Geraldo Guzman MD 2023  1:11 PM    2 : Right ckeek anterior margin  Tissue Head TISSUE EXAM Geraldo Guzman MD 2023  1:12 PM        Estimated Blood Loss:   Minimal    Drains:  * No LDAs found *    Anesthesia Type:   General    Operative Indications:  Reconstruction of defect of the right cheek s/p Mohs procedure for squamous cell carcinoma at least in situ    Operative Findings:  See in operative note below  Complications:   None    Procedure and Technique:    The patient is a 52year old gentleman with a right cheek lesion significant for squamous cell carcinoma at least in situ who underwent Mohs procedure with Dermatology yesterday  He presents today for reconstruction of his right cheek defect  In the preoperative holding area, Dr Liliane Elizabeth discussed with the patient the risks, benefits, and alternatives for post Mohs reconstruction of his right cheek defect   We discussed with him the option of skin graft, versus local tissue rearrangement versus regional tissue rearrangement and regional flap, and we discussed with him the risks, benefits, alternatives of each, including, but not limited to, the risk of bleeding, infection, scarring, poor wound healing, damage to surrounding and underlying structures, need for further surgery, need for multiple procedures, need for donor site, graft failure, partial graft failure, flap failure, partial flap failure, need for further surgery, need for multiple procedures  Additionally, we discussed with the patient because of the smoking history is relatively high risk for poor wound healing and poor scarring as well as the risks of seroma and DVT  He demonstrated understanding of our discussion of the procedure as well as the risks, benefits, and alternatives, and all the patient's questions were answered to his satisfaction, informed consent obtained and signed, and we proceed with operative plan today as scheduled  The patient was first brought to the holding area where he was given preoperative antibiotics, and his SCDs were activated prior to the induction of anesthesia  He was then brought to the OR where he is identified verbally, by site, and by armband prior to the induction of anesthesia  After the induction of anesthesia he was placed in the supine position and after ensuring that all areas were adequately padded he was prepped with betadine being careful to avoid any betadine near his eyes which were protected  He was then draped in standard surgical fashion, timeout was performed and the surgical site identified  At this point, we turned our attention to the right cheek defect and including the planned incision, it measured 10 cm x 3 cm  An elliptical incision was made to include the defect and was excised and sent to Pathology as a specimen for analysis  The dissection was taken down through the skin to the subcutaneous tissue to the level and including the hair follicle  Once this was performed, the area could not be closed primarily without tension  At this point, advancement flaps were fashioned anteriorly and advanced and posteriorly within the subcutaneous tissue  This was done to perform closure without tension, the flap was undermined for approximately 2 5 cm anteriorly and posteriorly in order to achieve better closure   A multiple layer closure was then performed, 4-0 Monocryl was then used to close the deep dermal layer and a 5-0 Monocryl used to close the skin taking care to perform atraumatic handling of the skin  A postoperative dressing was applied over the incision with Steri-strips  The patient tolerated the procedure well without complication, and he was extubated and in stable condition sent to the recovery room  Dr Mauro García was present and scrubbed for the entirety of the procedure      Patient Disposition:  PACU then discharge to home      SIGNATURE: Merlinda Lease, MD  DATE: May 11, 2023  TIME: 2:02 PM

## 2023-05-11 NOTE — ANESTHESIA PREPROCEDURE EVALUATION
Procedure:  RECONSTRUCTION OF MOHS DEFECT RIGHT CHEEK WITH LOCAL FLAP (Right: Face)    Relevant Problems   ANESTHESIA (within normal limits)      CARDIO   (+) Hyperlipidemia      ENDO (within normal limits)      GI/HEPATIC   (+) GERD (gastroesophageal reflux disease)      /RENAL (within normal limits)      GYN (within normal limits)      HEMATOLOGY (within normal limits)      MUSCULOSKELETAL (within normal limits)      NEURO/PSYCH (within normal limits)      PULMONARY (within normal limits)        Physical Exam    Airway    Mallampati score: II  TM Distance: >3 FB  Neck ROM: full     Dental   No notable dental hx     Cardiovascular  Rhythm: regular, Rate: normal, Cardiovascular exam normal    Pulmonary  Pulmonary exam normal Breath sounds clear to auscultation,     Other Findings        Anesthesia Plan  ASA Score- 2     Anesthesia Type- general with ASA Monitors  Additional Monitors:   Airway Plan: LMA  Plan Factors-Exercise tolerance (METS): >4 METS  Chart reviewed  EKG reviewed  Imaging results reviewed  Existing labs reviewed  Patient summary reviewed  Obstructive sleep apnea risk education given perioperatively  Induction- intravenous  Postoperative Plan- Plan for postoperative opioid use  Informed Consent- Anesthetic plan and risks discussed with patient  I personally reviewed this patient with the CRNA  Discussed and agreed on the Anesthesia Plan with the CRNA  Beltran Weeks Recent labs personally reviewed:  Lab Results   Component Value Date    WBC 9 26 05/08/2023    HGB 16 2 05/08/2023     05/08/2023     Lab Results   Component Value Date     02/26/2016    K 4 3 05/08/2023    BUN 10 05/08/2023    CREATININE 0 96 05/08/2023       IChuck MD, have personally seen and evaluated the patient prior to anesthetic care  I have reviewed the pre-anesthetic record, and other medical records if appropriate to the anesthetic care    If a CRNA is involved in the case, I have reviewed the CRNA assessment, if present, and agree  Risks/benefits and alternatives discussed with patient including possible PONV, sore throat, and possibility of rare anesthetic and surgical emergencies

## 2023-05-11 NOTE — H&P
Assessment/Plan   57-year-old male with skin lesion right cheek BCC  1,) For local tissue rearrangement in OR       Discussion--I discussed with patient the risks, benefits, and alternatives for post Mohs reconstruction, I discussed with him the option of skin graft, versus local tissue rearrangement versus regional tissue rearrangement and regional flap, I discussed with him the risks, benefits, alternatives of each including the risk of bleeding, infection, scarring, poor wound healing, damage to surrounding and underlying structures, need for further surgery, need for multiple procedures, need for donor site, graft failure, partial graft failure, flap failure, partial flap failure, need for further surgery, need for multiple procedures, I discussed the patient because of the smoking history is relatively high risk for poor wound healing and poor scarring, we discussed risk of seroma, DVT, all the patient's questions were answered to his satisfaction, informed consent obtained and signed, will proceed to the OR as scheduled        Subjective   Patient ID: Ermias Hidalgo is a 52 y o  male          Vitals:     01/31/23 1428   BP: 145/96   Pulse: 68   Temp: 97 8 °F (36 6 °C)      HPI  Patient is a 57-year-old male who is here today to discuss a skin lesion on his right preauricular area  The patient states that the lesions been there for several months, it is associated with pruritus but it does not drain, he also states that it will sometimes scab over with crust, he has no history of skin cancer, he does have a large amount of sun exposure, he is a current smoker, is not take steroids or blood thinners  Does not remember any antecedent trauma to the area    The patient had biopsy which confirmed BCC and the patient had his Mohs resection yesterday         The following portions of the patient's history were reviewed and updated as appropriate: allergies, current medications, past family history, past medical history, past social history, past surgical history and problem list      Review of Systems   Skin:        Per hpi         Objective   Physical Exam   Constitutional  He appears well-developed and well-nourished       Eyes  Pupils are equal, round, and reactive to light  System normal         COR- RRR +s1s2  Pulm- CTA bilaterally  General -             Right: Right eye extraocular movements are normal              Left: Left eye extraocular movements are normal          Skin  [id number] full thickness defect right preauricular area      Psychiatric  He has a normal mood and affect   His behavior is normal  Judgment and thought content normal          Medical History        Past Medical History:   Diagnosis Date   • Asthma     • Erectile dysfunction     • Hyperlipidemia     • Kidney stone     • Psoriasis           Surgical History         Past Surgical History:   Procedure Laterality Date   • COLONOSCOPY       • HAND SURGERY Right     • HERNIA REPAIR Bilateral 11/17/2021     Procedure: REPAIR HERNIA INGUINAL, LAPAROSCOPIC BILATERAL WITH MESH, TRANS ABDOMINAL;  Surgeon: Hugh Bourne MD;  Location: [facility] OR;  Service: General   • TONSILLECTOMY       • WISDOM TOOTH EXTRACTION         teeth extraction              Current Outpatient Medications   Medication Instructions   • Flovent  MCG/ACT inhaler inhale 2 puffs by mouth and INTO THE LUNGS twice a day Rinse mouth after use   • fluticasone (FLONASE) 50 mcg/act nasal spray 1 spray, Nasal, Daily   • Multiple Vitamin (multivitamin) capsule 1 capsule, Daily   • Naproxen Sodium (ALEVE PO) 200 mg   • omeprazole (PriLOSEC) 20 mg delayed release capsule take 1 capsule by mouth once daily   • tamsulosin (FLOMAX) 0 4 mg, Oral, Daily with dinner, Start taking medication 3 days prior to surgery   • triamcinolone (KENALOG) 0 5 % cream apply 1 APPLICATION topically three times a day   • varenicline 0 5 MG X 11 & 1 MG X 42 tablet therapy pack Take 0 5 mg by mouth daily for 3 days, THEN 0 5 mg 2 (two) times a day for 4 days, THEN 1 mg 2 (two) times a day for 21 days          Social History          Social History Narrative   • Not on file      Social History           Tobacco Use   Smoking Status Every Day   • Packs/day: 1 50   • Years: 35 00   • Pack years: 52 50   • Types: Cigarettes   Smokeless Tobacco Former

## 2023-05-11 NOTE — DISCHARGE INSTR - AVS FIRST PAGE
"All Surgeries     You must be off all pain medications and have a clear field of vision before driving  For the next 24 hours:  Do not sign any legal documents or operate machinery  Have a responsible adult help you  Take it easy & rest   Clear liquids first, if no nausea, progress to soft diet, and then regular diet as tolerated  Take medications as ordered, and do not take any pain medication on an empty stomach  Once pain meds are finished you may alternate Tylenol & Advil as directed for pain  Make sure to take all antibiotics until completion  Avoid alcoholic beverages  Resume any prior medications at home unless otherwise instructed by Dr Ronit Ivory  Call Dr Ronit Ivory at [phone] -office,  Obvious bleeding, excessive swelling, and tenderness  Hardness of face on palpation  Swelling & hardness at eyelids  Fever over 101 0°   Shortness of breath, severe calf or thigh pain  Redness, odor, or pus at the wound {some oozing is normal from incision sites and may continue for several days ABD pads or feminine pads can be used for absorption )  Persistent vomiting or pain that is not relieved by your medication  To make your follow-up appointment , ask a question, or report a problem     You do not have to remove your white \"Steri-Strip\" bandage on top of your incision - you may shower in 48 hours, pat the white bandage dry and keep it on until the edges start to peel away  Avoid sun exposure to prevent poor scarring/dark scars after surgery      THERE ARE NO SUTURES TO REMOVE - ALL OF YOUR SUTURES ARE DISSOLVABLE  "

## 2023-08-12 DIAGNOSIS — J20.9 ACUTE BRONCHITIS, UNSPECIFIED ORGANISM: ICD-10-CM

## 2023-08-12 DIAGNOSIS — L40.9 PSORIASIS: ICD-10-CM

## 2023-08-14 RX ORDER — FLUTICASONE PROPIONATE 110 UG/1
2 AEROSOL, METERED RESPIRATORY (INHALATION) 2 TIMES DAILY
Qty: 12 G | Refills: 0 | Status: SHIPPED | OUTPATIENT
Start: 2023-08-14

## 2023-08-14 RX ORDER — TRIAMCINOLONE ACETONIDE 5 MG/G
CREAM TOPICAL 3 TIMES DAILY
Qty: 30 G | Refills: 0 | Status: SHIPPED | OUTPATIENT
Start: 2023-08-14

## 2023-09-08 DIAGNOSIS — J20.9 ACUTE BRONCHITIS, UNSPECIFIED ORGANISM: ICD-10-CM

## 2023-09-08 RX ORDER — DEXAMETHASONE 4 MG/1
TABLET ORAL
Qty: 12 G | Refills: 0 | Status: SHIPPED | OUTPATIENT
Start: 2023-09-08

## 2023-12-07 DIAGNOSIS — L40.9 PSORIASIS: ICD-10-CM

## 2023-12-07 DIAGNOSIS — J20.9 ACUTE BRONCHITIS, UNSPECIFIED ORGANISM: ICD-10-CM

## 2023-12-08 RX ORDER — FLUTICASONE PROPIONATE 110 UG/1
2 AEROSOL, METERED RESPIRATORY (INHALATION) 2 TIMES DAILY
Qty: 12 G | Refills: 0 | Status: SHIPPED | OUTPATIENT
Start: 2023-12-08

## 2023-12-08 RX ORDER — TRIAMCINOLONE ACETONIDE 5 MG/G
CREAM TOPICAL 3 TIMES DAILY
Qty: 30 G | Refills: 0 | Status: SHIPPED | OUTPATIENT
Start: 2023-12-08

## 2024-01-04 DIAGNOSIS — J20.9 ACUTE BRONCHITIS, UNSPECIFIED ORGANISM: ICD-10-CM

## 2024-01-04 RX ORDER — FLUTICASONE PROPIONATE 110 UG/1
AEROSOL, METERED RESPIRATORY (INHALATION)
Qty: 12 G | Refills: 0 | Status: SHIPPED | OUTPATIENT
Start: 2024-01-04

## 2024-01-04 NOTE — TELEPHONE ENCOUNTER
Called and spoke to pt - he's not in the area currently (is helping his dad in [place]) Will have wife call when he knows what is going on with his dad.

## 2024-04-11 DIAGNOSIS — L40.9 PSORIASIS: ICD-10-CM

## 2024-04-11 RX ORDER — TRIAMCINOLONE ACETONIDE 5 MG/G
CREAM TOPICAL 3 TIMES DAILY
Qty: 30 G | Refills: 0 | OUTPATIENT
Start: 2024-04-11

## 2024-05-02 DIAGNOSIS — L40.9 PSORIASIS: ICD-10-CM

## 2024-05-02 RX ORDER — TRIAMCINOLONE ACETONIDE 5 MG/G
CREAM TOPICAL 3 TIMES DAILY
Qty: 30 G | Refills: 0 | Status: SHIPPED | OUTPATIENT
Start: 2024-05-02

## 2024-06-17 ENCOUNTER — TELEPHONE (OUTPATIENT)
Age: 54
End: 2024-06-17

## 2024-06-17 NOTE — TELEPHONE ENCOUNTER
Wife Katyh  called  [phone] requesting Selina Loza to call her back. Patient had a stroke is in   [facility].  Kathy states, He has a stent in his brain, she would like to discuss giving provider information about his condition. Also would  like information on how to transfer him  back to [place] and into Acute rehab facility.      Kathy [phone]  Please call back.    Please Advise. Thank you.

## 2024-06-24 ENCOUNTER — TELEPHONE (OUTPATIENT)
Age: 54
End: 2024-06-24

## 2024-06-24 NOTE — TELEPHONE ENCOUNTER
Patient recently had a stroke while in [facility]. Was admitted to local hospital there.    Patient refused to stay in hospital.  So he was discharged.    Patient needs rehab as he currently is unable to speak and unstable gait.    Please contact patient's spouse with next steps as soon as possible.

## 2024-06-25 ENCOUNTER — TELEPHONE (OUTPATIENT)
Age: 54
End: 2024-06-25

## 2024-06-25 DIAGNOSIS — I63.9 APHASIA DUE TO ACUTE CEREBROVASCULAR ACCIDENT (CVA)  (HCC): ICD-10-CM

## 2024-06-25 DIAGNOSIS — R47.01 APHASIA DUE TO ACUTE CEREBROVASCULAR ACCIDENT (CVA)  (HCC): ICD-10-CM

## 2024-06-25 DIAGNOSIS — I69.398 ABNORMALITY OF GAIT FOLLOWING CEREBROVASCULAR ACCIDENT (CVA): Primary | ICD-10-CM

## 2024-06-25 DIAGNOSIS — R26.9 ABNORMALITY OF GAIT FOLLOWING CEREBROVASCULAR ACCIDENT (CVA): Primary | ICD-10-CM

## 2024-06-25 DIAGNOSIS — I69.320 APHASIA AS LATE EFFECT OF CEREBROVASCULAR ACCIDENT (CVA): ICD-10-CM

## 2024-06-25 NOTE — TELEPHONE ENCOUNTER
"I was able to reach Kathy - Irene Chavez to schedule an appointment with Pretty and to get the pharmacy name. Gave Kathy Pretty's advisements  \"Give regular strength aspirin and I will send in Plavix. He is to take both for at least 3 months.\"  He will need to follow up with vascular surgery.\" which she fully understood.   Virtual visit scheduled as well, on 6/27/2024 @2:00 PM).       Pt uses : [id number] [address] - [phone]  chart updated.       Notified Kathy Olsen placed another home health referrals.       Please review and advise       "

## 2024-06-25 NOTE — TELEPHONE ENCOUNTER
First attempt to contact patient to schedule an appointment for medication refills. The patient did not answer. A voicemail was left to contact the office.

## 2024-06-25 NOTE — TELEPHONE ENCOUNTER
Kathy, patients wife called asking if Severiano should be taking something for blood clots.  Since he left the hospital they have not given him a script and she is asking if he should be taking something.  She would like a call back letting her know.

## 2024-06-25 NOTE — TELEPHONE ENCOUNTER
[facility] from [facility] called requesting for office visit notes that discuss pt stroke issues and why he need home care please fax order for home care that specified services provider want to order once all is received they could make a determination fax # [phone]

## 2024-06-25 NOTE — TELEPHONE ENCOUNTER
I can do virtual appt. Give regular strength aspirin and I will send in Plavix. He is to take both for at least 3 months. He will need to follow up with vascular surgery. I will sen in another home health referral. I need pharmacy

## 2024-06-25 NOTE — TELEPHONE ENCOUNTER
Kathy, spouse of pt called in stating that they were not able to get the Home Health Care b/c they do not service [id number] area, only [id number].     Kathy wanted to know if it is possible for pt referral to be for an Outpatient facility? If possible, or if Pretty knew of another Home Health services that would be able to service their area?    Also, Kathy stated that the pt was not discharged, that he just got up and left the hospital. That he was upset. Kathy is currently giving pt baby aspirin and would Pretty be able to do a virtual visit with the pt? Stated she is concerned for pt and does not want him to get more blood clots.    Please advise & call Kathy with an update on her inquiries. Thank you!    Kathy Tompkins   [phone]

## 2024-06-27 ENCOUNTER — TELEMEDICINE (OUTPATIENT)
Dept: FAMILY MEDICINE CLINIC | Facility: CLINIC | Age: 54
End: 2024-06-27
Payer: COMMERCIAL

## 2024-06-27 DIAGNOSIS — E78.2 MIXED HYPERLIPIDEMIA: ICD-10-CM

## 2024-06-27 DIAGNOSIS — I63.50 CEREBROVASCULAR ACCIDENT (CVA) DUE TO STENOSIS OF CEREBRAL ARTERY (HCC): Primary | ICD-10-CM

## 2024-06-27 PROCEDURE — 99213 OFFICE O/P EST LOW 20 MIN: CPT | Performed by: PHYSICIAN ASSISTANT

## 2024-06-27 RX ORDER — CLOPIDOGREL BISULFATE 75 MG/1
75 TABLET ORAL DAILY
Qty: 90 TABLET | Refills: 3 | Status: SHIPPED | OUTPATIENT
Start: 2024-06-27

## 2024-06-27 RX ORDER — ROSUVASTATIN CALCIUM 5 MG/1
5 TABLET, COATED ORAL DAILY
Qty: 30 TABLET | Refills: 5 | Status: SHIPPED | OUTPATIENT
Start: 2024-06-27

## 2024-06-27 NOTE — PROGRESS NOTES
Virtual Regular Visit    Verification of patient location:    Patient is located at Home in the following state in which I hold an active license [place]      Assessment/Plan:    Problem List Items Addressed This Visit        Cardiovascular and Mediastinum    Cerebrovascular accident (CVA) due to stenosis of cerebral artery (HCC) - Primary    Relevant Medications    clopidogrel (PLAVIX) 75 mg tablet       Other    Hyperlipidemia    Relevant Medications    rosuvastatin (CRESTOR) 5 mg tablet              Reason for visit is   Chief Complaint   Patient presents with   • Follow-up   • Virtual Regular Visit        Encounter provider Selina Perez PA-C      Recent Visits  No visits were found meeting these conditions.  Showing recent visits within past 7 days and meeting all other requirements  Today's Visits  Date Type Provider Dept   06/27/24 Telemedicine Selina Perez PA-C  [facility] [address]   Showing today's visits and meeting all other requirements  Future Appointments  No visits were found meeting these conditions.  Showing future appointments within next 150 days and meeting all other requirements       The patient was identified by name and date of birth. Severiano Ball was informed that this is a telemedicine visit and that the visit is being conducted through the Epic Embedded platform. He agrees to proceed..  My office door was closed. No one else was in the room.  He acknowledged consent and understanding of privacy and security of the video platform. The patient has agreed to participate and understands they can discontinue the visit at any time.    Patient is aware this is a billable service.     Subjective  Sveeriano Ball is a 54 y.o. male for follow up of stroke .      Pt was recently in hospital in [place], [place] for stroke. He had a stent placed while there. He has some weakness on his right side but is able to feed himself and walk some. He is having speech difficulties.  His wife was present and answering most questions. Pt gave yes or no nods. He left AMA from the hospital and came home. He now is in need of rehab services. They have no records from the hospital.          Past Medical History:   Diagnosis Date   • Asthma    • Erectile dysfunction    • GERD (gastroesophageal reflux disease)    • Hyperlipidemia    • Kidney stone    • Psoriasis    • Squamous cell skin cancer 01/31/2023    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       Past Surgical History:   Procedure Laterality Date   • COLONOSCOPY     • HAND SURGERY Right    • HERNIA REPAIR Bilateral 11/17/2021    Procedure: REPAIR HERNIA INGUINAL, LAPAROSCOPIC BILATERAL WITH MESH, TRANS ABDOMINAL;  Surgeon: Melvin Marquez MD;  Location: [facility];  Service: General   • MOHS SURGERY  05/10/2023    Right cheek-Dr. Marie   • [place] ADJT TIS TRNS/REARGMT F/C/C/M/N/A/G/H/F 10SQCM/< Right 5/11/2023    Procedure: RECONSTRUCTION OF MOHS DEFECT RIGHT CHEEK WITH LOCAL FLAP;  Surgeon: Hola Tan MD;  Location: [facility];  Service: Plastics   • SKIN BIOPSY     • TONSILLECTOMY     • WISDOM TOOTH EXTRACTION      teeth extraction       Current Outpatient Medications   Medication Sig Dispense Refill   • clopidogrel (PLAVIX) 75 mg tablet Take 1 tablet (75 mg total) by mouth daily 90 tablet 3   • rosuvastatin (CRESTOR) 5 mg tablet Take 1 tablet (5 mg total) by mouth daily 30 tablet 5     No current facility-administered medications for this visit.        No Known Allergies    Review of Systems   Constitutional:  Negative for chills, diaphoresis and fever.   HENT:  Negative for trouble swallowing.    Respiratory:  Negative for shortness of breath.    Cardiovascular:  Negative for chest pain and palpitations.   Neurological:  Positive for speech difficulty and weakness. Negative for dizziness, tremors, seizures, syncope, facial asymmetry, light-headedness and headaches.       Video Exam    There were no vitals filed for this visit.    Physical  Exam  Vitals and nursing note reviewed.   HENT:      Head: Normocephalic.   Pulmonary:      Effort: Pulmonary effort is normal.   Neurological:      Mental Status: He is oriented to person, place, and time.      Motor: Weakness present.      Gait: Gait abnormal.      Comments: Difficulty with speech          Visit Time  Total Visit Duration: 14 minutes

## 2024-06-28 NOTE — TELEPHONE ENCOUNTER
At Providers request - okay to fax chart notes on 6/28/2024.       Failed Fax Notification -to # [phone]     Chart notes & Referral reprinted, faxed again on Side 2, awaiting confirmation sheet.       An attempted fax to the following recipients regarding a patient failed. Use the toolbar buttons to return to the encounter and retry the fax.     Sender    User: Dulce Clemente Department: [facility] [address]      Intended Recipient    Name: Susana from [facility] office Fax Number: [phone]      Fax Information    Regarding: Severiano Ball Creation Date: 6/28/2024  3:08 PM

## 2024-07-01 ENCOUNTER — TELEPHONE (OUTPATIENT)
Age: 54
End: 2024-07-01

## 2024-07-01 NOTE — TELEPHONE ENCOUNTER
Kathy called, ( spouse) to check on Referral for home care. She has been informed.  Will call back if unable to provide assistance for the patient.

## 2024-07-02 ENCOUNTER — TELEPHONE (OUTPATIENT)
Age: 54
End: 2024-07-02

## 2024-07-02 NOTE — TELEPHONE ENCOUNTER
Patients wife called regarding getting patient services through [facility].  [facility] needs us to send them demographics, an order for services, and a signature of the doctor you practice under.  All this information can be faxed to [phone].    The number that Kathy, patient spouse, is using to reach a nurse advocate for patient is [phone] if you should have any questions.

## 2024-07-03 ENCOUNTER — TELEPHONE (OUTPATIENT)
Dept: FAMILY MEDICINE CLINIC | Facility: CLINIC | Age: 54
End: 2024-07-03

## 2024-07-03 NOTE — TELEPHONE ENCOUNTER
Kathy, spouse of pt called back in stating that the faxes [facility] received are incomplete.     They need the pt demographics, office visit notes, orders for home care and that it needs to be signed by a Doctor of the office, no PA or CRNP.    Faxes to be sent to: [phone]     If more clarification or information is needed, Kathy stated that you can call: [phone], ask for Intake to speak with Neeru.     Please advise & update Kathy once faxes have been sent. Thank you!    Kathy Tompkins   [phone]

## 2024-07-03 NOTE — TELEPHONE ENCOUNTER
Signed by  requested ppwrk faxed to fax # [phone], with practice cover sheet, confirmation sheet received.   Copy attached this t/note.

## 2024-07-03 NOTE — TELEPHONE ENCOUNTER
Triamcinolone 0.5% Cream. It does not appear to be on his med list. A fax was received from the pharmacy.     Please advise, thank you!

## 2024-07-04 ENCOUNTER — HOSPITAL ENCOUNTER (EMERGENCY)
Facility: HOSPITAL | Age: 54
Discharge: HOME | End: 2024-07-04
Attending: EMERGENCY MEDICINE
Payer: COMMERCIAL

## 2024-07-04 VITALS
SYSTOLIC BLOOD PRESSURE: 107 MMHG | DIASTOLIC BLOOD PRESSURE: 59 MMHG | RESPIRATION RATE: 19 BRPM | HEART RATE: 59 BPM | TEMPERATURE: 97.8 F | OXYGEN SATURATION: 96 %

## 2024-07-04 DIAGNOSIS — L50.9 URTICARIA: Primary | ICD-10-CM

## 2024-07-04 LAB
2HR DELTA HS TROPONIN: 0 NG/L
ALBUMIN SERPL BCG-MCNC: 3.6 G/DL (ref 3.5–5)
ALP SERPL-CCNC: 78 U/L (ref 34–104)
ALT SERPL W P-5'-P-CCNC: 12 U/L (ref 7–52)
ANION GAP SERPL CALCULATED.3IONS-SCNC: 7 MMOL/L (ref 4–13)
AST SERPL W P-5'-P-CCNC: 16 U/L (ref 13–39)
BASOPHILS # BLD AUTO: 0.02 THOUSANDS/ÂΜL (ref 0–0.1)
BASOPHILS NFR BLD AUTO: 0 % (ref 0–1)
BILIRUB SERPL-MCNC: 0.8 MG/DL (ref 0.2–1)
BUN SERPL-MCNC: 18 MG/DL (ref 5–25)
CALCIUM SERPL-MCNC: 8.6 MG/DL (ref 8.4–10.2)
CARDIAC TROPONIN I PNL SERPL HS: 3 NG/L
CARDIAC TROPONIN I PNL SERPL HS: 3 NG/L
CHLORIDE SERPL-SCNC: 108 MMOL/L (ref 96–108)
CO2 SERPL-SCNC: 23 MMOL/L (ref 21–32)
CREAT SERPL-MCNC: 1.12 MG/DL (ref 0.6–1.3)
EOSINOPHIL # BLD AUTO: 0.07 THOUSAND/ÂΜL (ref 0–0.61)
EOSINOPHIL NFR BLD AUTO: 1 % (ref 0–6)
ERYTHROCYTE [DISTWIDTH] IN BLOOD BY AUTOMATED COUNT: 13.9 % (ref 11.6–15.1)
GFR SERPL CREATININE-BSD FRML MDRD: 73 ML/MIN/1.73SQ M
GLUCOSE SERPL-MCNC: 113 MG/DL (ref 65–140)
HCT VFR BLD AUTO: 43.8 % (ref 36.5–49.3)
HGB BLD-MCNC: 14.5 G/DL (ref 12–17)
IMM GRANULOCYTES # BLD AUTO: 0.04 THOUSAND/UL (ref 0–0.2)
IMM GRANULOCYTES NFR BLD AUTO: 0 % (ref 0–2)
LYMPHOCYTES # BLD AUTO: 1.82 THOUSANDS/ÂΜL (ref 0.6–4.47)
LYMPHOCYTES NFR BLD AUTO: 14 % (ref 14–44)
MAGNESIUM SERPL-MCNC: 1.8 MG/DL (ref 1.9–2.7)
MCH RBC QN AUTO: 31.4 PG (ref 26.8–34.3)
MCHC RBC AUTO-ENTMCNC: 33.1 G/DL (ref 31.4–37.4)
MCV RBC AUTO: 95 FL (ref 82–98)
MONOCYTES # BLD AUTO: 0.45 THOUSAND/ÂΜL (ref 0.17–1.22)
MONOCYTES NFR BLD AUTO: 4 % (ref 4–12)
NEUTROPHILS # BLD AUTO: 10.63 THOUSANDS/ÂΜL (ref 1.85–7.62)
NEUTS SEG NFR BLD AUTO: 81 % (ref 43–75)
NRBC BLD AUTO-RTO: 0 /100 WBCS
PLATELET # BLD AUTO: 285 THOUSANDS/UL (ref 149–390)
PMV BLD AUTO: 8.8 FL (ref 8.9–12.7)
POTASSIUM SERPL-SCNC: 4.7 MMOL/L (ref 3.5–5.3)
PROT SERPL-MCNC: 6.7 G/DL (ref 6.4–8.4)
RBC # BLD AUTO: 4.62 MILLION/UL (ref 3.88–5.62)
SODIUM SERPL-SCNC: 138 MMOL/L (ref 135–147)
WBC # BLD AUTO: 13.03 THOUSAND/UL (ref 4.31–10.16)

## 2024-07-04 PROCEDURE — 99283 EMERGENCY DEPT VISIT LOW MDM: CPT

## 2024-07-04 PROCEDURE — 99285 EMERGENCY DEPT VISIT HI MDM: CPT | Performed by: EMERGENCY MEDICINE

## 2024-07-04 PROCEDURE — 84484 ASSAY OF TROPONIN QUANT: CPT | Performed by: EMERGENCY MEDICINE

## 2024-07-04 PROCEDURE — 36415 COLL VENOUS BLD VENIPUNCTURE: CPT | Performed by: EMERGENCY MEDICINE

## 2024-07-04 PROCEDURE — 83735 ASSAY OF MAGNESIUM: CPT | Performed by: EMERGENCY MEDICINE

## 2024-07-04 PROCEDURE — 96374 THER/PROPH/DIAG INJ IV PUSH: CPT

## 2024-07-04 PROCEDURE — 96361 HYDRATE IV INFUSION ADD-ON: CPT

## 2024-07-04 PROCEDURE — 96375 TX/PRO/DX INJ NEW DRUG ADDON: CPT

## 2024-07-04 PROCEDURE — 80053 COMPREHEN METABOLIC PANEL: CPT | Performed by: EMERGENCY MEDICINE

## 2024-07-04 PROCEDURE — 93005 ELECTROCARDIOGRAM TRACING: CPT

## 2024-07-04 PROCEDURE — 85025 COMPLETE CBC W/AUTO DIFF WBC: CPT | Performed by: EMERGENCY MEDICINE

## 2024-07-04 RX ORDER — METHYLPREDNISOLONE SODIUM SUCCINATE 125 MG/2ML
125 INJECTION, POWDER, LYOPHILIZED, FOR SOLUTION INTRAMUSCULAR; INTRAVENOUS ONCE
Status: COMPLETED | OUTPATIENT
Start: 2024-07-04 | End: 2024-07-04

## 2024-07-04 RX ORDER — DIPHENHYDRAMINE HYDROCHLORIDE 50 MG/ML
50 INJECTION INTRAMUSCULAR; INTRAVENOUS ONCE
Status: COMPLETED | OUTPATIENT
Start: 2024-07-04 | End: 2024-07-04

## 2024-07-04 RX ORDER — PREDNISONE 50 MG/1
50 TABLET ORAL DAILY
Qty: 5 TABLET | Refills: 0 | Status: SHIPPED | OUTPATIENT
Start: 2024-07-04 | End: 2024-07-09

## 2024-07-04 RX ORDER — DIPHENHYDRAMINE HCL 25 MG
50 TABLET ORAL ONCE
Status: DISCONTINUED | OUTPATIENT
Start: 2024-07-04 | End: 2024-07-04

## 2024-07-04 RX ADMIN — SODIUM CHLORIDE 1000 ML: 0.9 INJECTION, SOLUTION INTRAVENOUS at 11:53

## 2024-07-04 RX ADMIN — DIPHENHYDRAMINE HYDROCHLORIDE 50 MG: 50 INJECTION, SOLUTION INTRAMUSCULAR; INTRAVENOUS at 11:53

## 2024-07-04 RX ADMIN — METHYLPREDNISOLONE SODIUM SUCCINATE 125 MG: 125 INJECTION, POWDER, FOR SOLUTION INTRAMUSCULAR; INTRAVENOUS at 11:53

## 2024-07-04 NOTE — ED PROVIDER NOTES
History  Chief Complaint   Patient presents with   • Allergic Reaction     Pt started 2 new medications 2 days ago, unsure of they are in triage. C/o generalized body rash that started yesterday. Recently had brain stent placed and is not speaking much in triage.     Patient is a 54-year-old male past medical history of hypertension, hyperlipidemia, recent CVA with stent, kidney stone, asthma presenting for rash.  Wife at bedside states that 2 days ago 2 new medications were started by PCP and since then patient has broke out in total body rash.  Denies any shortness of breath that she has noticed, denies any nausea or vomiting and states otherwise he is acting normally but has baseline diminished speech secondary to recent stroke.  Denies any fevers but does note diarrhea.        Prior to Admission Medications   Prescriptions Last Dose Informant Patient Reported? Taking?   clopidogrel (PLAVIX) 75 mg tablet   No No   Sig: Take 1 tablet (75 mg total) by mouth daily   rosuvastatin (CRESTOR) 5 mg tablet   No No   Sig: Take 1 tablet (5 mg total) by mouth daily      Facility-Administered Medications: None       Past Medical History:   Diagnosis Date   • Asthma    • Erectile dysfunction    • GERD (gastroesophageal reflux disease)    • Hyperlipidemia    • Kidney stone    • Psoriasis    • Squamous cell skin cancer 01/31/2023    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       Past Surgical History:   Procedure Laterality Date   • COLONOSCOPY     • HAND SURGERY Right    • HERNIA REPAIR Bilateral 11/17/2021    Procedure: REPAIR HERNIA INGUINAL, LAPAROSCOPIC BILATERAL WITH MESH, TRANS ABDOMINAL;  Surgeon: Melvin Marquez MD;  Location: [facility];  Service: General   • MOHS SURGERY  05/10/2023    Right cheek-Dr. Marie   • [place] ADJT TIS TRNS/REARGMT F/C/C/M/N/A/G/H/F 10SQCM/< Right 5/11/2023    Procedure: RECONSTRUCTION OF MOHS DEFECT RIGHT CHEEK WITH LOCAL FLAP;  Surgeon: Hola Tan MD;  Location: [facility];  Service: Plastics   •  SKIN BIOPSY     • TONSILLECTOMY     • WISDOM TOOTH EXTRACTION      teeth extraction       Family History   Problem Relation Age of Onset   • Diabetes Mother    • Prostate cancer Father      I have reviewed and agree with the history as documented.    E-Cigarette/Vaping   • E-Cigarette Use Former User    • Quit Date 6/9/1995    • Comments quit 2016      E-Cigarette/Vaping Substances     Social History     Tobacco Use   • Smoking status: Every Day     Current packs/day: 1.50     Average packs/day: 1.5 packs/day for 35.0 years (52.5 ttl pk-yrs)     Types: Cigarettes   • Smokeless tobacco: Former   Vaping Use   • Vaping status: Former   • Quit date: 6/9/1995   Substance Use Topics   • Alcohol use: Not Currently   • Drug use: Not Currently     Frequency: 7.0 times per week     Types: Marijuana     Comment: Medical Marijuana       Review of Systems   All other systems reviewed and are negative.      Physical Exam  Physical Exam  Vitals reviewed.   Constitutional:       General: He is not in acute distress.     Appearance: Normal appearance. He is not ill-appearing.   HENT:      Mouth/Throat:      Mouth: Mucous membranes are moist.   Eyes:      Conjunctiva/sclera: Conjunctivae normal.   Cardiovascular:      Rate and Rhythm: Normal rate and regular rhythm.      Pulses: Normal pulses.      Heart sounds: Normal heart sounds.   Pulmonary:      Effort: Pulmonary effort is normal. No respiratory distress.      Breath sounds: Normal breath sounds. No stridor. No wheezing.   Abdominal:      General: Abdomen is flat.      Palpations: Abdomen is soft.      Tenderness: There is no abdominal tenderness.   Musculoskeletal:         General: No swelling. Normal range of motion.      Cervical back: Neck supple.      Right lower leg: No edema.      Left lower leg: No edema.   Skin:     General: Skin is warm and dry.      Comments: Diffuse urticarial rash   Neurological:      General: No focal deficit present.      Mental Status: He is  alert.   Psychiatric:         Mood and Affect: Mood normal.         Vital Signs  ED Triage Vitals [07/04/24 1139]   Temperature Pulse Respirations Blood Pressure SpO2   97.8 °F (36.6 °C) 75 16 (!) 88/53 96 %      Temp Source Heart Rate Source Patient Position - Orthostatic VS BP Location FiO2 (%)   Temporal Monitor -- Left arm --      Pain Score       --           Vitals:    07/04/24 1139 07/04/24 1145   BP: (!) 88/53 108/69   Pulse: 75          Visual Acuity      ED Medications  Medications   sodium chloride 0.9 % bolus 1,000 mL (has no administration in time range)   methylPREDNISolone sodium succinate (Solu-MEDROL) injection 125 mg (has no administration in time range)   diphenhydrAMINE (BENADRYL) tablet 50 mg (has no administration in time range)       Diagnostic Studies  Results Reviewed       None                   No orders to display              Procedures  ECG 12 Lead Documentation Only    Date/Time: 7/4/2024 12:05 PM    Performed by: Sada Agrawal DO  Authorized by: Sada Agrawal DO    Patient location:  ED  Previous ECG:     Previous ECG:  Unavailable  Interpretation:     Interpretation: normal    Rate:     ECG rate assessment: normal    Rhythm:     Rhythm: sinus rhythm    Ectopy:     Ectopy: none    QRS:     QRS axis:  Normal    QRS intervals:  Normal  Conduction:     Conduction: normal    ST segments:     ST segments:  Normal  T waves:     T waves: normal             ED Course  ED Course as of 07/04/24 1449   Thu Jul 04, 2024   1407 Workup unremarkable, discussed return precautions and outpatient follow-up which family state they understand.                                             Medical Decision Making  Patient is a 54-year-old male past medical history of hyperlipidemia, CVA, kidney stone, asthma, GERD presenting for allergic reaction.  Patient is well-appearing at bedside with stable vitals and in no acute distress.  He is initially in triage hypotensive to 88/53 however when  brought back to exam room he has pressure 108/69 with no intervention and does not appear to be in any distress, no signs of respiratory distress like retractions, accessory muscle use, no stridor or wheezing, soft nontender abdomen, no apparent oropharyngeal edema no other significant physical exam findings other than diffuse urticarial rash.  At this time as patient had transient hypotension which improved without intervention and has no other systemic signs of anaphylaxis will give steroids and Benadryl however will hold epinephrine at this time and continue to monitor and obtain labs to assess for MILES, electrolyte abnormalities, anemia, EKG to assess for ACS or arrhythmia causing hypotension.  If unremarkable will discharge with outpatient follow-up if blood pressures remain stable.    Amount and/or Complexity of Data Reviewed  Labs: ordered.    Risk  OTC drugs.  Prescription drug management.             Disposition  Final diagnoses:   None     ED Disposition       None          Follow-up Information    None         Patient's Medications   Discharge Prescriptions    No medications on file       No discharge procedures on file.    PDMP Review       None            ED Provider  Electronically Signed by             Sada Agrawal DO  07/04/24 4901

## 2024-07-05 DIAGNOSIS — L40.9 PSORIASIS: Primary | ICD-10-CM

## 2024-07-05 LAB
ATRIAL RATE: 63 BPM
P AXIS: 72 DEGREES
PR INTERVAL: 140 MS
QRS AXIS: 86 DEGREES
QRSD INTERVAL: 88 MS
QT INTERVAL: 414 MS
QTC INTERVAL: 423 MS
T WAVE AXIS: 45 DEGREES
VENTRICULAR RATE: 63 BPM

## 2024-07-05 PROCEDURE — 93010 ELECTROCARDIOGRAM REPORT: CPT | Performed by: INTERNAL MEDICINE

## 2024-07-05 RX ORDER — TRIAMCINOLONE ACETONIDE 5 MG/G
CREAM TOPICAL 3 TIMES DAILY
Qty: 45 G | Refills: 2 | Status: SHIPPED | OUTPATIENT
Start: 2024-07-05

## 2024-07-08 ENCOUNTER — TELEPHONE (OUTPATIENT)
Age: 54
End: 2024-07-08

## 2024-07-08 DIAGNOSIS — I63.50 CEREBROVASCULAR ACCIDENT (CVA) DUE TO STENOSIS OF CEREBRAL ARTERY (HCC): Primary | ICD-10-CM

## 2024-07-08 DIAGNOSIS — I69.320 APHASIA AS LATE EFFECT OF CEREBROVASCULAR ACCIDENT (CVA): ICD-10-CM

## 2024-07-08 NOTE — TELEPHONE ENCOUNTER
The homehealth nurse did evaluate and feels that the patient needs outpatient pt, speech, etc if a referral can be placed and let her know.... also wife states that patient will not take his plavix , she was wondering if something similar could be given in a liquid form

## 2024-07-09 NOTE — PROGRESS NOTES
"Speech-Language Pathology Initial Evaluation    Today's date: 7/10/2024   Patient’s name: Severiano Ball  : 1970  MRN: [id number]  Safety measures:   Referring provider: Selina Perez,*    Encounter Diagnosis     ICD-10-CM    1. Aphasia as late effect of cerebrovascular accident (CVA)  I69.320 Ambulatory Referral to Speech Therapy      2. Cerebrovascular accident (CVA) due to stenosis of cerebral artery (HCC)  I63.50 Ambulatory Referral to Speech Therapy          Assessment:  -Patient presents with severe global aphasia c/b or secondary to acute CVA. Verbal expression deficits can c/b difficulty with word retrieval (verbal fluency, confrontational naming, and informal/formal conversation), production of grammatical short descriptions, and slurred speech. Verbal expression is limited to 1-2 words. Word confrontation is poor. Patient frequently verbalized incorrect answers. Patient frequently shakes his head and states \"mhm\" unreliably. Suspects this is due to significant auditory comprehension deficits as well. Patient is uncertain of verbal deficits. Verbal repetition is impaired at the sound, word, sentence and conversational level. Auditory comprehension deficits can be c/b inconsistent difficulty with  following 1 step commands and answering yes/no questions. Reading and graphic expression was not formally assessed during today's session due to time constraints, however, suspected to be impaired due to low scoring of the Auditory Word Recognition section of the Western Aphasia Battery. Goals in POC will target these suspected deficits as well. Overall, patient scored in the Aphasia quotient a 9.5/100 which is a significant expressive/receptive language impairment.  Patient would benefit from outpatient skilled Speech Therapy services to maximize expressive/receptive language skills after acute VCA. Will initate 3x week for 45 mins sessions to maximize acute healing phase of recovery. OP ST " services will target to support the highest level of independence with current speech-language skills, promote positive communication interactions with both familiar & unfamiliar listeners, further education/training compensatory strategies, increase communication of wants/needs, expressive/receptive language within functional activities, participate in meaningful activities, allow for increased socialization, promote safety, facilitate overall improved quality of life, reduce caregiver burden and complete caregiver education/training. Patient is a good candidate for OP ST services.      Short Term    LISTENING  Patient will follow one-step verbal directions (e.g., close your eyes) with 60% accuracy to facilitate increased auditory comprehension skills,     Patient will answer verbal Yes/No questions with 60% accuracy to facilitate increased auditory comprehension skills,     READING  Patient will facilitate functional reading skills by matching word to picture with 60% accuracy over 5 sessions to facilitate growth of vocabulary concept comprehension,    Patient will facilitate functional reading skills by matching word to phrase/function with 60% accuracy over 5 sessions to facilitate growth of vocabulary concept comprehension,     Patient will facilitate functional reading skills by following one-step written directions (e.g., close your eyes) with 60% accuracy to facilitate increased reading comprehension skills,     Patient will facilitate functional reading skills by naming opposites (e.g., hot and ___) with choice of 3 with 60% accuracy to build expressive vocabulary for conversation,     Patient will facilitate functional reading skills by naming synonyms (e.g., street or ___) with choice of 3 with 60% accuracy to build expressive vocabulary for conversation,    Patient will facilitate functional reading skills by answering written Yes/No questions with 60% accuracy to facilitate increased reading  comprehension skills,     Patient will facilitate functional reading skills by providing an appropriate word for sentence completion task (e.g., open the ___) with 60% accuracy to facilitate increased expressive language skills,    WRITING  Patient will complete simple writing tasks at the word level with >60% acc to increase writing skills within FLE,  Patient will improve functional writing skills   SPEAKING  Patient will complete naming of automatic tasks (e.g., counting, listing days of the week/months of the year, etc.) with 60% accuracy to facilitate increased verbal expression skills,   Patient will imitate words/phrases using MDAISON/tapping/pacing with 60% accuracy to facilitate increased verbal expression skills,     Patient will name opposite (e.g., hot and ___) with 60% accuracy to build expressive vocabulary for conversation,   Patient will name a synonym (e.g., street or ___) with 60% accuracy to build expressive vocabulary for conversation,     Patient will name appropriate category for three words (e.g., apple, banana, pear = fruits) with 60% accuracy to facilitate improved generative naming skills,    Patient will provide an appropriate word for sentence completion task (e.g., open the ___) with 60% accuracy to facilitate increased expressive language skills,    Patient will provide an adequate response to generative naming task (i.e., name as many…) with 60% accuracy to facilitate increased expressive language skills,     Patient will provide an adequate response to confrontation naming task (i.e., what is…) with 60% accuracy to facilitate increased expressive language skills,     Patient will name up to 5 features to describe a person/place/thing with 60% accuracy to facilitate improved utilization of circumlocution strategy,     Patient will complete picture description tasks using language organization strategies with 60% accuracy to facilitate improved utilization of circumlocution strategy,      Long Term    Patient will demonstrate improved expressive and receptive language skills during structured and unstructured tasks by discharge.      Patient will improve ability to facilitate communication to meet needs including use of compensatory strategies to promote meaningful interactions for improved quality of life and maximize level of independence.         Plan:  Patient would benefit from outpatient skilled Speech Therapy services: Speech-language therapy    Frequency: 3x weekly  Duration: 3 months    Intervention certification from: 7/10/2024  Intervention certification to: 10/10/2024      Subjective:  History of present illness: Patient is a 54 y.o. male who was referred to outpatient skilled Speech Therapy services for a aphasia evaluation. PMH: hypertension, hyperlipidemia, recent CVA with stent, kidney stone, asthma. Patient unable to independently communicate wants due to global aphasia and is an unreliable historian.  At the end of the session, spouse reported patient had CVA secondary to stenosis of cerebral artery and presented to  [facility]. Kathy states, He has a stent put in his brain. Suspected discharged AMA 6/24 home (patient was upset and walked out of hospital). Spouse reports significant improvement in speech and gait these past two weeks. She reports he makes sentences on the phone with friends.They do not have any records from the hospital.  Spouse denies difficulties with swallowing (no dentition). Chief complaint: expressive/receptive language       Patient's goal(s): to return to PLOF    Hearing:  unable to communicate yes/no response  Vision: [facility] for testing    Home environment/lifestyle: lives with wife  Highest level of education:  unable to communicate  Vocational status: unable to commmunicate      Objective (testing):  The Western Aphasia Battery-Revised (WAB-R) is designed to evaluate a patient's language function following CVA, dementia, or  other acquired neurological disorder. It measures a patient’s linguistic skills, such as speech content, fluency, auditory comprehension, repetition, naming, reading, and writing. The WAB-R also measures a patient’s nonlinguistic skills, including drawing, calculation, block design, and apraxia. The purpose of this standardized assessment is to (1) determine the presence, severity, and type of aphasia; (2) measure the patient's level of performance to provide a baseline for detecting change over time; (3) provide a comprehensive assessment of the patient's language assets and deficits in order to guide treatment and management; and (4) infer the location and etiology of the lesion causing aphasia. The following results were obtained during the administration of the assessment:     PART 1: Score:   -Spontaneous Speech: 3/20   -Auditory Verbal Comprehension: 1.15/10   -Repetition: 0.5/10   -Naming & Word Findin.1/10     *Pt scores correlated most consistently with Global Aphasia.     Score:   *Aphasia Quotient (AQ): 9.5/100   *Language Quotient (LQ): /100   *Cortical Quotient (CQ): /100     Due to time constraints did not complete writing and reading sections of WAB.    Informal evaluation of motor speech: Noted instances of errors of motor production of expressive language      Visit Tracking:  POC expires Unit limit Auth Expiration date PT/OT + Visit Limit?   10/10/24                              Visit/Unit Tracking  AUTH Status:  Date 7/10  IE              yes Used 1               Remaining                          Intervention Comments:  45 mins of aphasia testing, 55 mins of write-up, scoring,

## 2024-07-10 ENCOUNTER — EVALUATION (OUTPATIENT)
Age: 54
End: 2024-07-10
Payer: COMMERCIAL

## 2024-07-10 DIAGNOSIS — I69.320 APHASIA AS LATE EFFECT OF CEREBROVASCULAR ACCIDENT (CVA): Primary | ICD-10-CM

## 2024-07-10 DIAGNOSIS — I63.50 CEREBROVASCULAR ACCIDENT (CVA) DUE TO STENOSIS OF CEREBRAL ARTERY (HCC): Primary | ICD-10-CM

## 2024-07-10 DIAGNOSIS — I63.50 CEREBROVASCULAR ACCIDENT (CVA) DUE TO STENOSIS OF CEREBRAL ARTERY (HCC): ICD-10-CM

## 2024-07-10 PROCEDURE — 97530 THERAPEUTIC ACTIVITIES: CPT

## 2024-07-10 PROCEDURE — 96105 ASSESSMENT OF APHASIA: CPT

## 2024-07-10 PROCEDURE — 97162 PT EVAL MOD COMPLEX 30 MIN: CPT

## 2024-07-10 NOTE — PROGRESS NOTES
"                                                                   PT Evaluation        POC expires Unit limit Auth Expiration date PT/OT + Visit Limit? Co-Insurance   10/10/24                                   Visit/Unit Tracking  AUTH Status:  Date 7/10               Used                Remaining                         Today's date: 7/10/2024  Patient name: Severiano Ball  : 1970  MRN: [id number]  Referring provider: Selina Perez,*  Dx:   Encounter Diagnosis     ICD-10-CM    1. Cerebrovascular accident (CVA) due to stenosis of cerebral artery (HCC)  I63.50 Ambulatory Referral to Physical Therapy            Assessment  Assessment details: Patient is a 54 y.o. Male who presents to skilled outpatient PT after recent CVA. Kathy present throughout session. Upon initial evaluation, pt severely limited by global aphasia. Patient presents with globally 3+/5 or greater MMT in LE, noted inc fatigue and diminished heel strike on R side with gait assessment. Able to complete minimal testing with hand over hand cueing, but improved throughout. Balance impaired with EC, SLS on R side, as well as inability to inc speed/intensity of gait, with suspected FOF. Patient will benefit from skilled OP PT to maximize funcitonal mobility after acute CVA. NV trial SOLO step, and autonomic external cueing to improve participation (kicking a ball, balancing bosu, etc). Significant amount of time spent completing patient education on POC, prognosis, HEP, and benefits to an occupational therapy evaluation. Patient and wife agreeable. Will initiate PT 2-3x/week to maximize acute healing phase of recovery. Of note, at end of IE, patient able to communicate \"see you later\" and \"thank you.\" Patient will be a good candidate for PT      Impairments: Abnormal coordination, Abnormal gait, Abnormal muscle tone, Abnormal or restricted ROM, Activity intolerance, Impaired balance, Impaired physical strength, Lacks appropriate HEP, Poor " posture, Poor body mechanics, Pain with function, Safety issue, Weight-bearing intolerance, Abnormal movement, Difficulty understanding, Abnormal muscle firing  Understanding of Dx/Px/POC: Good  Prognosis: Good      Patient verbalized understanding of POC.         Please contact me if you have any questions or recommendations. Thank you for the referral and the opportunity to share in Severiano Ball's care.        Plan  Plan details: will see patient 1-3x/week  Patient would benefit from: PT Eval, Skilled PT, OT Eval, and Speech Eval  Planned modality interventions: Biofeedback, Cryotherapy, TENS, Thermotherapy  Planned therapy interventions: Abdominal trunk stabilization, ADL training, Balance, Balance/WB training, Breathing training, Body mechanics training, Coordination, Functional ROM exercises, Gait training, HEP, Joint Mobilization, Manual Therapy, Fine taping, Motor coordination training, Neuromuscular re-education, Patient education, Postural training, Strengthening, Stretching, Therapeutic activities, Therapeutic exercises, Therapeutic training, Transfer training, Activity modification, Work reintegration  Frequency: 1-3x/wk  Duration in weeks: 12  Plan of Care beginning date: 7/10/24  Plan of Care expiration date: 3 months - 10/10/2024  Treatment plan discussed with: Patient and Family         Goals  Short Term Goals (4 weeks):    - Patient will improve scoring on FGA by 4 points to progress safety with dynamic tasks  - Patient will be able to complete additional outcome measures including 5xSTS and TUG  - Patient will follow 1 step commands 50% of the time  - Patient will be independent in basic HEP 2-3 weeks    Long Term Goals (12 weeks):  - Patient will be independent in a comprehensive home exercise program  - Patient will improve 5xSTS score by 2.3 seconds to promote improved LE functional strength needed for ADLs  - Patient will improve gait speed by 0.18 m/s to improve safety with community  ambulation  - Patient will be able to demonstrate HT in gait without veering  - Patient will improve 6 Minute Walk Test score by 190 feet to promote improved cardiovascular endurance  - Patient will report 50% reduction in near falls in order to improve safety with functional tasks and reduce his risk for falls  - Patient will report going on walks at least 3 days per week to promote independence and improved cardiovascular endurance  - Patient will be able to ascend/descend stairs reciprocally without UE assist to promote independence and safety with ADLs  - Patient will report 50% reduction in near falls when ambulating on uneven terrain  - Patient will improve time on TUG by 2.9 seconds to facilitate improved safety in all ambulation      Cut off score  All date taken from APTA Neuro Section or Rehab Measures      Cope:  Shorty et al., 2018  MDC: 2.7 pts    Vitor et al., 2011  Cut-off score: 45/56    Chronic CVA  < 44/56 high risk for falls (Shorty et al., 2018)  < 47.5/56 slow walker status (Lanette et al., 2011) 5xSTS: Tae 2010  MDC: 2.3 sec  Age Norms:  60-69: 11.1 sec  70-79: 12.6 sec  80-89: 14.8 sec    Eliud 2010, Chronic Stroke  Chronic CVA: 12 sec   TUG  Keeley pleitez al., 2005  MDC: 2.9 sec    Cut off score:  >13.5 sec community dwelling adults  >32.2 frail elderly  <20 I for basic transfers  >30 dependent on transfers 10 Meter Walk Test:   Abelino et al., 2006  Small meaningful change: 0.06 m/s  Substantial meaningful change: 0.14 m/s  MCID: 0.16 m/s    < 0.4 m/s household ambulators  0.4 - 0.8 m/s limited community ambulators  > 0.8 m/s community ambulators   FGA: Melody et al., 2010  MCID: 4.2 pts  Geriatrics/community < 22/30 fall risk  Geriatrics/community < 20/30 unexplained falls DGI  MDC: vestibular - 4 pts  MDC: geriatric/community - 3 pts  Falls risk <19/24   6 Minute Walk Test  Abelino et al., 2006  MDC: 60.98 m (200.01 ft)    Diane Bradley, & Luz, 2012  MCID: 34.4 m    Age Norms  60-69: M  - 1876 ft   F - 1765 ft  70-79: M - 1729 ft   F - 1545 ft  80-89: M - 1368 ft   F - 1286 ft Modified Bridget  0: No increase in tone  1: Catch and release or min resistance at end range  1+: Catch f/b min resistance throughout remainder (< half ROM)  2: Easily moved, but more marked tone throughout most ROM  3: Significant tone, PROM difficult  4: Rigid   MiniBest: Rochelle et al., 2013  CVA < 17.5 fall risk Pass (Acute CVA)  MDC: 1.8 points (acute), 3.2 points (chronic)         Subjective  History of Present Illness  TARA: Chart review completed. Patient has global aphasia and is an unreliable historian. Kathy present throughout PT evaluation. Patient had a stroke [facility]. Kathy states, He has a stent put in his brain. Suspected discharged AMA 6/24 home (patient was upset and walked out of hospital). Kathy reports significant improvement in speech and gait these past two weeks. She reports he makes sentences on the phone with friends. Is getting strength back in R hand as wife notes he is not dropping the phone often. They do not have any records from the hospital.     Primary AD: none  Assist level at home: independent  WC usage: no  PLOF: indpeendent    Patient goal: to return to PLOF    Pain  Current pain rating: unable to report     Social Support  Stairs in house: FF HR     Employment status:  - and [employer]  Hand dominance: R    Treatments  Previous treatment: none  Current treatment: SLP    Objective  Vitals  - HR: 57bpm  - BP: 132/80mmHg  - SPO2: 98%    LE MMT- difficulty to assess- 3+ B/L- noted incomplete movement of R LE/ fatigued easily with multiple repetitions    Sensation  - Light touch: unable to adequately assess- Pain: intact      Coordination  - Dysmetria: not tested  - Alternating Toe Taps: WFL (mimicking therapist)    Modified Bridget  - R knee extension: 0 - no increase in tone  - L knee extension: 0 - no increase in tone  - R knee flexion: 0 -  no increase in tone   - L knee flexion: 0 - no increase in tone  - R ankle DF: 0 - no increase in tone   - L ankle DF: 0 - no increase in tone    Clonus  - L: No  - R: No    Vision  - Glasses: Yes  - Abnormalities prior to CVA: Yes, glasses-for distance   - Abnormalities post CVA: unable to assess, Kathy reports patient is not wearing his glasses recently    Neglect  - R sided: No  - L sided: No    Pusher's Syndrome  - R sided: No  - L sided: No        Outcome Measures Initial Eval  7/10        5xSTS NT sec        TUG NT sec        10 meter 1.22 m/s        FIELDS NT/56        FGA 18/30 difficult to assess due to global aphasia patient scored 0 on items unable to complete        6MWT 1,200 ft no AD                               Precautions: CVA  Past Medical History:   Diagnosis Date    Asthma     Erectile dysfunction     GERD (gastroesophageal reflux disease)     Hyperlipidemia     Kidney stone     Psoriasis     Squamous cell skin cancer 01/31/2023    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

## 2024-07-11 ENCOUNTER — OFFICE VISIT (OUTPATIENT)
Age: 54
End: 2024-07-11
Payer: COMMERCIAL

## 2024-07-11 DIAGNOSIS — I69.320 APHASIA AS LATE EFFECT OF CEREBROVASCULAR ACCIDENT (CVA): Primary | ICD-10-CM

## 2024-07-11 DIAGNOSIS — I63.50 CEREBROVASCULAR ACCIDENT (CVA) DUE TO STENOSIS OF CEREBRAL ARTERY (HCC): Primary | ICD-10-CM

## 2024-07-11 DIAGNOSIS — I63.50 CEREBROVASCULAR ACCIDENT (CVA) DUE TO STENOSIS OF CEREBRAL ARTERY (HCC): ICD-10-CM

## 2024-07-11 PROCEDURE — 97530 THERAPEUTIC ACTIVITIES: CPT

## 2024-07-11 PROCEDURE — 97112 NEUROMUSCULAR REEDUCATION: CPT

## 2024-07-11 PROCEDURE — 92507 TX SP LANG VOICE COMM INDIV: CPT

## 2024-07-11 NOTE — PROGRESS NOTES
"Daily Speech Treatment Note    Today's date: 2024   Patient’s name: Severiano Ball  : 1970  MRN: [id number]  Safety measures: recent CVA w/stent  Referring provider: Selina Perez,*    Encounter Diagnosis     ICD-10-CM    1. 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 Cerebrovascular accident (CVA) due to stenosis of cerebral artery (HCC)  I63.50           Visit Tracking:  POC expires Unit limit Auth Expiration date PT/OT + Visit Limit?   10/10/24                                                 Visit/Unit Tracking  AUTH Status:  Date 7/10  IE                         yes Used 1  2                         Remaining                                Subjective/Behavioral:  -Patient pleasant, engaged, and cooperative. Patient's wife in attendance for today's session. No new concerns reported.     Objective/Assessment:  -Patient's family member/caregiver was present during today's session.  -Reviewed testing results and goals in plan care with patient. Patient is in agreement at this time. Patient and spouse educated on aphasia, basic neuro A&P with respect to language and cognition. Expressed to patient's spouse difficulty in  out cognitive dysfunction from global aphasia impairment. Patient demonstrated variability in receptive language skills throughout session. Patient observed executing minimal oral movements when attempting to verbalize. Several semantic paraphasias noted when patient produced intelligible speech. Patient's spouse reported patient is \"speaking sentences with certain people (friends)\" (e.g. \"Yeah, I'm fine.\").     Short-term goals:   LISTENING  Patient will follow one-step verbal directions (e.g., close your eyes) with 60% accuracy to facilitate increased auditory comprehension skills.  24: Probed patient's ability to receptively identify common items through one-step (\"point to\") commands. Initiated task with F.O. 4 - patient " unable to complete with max cues, including hand-over-hand assist. Reduced field to 2 pictures. Patient identified common items from F.O. 2 with 25% accuracy, IND. Accuracy increased to 100% given max cues including immediate models, direct verbal instruction, and hand-over-hand assist.      Patient will answer verbal Yes/No questions with 60% accuracy to facilitate increased auditory comprehension skills.  7/11/24: Patient answered basic Y/N questions with 70% accuracy given max cues including visuo-gestural, semantic cues, and models.      READING  Patient will facilitate functional reading skills by matching word to picture with 60% accuracy over 5 sessions to facilitate growth of vocabulary concept comprehension, to be achieved in 4-6 weeks.     Patient will facilitate functional reading skills by matching word to phrase/function with 60% accuracy over 5 sessions to facilitate growth of vocabulary concept comprehension, to be achieved in 4-6 weeks.     Patient will facilitate functional reading skills by following one-step written directions (e.g., close your eyes) with 60% accuracy to facilitate increased reading comprehension skills, to be achieved in 4-6 weeks.     Patient will facilitate functional reading skills by naming opposites (e.g., hot and ___) with choice of 3 with 60% accuracy to build expressive vocabulary for conversation, to be achieved in 4-6 weeks.     Patient will facilitate functional reading skills by naming synonyms (e.g., street or ___) with choice of 3 with 60% accuracy to build expressive vocabulary for conversation, to be achieved in 4-6 weeks.     Patient will facilitate functional reading skills by answering written Yes/No questions with 60% accuracy to facilitate increased reading comprehension skills, to be achieved in 4-6 weeks.     Patient will facilitate functional reading skills by providing an appropriate word for sentence completion task (e.g., open the ___) with 60% accuracy  to facilitate increased expressive language skills, to be achieved in 4-6 weeks.     WRITING  Patient will complete simple writing tasks at the word level with >60% acc to increase writing skills within FLE, to be achieved in 4-6 weeks.    Patient will improve functional writing skills     SPEAKING  Patient will complete naming of automatic tasks (e.g., counting, listing days of the week/months of the year, etc.) with 60% accuracy to facilitate increased verbal expression skills, to be achieved in 4-6 weeks.     Patient will imitate words/phrases using MADISON/tapping/pacing with 60% accuracy to facilitate increased verbal expression skills, to be achieved in 4-6 weeks.     Patient will name opposite (e.g., hot and ___) with 60% accuracy to build expressive vocabulary for conversation, to be achieved in 4-6 weeks.     Patient will name a synonym (e.g., street or ___) with 60% accuracy to build expressive vocabulary for conversation, to be achieved in 4-6 weeks.     Patient will name appropriate category for three words (e.g., apple, banana, pear = fruits) with 60% accuracy to facilitate improved generative naming skills, to be achieved in 4-6 weeks.     Patient will provide an appropriate word for sentence completion task (e.g., open the ___) with 60% accuracy to facilitate increased expressive language skills, to be achieved in 4-6 weeks.     Patient will provide an adequate response to generative naming task (i.e., name as many…) with 60% accuracy to facilitate increased expressive language skills, to be achieved in 4-6 weeks.     Patient will provide an adequate response to confrontation naming task (i.e., what is…) with 60% accuracy to facilitate increased expressive language skills, to be achieved in 4-6 weeks.     Patient will name up to 5 features to describe a person/place/thing with 60% accuracy to facilitate improved utilization of circumlocution strategy, to be achieved in 4-6 weeks.     Patient will complete  picture description tasks using language organization strategies with 60% accuracy to facilitate improved utilization of circumlocution strategy, to be achieved in 4-6 weeks.    Plan:  -Continue with current plan of care.

## 2024-07-15 ENCOUNTER — OFFICE VISIT (OUTPATIENT)
Age: 54
End: 2024-07-15
Payer: COMMERCIAL

## 2024-07-15 DIAGNOSIS — I63.50 CEREBROVASCULAR ACCIDENT (CVA) DUE TO STENOSIS OF CEREBRAL ARTERY (HCC): ICD-10-CM

## 2024-07-15 DIAGNOSIS — I69.320 APHASIA AS LATE EFFECT OF CEREBROVASCULAR ACCIDENT (CVA): Primary | ICD-10-CM

## 2024-07-15 DIAGNOSIS — I63.50 CEREBROVASCULAR ACCIDENT (CVA) DUE TO STENOSIS OF CEREBRAL ARTERY (HCC): Primary | ICD-10-CM

## 2024-07-15 PROCEDURE — 97112 NEUROMUSCULAR REEDUCATION: CPT

## 2024-07-15 PROCEDURE — 92507 TX SP LANG VOICE COMM INDIV: CPT

## 2024-07-15 NOTE — PROGRESS NOTES
"Daily Speech Treatment Note    Today's date: 7/15/2024   Patient’s name: Severiano Ball  : 1970  MRN: [id number]  Safety measures: recent CVA w/stent  Referring provider: Selina Perez,*    Encounter Diagnosis     ICD-10-CM    1. 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 Cerebrovascular accident (CVA) due to stenosis of cerebral artery (HCC)  I63.50             Visit Tracking:  POC expires Unit limit Auth Expiration date PT/OT + Visit Limit?   10/10/24                                                 Visit/Unit Tracking  AUTH Status:  Date 7/10  IE  7/11  7/15                     yes Used 1  2  3                       Remaining                                Subjective/Behavioral:   No new concerns reported.  Provided patient and spouse talkpath therapy. Created account for HE[facility].    Objective/Assessment:  -Patient's family member/caregiver was present during today's session.  Several semantic paraphasias noted when patient produced intelligible speech. Minimal instances of spontaneous speech noted throughout the session.    Short-term goals:   LISTENING  Patient will follow one-step verbal directions (e.g., close your eyes) with 60% accuracy to facilitate increased auditory comprehension skills.    Probed patient's ability to receptively identify common items through one-step (\"point to\") commands. VF2. Patient identified common items from F.O. 2 with 20% accuracy, IND. Accuracy increased to 50% given max cues including immediate models, direct verbal instruction, and hand-over-hand assist. Patient frequently guessed c/b by pointing to both pictures. Attempted semantic prompts (\"point to the one that flies\"), however was unsuccessful       Patient will answer verbal Yes/No questions with 60% accuracy to facilitate increased auditory comprehension skills.       READING  Patient will facilitate functional reading skills by matching word to picture with 60% accuracy over " 5 sessions to facilitate growth of vocabulary concept comprehension, to be achieved in 4-6 weeks.     Patient will facilitate functional reading skills by matching word to phrase/function with 60% accuracy over 5 sessions to facilitate growth of vocabulary concept comprehension, to be achieved in 4-6 weeks.     Patient will facilitate functional reading skills by following one-step written directions (e.g., close your eyes) with 60% accuracy to facilitate increased reading comprehension skills, to be achieved in 4-6 weeks.     Patient will facilitate functional reading skills by naming opposites (e.g., hot and ___) with choice of 3 with 60% accuracy to build expressive vocabulary for conversation, to be achieved in 4-6 weeks.     Patient will facilitate functional reading skills by naming synonyms (e.g., street or ___) with choice of 3 with 60% accuracy to build expressive vocabulary for conversation, to be achieved in 4-6 weeks.     Patient will facilitate functional reading skills by answering written Yes/No questions with 60% accuracy to facilitate increased reading comprehension skills, to be achieved in 4-6 weeks.     Patient will facilitate functional reading skills by providing an appropriate word for sentence completion task (e.g., open the ___) with 60% accuracy to facilitate increased expressive language skills, to be achieved in 4-6 weeks.     WRITING  Patient will complete simple writing tasks at the word level with >60% acc to increase writing skills within FLE, to be achieved in 4-6 weeks.    Attempted to write name, however, unsuccessful with max verbal and visual prompting.      Patient will improve functional writing skills         SPEAKING  Patient will complete naming of automatic tasks (e.g., counting, listing days of the week/months of the year, etc.) with 60% accuracy to facilitate increased verbal expression skills, to be achieved in 4-6 weeks.     Patient will imitate words/phrases using  MADISON/tapping/pacing with 60% accuracy to facilitate increased verbal expression skills, to be achieved in 4-6 weeks.     Patient will name opposite (e.g., hot and ___) with 60% accuracy to build expressive vocabulary for conversation, to be achieved in 4-6 weeks.     Patient will name a synonym (e.g., street or ___) with 60% accuracy to build expressive vocabulary for conversation, to be achieved in 4-6 weeks.     Patient will name appropriate category for three words (e.g., apple, banana, pear = fruits) with 60% accuracy to facilitate improved generative naming skills, to be achieved in 4-6 weeks.     Patient will provide an appropriate word for sentence completion task (e.g., open the ___) with 60% accuracy to facilitate increased expressive language skills, to be achieved in 4-6 weeks.     Patient will provide an adequate response to generative naming task (i.e., name as many…) with 60% accuracy to facilitate increased expressive language skills, to be achieved in 4-6 weeks.     Patient will provide an adequate response to confrontation naming task (i.e., what is…) with 60% accuracy to facilitate increased expressive language skills, to be achieved in 4-6 weeks.     Patient will name up to 5 features to describe a person/place/thing with 60% accuracy to facilitate improved utilization of circumlocution strategy, to be achieved in 4-6 weeks.     Patient will complete picture description tasks using language organization strategies with 60% accuracy to facilitate improved utilization of circumlocution strategy, to be achieved in 4-6 weeks.    Plan:  -Continue with current plan of care.

## 2024-07-15 NOTE — PROGRESS NOTES
Daily Note     Today's date: 7/15/2024  Patient name: Severiano Ball  : 1970  MRN: [id number]  Referring provider: Selina Perez,*  Dx:   Encounter Diagnosis     ICD-10-CM    1. Cerebrovascular accident (CVA) due to stenosis of cerebral artery (HCC)  I63.50           Subjective: Pt and wife w/ nothing new to report.      Objective: See treatment diary below    SOLO for all:    - HKM 20'x6    - foam beams (2) walking fwd/lat x3 laps ea    - foam beam tennis ball LOS catch and throw/ bounce/chest pass 30x with B/L UE catch/ RUE throw    - Bosu step-ups fwd/lat x20 ea b/l 12# KB in ea hand    - hurdles (6 w/ varied heights & spacing) & 12# KB in ea hand fwd/lat x5 laps ea    - stool step ups with 12# KB B/L 10x    - STS repeats w/ 20# TT x30    - TM   Incline 10% 2' @ 2.5mph   Incline 5% 2' @ 2.8mph 5# AW on RLE   1% incline 3' @ 3.0mph 5# AW on RLE   1% incline 1' @ 3.0mph 5# AW on RLE with bean bag step overs (20x)       Assessment: Tolerated first treatment well. Milt LOB with lat step ups onto bosu ball today. Improved carryover and speech this session. Good tolerance on TM noting improved step length and heel strike with RLE using bean bag step overs. NV use ankle weight entire session.  Global aphasia makes achieving form w/ certain exercises (HKM & STS w/ TT hold) difficulty; continues to do better w/ tactile cues. Patient demonstrated fatigue post treatment and would benefit from continued PT.       Plan: Continue per plan of care.  Progress treatment as tolerated.       POC expires Unit limit Auth Expiration date PT/OT + Visit Limit? Co-Insurance   10/10/24                                    Visit/Unit Tracking  AUTH Status:  Date 7/10 7/11 7/15             Used                Remaining

## 2024-07-16 ENCOUNTER — APPOINTMENT (OUTPATIENT)
Age: 54
End: 2024-07-16
Payer: COMMERCIAL

## 2024-07-16 ENCOUNTER — OFFICE VISIT (OUTPATIENT)
Age: 54
End: 2024-07-16
Payer: COMMERCIAL

## 2024-07-16 ENCOUNTER — EVALUATION (OUTPATIENT)
Age: 54
End: 2024-07-16
Payer: COMMERCIAL

## 2024-07-16 DIAGNOSIS — I69.320 APHASIA AS LATE EFFECT OF CEREBROVASCULAR ACCIDENT (CVA): Primary | ICD-10-CM

## 2024-07-16 DIAGNOSIS — I63.50 CEREBROVASCULAR ACCIDENT (CVA) DUE TO STENOSIS OF CEREBRAL ARTERY (HCC): Primary | ICD-10-CM

## 2024-07-16 DIAGNOSIS — I63.50 CEREBROVASCULAR ACCIDENT (CVA) DUE TO STENOSIS OF CEREBRAL ARTERY (HCC): ICD-10-CM

## 2024-07-16 PROCEDURE — 97165 OT EVAL LOW COMPLEX 30 MIN: CPT

## 2024-07-16 PROCEDURE — 92507 TX SP LANG VOICE COMM INDIV: CPT

## 2024-07-16 PROCEDURE — 97112 NEUROMUSCULAR REEDUCATION: CPT

## 2024-07-16 PROCEDURE — 97530 THERAPEUTIC ACTIVITIES: CPT

## 2024-07-16 NOTE — PROGRESS NOTES
"Daily Speech Treatment Note    Today's date: 2024   Patient’s name: Severiano Ball  : 1970  MRN: [id number]  Safety measures: recent CVA w/stent  Referring provider: Selina Perez,*    Encounter Diagnosis     ICD-10-CM    1. 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 Cerebrovascular accident (CVA) due to stenosis of cerebral artery (HCC)  I63.50               Visit Tracking:  POC expires Unit limit Auth Expiration date PT/OT + Visit Limit?   10/10/24                                                 Visit/Unit Tracking  AUTH Status:  Date 7/10  IE  7/11  7/15  7/16                   yes Used 1  2  3  4                     Remaining                                Subjective/Behavioral:  Spouse was not present for today's session    Objective/Assessment:  Spontaneous speech increased for today's session: \"not too much\", \"numbers\", \"one too much\" during counting task!    Short-term goals:   LISTENING  Patient will follow one-step verbal directions (e.g., close your eyes) with 60% accuracy to facilitate increased auditory comprehension skills.         Patient will answer verbal Yes/No questions with 60% accuracy to facilitate increased auditory comprehension skills.       READING  Patient will facilitate functional reading skills by matching word to picture with 60% accuracy over 5 sessions to facilitate growth of vocabulary concept comprehension, to be achieved in 4-6 weeks.    Matching numbers to according numbers in VF4: 100% acc independently!  Matching letters to according letter in VF4: 100% acc independently!  Matching shapes to according shape in VF4: 100% acc independently!  Matching functional words (hi, bye, yes, no, help) to according word in VF4: 100% acc with min verbal prompting, 2nd attempt patient completed task with 100% acc independently!    For matching task, clinician presented the card in a row and clinician gave patient the cards to match in a " randomized order. Patient was able to recognize the matching picture stimuli! Will increase card amount for each trial and incorporate following direction task for next session.        Patient will facilitate functional reading skills by matching word to phrase/function with 60% accuracy over 5 sessions to facilitate growth of vocabulary concept comprehension, to be achieved in 4-6 weeks.     Patient will facilitate functional reading skills by following one-step written directions (e.g., close your eyes) with 60% accuracy to facilitate increased reading comprehension skills, to be achieved in 4-6 weeks.     Patient will facilitate functional reading skills by naming opposites (e.g., hot and ___) with choice of 3 with 60% accuracy to build expressive vocabulary for conversation, to be achieved in 4-6 weeks.     Patient will facilitate functional reading skills by naming synonyms (e.g., street or ___) with choice of 3 with 60% accuracy to build expressive vocabulary for conversation, to be achieved in 4-6 weeks.     Patient will facilitate functional reading skills by answering written Yes/No questions with 60% accuracy to facilitate increased reading comprehension skills, to be achieved in 4-6 weeks.     Patient will facilitate functional reading skills by providing an appropriate word for sentence completion task (e.g., open the ___) with 60% accuracy to facilitate increased expressive language skills, to be achieved in 4-6 weeks.     WRITING  Patient will complete simple writing tasks at the word level with >60% acc to increase writing skills within FLE, to be achieved in 4-6 weeks.        Patient will improve functional writing skills         SPEAKING  Patient will complete naming of automatic tasks (e.g., counting, listing days of the week/months of the year, etc.) with 60% accuracy to facilitate increased verbal expression skills,     Provided visual aid of letters A-C. After max verbal/visual prompting/models,  hand-over-hand gesturing & repetition, patient was unable to verbalize shown letters. However, patient gestured to each letter and started counting them. Then presented patient a visual aid of numbers 1-4. Patient was able to spontaneously verbalize 1-4 in 3/10 opp. Could not increase to numbers 5-6. However, when verbally prompting to repeat the numbers patient was unable to with max cueing. Patient perseverated on number 2 and stated unrelated jargon while gesturing to each number.    From functional word matching task: clinician provided the gestures associated with each word (Ex. Hi= waves, yes=thumbs up, no= thumbs down). Patient infrequently was able to follow max gesture model from clinician, however unable to complete thumbs down for no. At the end of today's session, patient was able to wave goodbye!       Patient will imitate words/phrases using MADISON/tapping/pacing with 60% accuracy to facilitate increased verbal expression skills,      Patient will name opposite (e.g., hot and ___) with 60% accuracy to build expressive vocabulary for conversation,     Patient will name a synonym (e.g., street or ___) with 60% accuracy to build expressive vocabulary for conversation,     Patient will name appropriate category for three words (e.g., apple, banana, pear = fruits) with 60% accuracy to facilitate improved generative naming skills,     Patient will provide an appropriate word for sentence completion task (e.g., open the ___) with 60% accuracy to facilitate increased expressive language skills,    Patient will provide an adequate response to generative naming task (i.e., name as many…) with 60% accuracy to facilitate increased expressive language skills,      Patient will provide an adequate response to confrontation naming task (i.e., what is…) with 60% accuracy to facilitate increased expressive language skills, t     Patient will name up to 5 features to describe a person/place/thing with 60% accuracy to  facilitate improved utilization of circumlocution strategy,     Patient will complete picture description tasks using language organization strategies with 60% accuracy to facilitate improved utilization of circumlocution strategy,     Plan:  -Continue with current plan of care.

## 2024-07-16 NOTE — PROGRESS NOTES
Daily Note     Today's date: 2024  Patient name: Severiano Ball  : 1970  MRN: [id number]  Referring provider: Selina Perez,*  Dx:   Encounter Diagnosis     ICD-10-CM    1. Cerebrovascular accident (CVA) due to stenosis of cerebral artery (HCC)  I63.50           Subjective: No new information and no new complaints.      Objective: See treatment diary below    SOLO for all:    HKM 20'x6    Bosu step-ups fwd/lat x20 ea direction    Stool step ups with 15# KB 10x each side (able to hold in both hands)    STS repeats w/ 20# TT x30    TM   Incline 1% with 5# AW on RLE at 2.5-3 mph for 5 mins   Incline 1% no AW at 2.5-3 mph for 5 mins  Amb with increased speed via L HHA, cues for ground clearance/step length 500'x1  Amb with L HHA, 5# AW on RLE, cues speed and ground clearance 500'x1       Assessment: Tolerated first treatment well. RLE ground clearance is intermittent, only sufficient 50% of the time. Global aphasia makes achieving form w/ certain exercises (HKM & STS w/ TT hold) difficult; continues to do better w/ tactile cues and demonstrations. Patient demonstrated fatigue post treatment and would benefit from continued PT.       Plan: Continue per plan of care.  Progress treatment as tolerated.       POC expires Unit limit Auth Expiration date PT/OT + Visit Limit? Co-Insurance   10/10/24                                    Visit/Unit Tracking  AUTH Status:  Date 7/10 7/11 7/15 7/16            Used                Remaining

## 2024-07-16 NOTE — PROGRESS NOTES
OCCUPATIONAL THERAPY INITIAL EVALUATION      POC expires Auth Status Total   Visits  Start date  Expiration date PT/OT + Visit Limit? Co-Insurance   10/18 Required                                                 Visit/Unit Tracking  AUTH Status: required  Date               Visits  Authed:  Used 1 (IE)               Remaining                  PLAN OF CARE START:24  PLAN OF CARE END: 10/18/2024  PROGRESS NOTE DUE: follow up post scheduling  FREQUENCY: 2-3x/week for 8-12 weeks  PRECAUTIONS: global aphasia   DIAGNOSIS: CVA, R side weakness and global aphasia   VISITS: 1 (IE)       Today's date: 2024  Patient name: Severiano Ball  : 1970  MRN: [id number]  Referring provider: Selina Perez,*  Dx:   Encounter Diagnosis     ICD-10-CM    1. Cerebrovascular accident (CVA) due to stenosis of cerebral artery (HCC)  I63.50 Ambulatory Referral to Occupational Therapy              SKILLED ANALYSIS:  Pt is a right hand dominant 54 year old male presenting to OP OT s/p CVA in 2024 resulting in global aphasia and R side weaknesss.  Pt is independent with ADLs and receives assist with IADLs (wife has been doing this prior to CVA). Wife present end of session to assist with answering questions related to occupational profile and medical history.  Pt wife currently reports difficulty with strength of R UE. Wife reports patient was dropping his phone prior and is not dropping anymore.  Pt is demonstrating deficits based on clinical observation and the following assessments: HALLE  strength, lateral pinch, 9 hole peg test, and functional dexterity test. Post assessments pt demonstrating the following: impaired R side distal strength, coordination, and in hand manipulation noted. Unable to assess 2 point pinch, 3 point pinch, or MMT secondary to impaired cognitive functions/global aphasia and inability to follow instructions to complete testing at this time. Recommend OP OT 2-3x/wk for 8-12  "weeks with focus on aforementioned deficits to maximize functional performance and improve QOL.  Findings and recommendations discussed with pt, and they are in agreement. Educated pt on charges of insurance, assessments performed with standardized norms, POC, goal creation, and OP OT services.     Subjective  Occupational Profile  *type of home: private home  *lives with: wife  *vocation:  and loves fishing   *driving: no  *DME: none  *Assistive device for inside home: none  *Assistive device for outside home: none  *AE for ADLs: none  *ADL status: independently   *IADL status (cooking, cleaning, community mobility, medication management, finances): wife assists or provides with supervision    PATIENT GOAL: \"to get back to fishing\" -per wife    PAIN: unable to verbalize if pt has pain this session    HISTORY OF PRESENT ILLNESS:   Pt is a 54 y.o. male who was referred to Occupational Therapy s/p  Cerebrovascular accident (CVA) due to stenosis of cerebral artery (HCC) [I63.50]. \"Pt was recently in hospital in [place], [place] for stroke. He had a stent placed while there. He has some weakness on his right side but is able to feed himself and walk some. He is having speech difficulties. He left AMA from the hospital and came home. He now is in need of rehab services. They have no records from the hospital.\" Pt wife reports CVA occurred on 6/12/24.      PMH:   Past Medical History:   Diagnosis Date    Asthma     Erectile dysfunction     GERD (gastroesophageal reflux disease)     Hyperlipidemia     Kidney stone     Psoriasis     Squamous cell skin cancer 01/31/2023    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       Past Surgical Hx:   Past Surgical History:   Procedure Laterality Date    COLONOSCOPY      HAND SURGERY Right     HERNIA REPAIR Bilateral 11/17/2021    Procedure: REPAIR HERNIA INGUINAL, LAPAROSCOPIC BILATERAL WITH MESH, TRANS ABDOMINAL;  Surgeon: Melvin Marquez MD;  Location: [facility];  Service: General    " MOHS SURGERY  05/10/2023    Right cheek-Dr. Marie    AL ADJT TIS TRNS/REARGMT F/C/C/M/N/A/G/H/F 10SQCM/< Right 5/11/2023    Procedure: RECONSTRUCTION OF MOHS DEFECT RIGHT CHEEK WITH LOCAL FLAP;  Surgeon: Hola Tan MD;  Location: [facility];  Service: Plastics    SKIN BIOPSY      TONSILLECTOMY      WISDOM TOOTH EXTRACTION      teeth extraction          Objective    Impairment Observations:    R UE Status on IE:7/16/24 L UE Status on IE: 7/16/24 Comments           UPPER EXTREMITY FUNCTION   Impaired Intact Dominant Hand: unable to determine this session secondary to global aphasia      /PINCH STRENGTH             Dynamometer    - Gross Grasp  abnormal - R UE norm: 101lbs 35 lbs 60 lbs    Pinch Meter     - Lateral  abnormal - R UE norm: 24lbs 7 lbs  8 lbs      COORDINATION      9 Hole Peg Test-  assesses dexterity/fine motor coordination     abnormal - R UE norm: 21 seconds   1 minute and 10 seconds 29 seconds seconds         Fxnl Dexterity Test-assesses patient's ability to use the hand for daily tasks requiring a 3-jaw mackenzie prehension between the fingers and the thumb    abnormal - R UE norm: 24.5 seconds   1 minute and 9  seconds; uses board to flip  43  seconds            SHORT TERM GOALS (6-8 weeks)    GOAL  STATUS ON IE  GOAL STATUS    Pt will demo with G tolerance to in stance exercise x 20 minutes with minimal rest breaks required for increased engagement in life roles and weekly exercise regimen   Established      Pt will increase R UE  strength by 2 lbs to complete life roles and IADLs 35lbs Established      Pt will increase  R UE lateral  pinch strength by 2lbs  to complete functional ADLs and IADLs with increased independence  7lbs Established      Pt will demonstrate improved FMC as evidenced by performing 9 hole peg test with R UE in 1 minute and 7 seconds or less in order to increase independence with life roles 1 minute and 10 seconds Established      Pt will demonstrate improved in  hand manipulation skills as evidenced by performing functional dexterity test with R UE in 1 minute and 7 seconds or less in order to increase independence with life roles 1 minute and 9 seconds Established      LONG TERM GOALS( 8-12 weeks)    GOAL  STATUS ON IE  GOAL STATUS    Pt will demo with G carryover of Home Exercise Program to improve functional progression towards goals in plan of care and for improved functional use of UE  Established      Pt will increase R UE  strength by 5 lbs to complete life roles and IADLs 35lbs Established      Pt will increase  R UE lateral  pinch strength by 4lbs  to complete functional ADLs and IADLs with increased independence  7lbs Established      Pt will demonstrate improved FMC as evidenced by performing 9 hole peg test with R UE in 1 minute and 5 seconds or less in order to increase independence with life roles 1 minute and 10 seconds Established      Pt will demonstrate improved in hand manipulation skills as evidenced by performing functional dexterity test with R UE in 1 minute and 4 seconds or less in order to increase independence with life roles 1 minute and 9 seconds Established            OTHER PLANNED THERAPY INTERVENTIONS:   Supine, seated, and in stance neuro re-ed  Tricep AG  NMES/FES  FMC/prehension  Timed Trials  Manual tx  Hand to target  Sensory re-ed  Seated functional reach: crossing midline  Supine place and hold  WBearing strategies   Closed chain activities  Open chain activities  Internal and external memory aides  Multimatrix for saccades/ visual clutter/attention  Hypersensitivity strategies education  Multi-modal environment  Sustained/alternating/divided attention  Tracking tube  Oculomotor control:  saccades, con/divergence  Conv./div. Dynamic tasks  Work stations with timed transitions  Temporal Awareness  Memory and mental manipulation  Auditory processing with immediate recall  Memory retention with immediate and delayed recall  Edu on  cog/vision apps      Stroke survey: 34

## 2024-07-18 ENCOUNTER — APPOINTMENT (OUTPATIENT)
Age: 54
End: 2024-07-18
Payer: COMMERCIAL

## 2024-07-18 ENCOUNTER — TELEPHONE (OUTPATIENT)
Age: 54
End: 2024-07-18

## 2024-07-18 ENCOUNTER — OFFICE VISIT (OUTPATIENT)
Dept: FAMILY MEDICINE CLINIC | Facility: CLINIC | Age: 54
End: 2024-07-18
Payer: COMMERCIAL

## 2024-07-18 VITALS
HEIGHT: 70 IN | DIASTOLIC BLOOD PRESSURE: 82 MMHG | HEART RATE: 75 BPM | BODY MASS INDEX: 23.77 KG/M2 | TEMPERATURE: 97.5 F | OXYGEN SATURATION: 97 % | WEIGHT: 166 LBS | SYSTOLIC BLOOD PRESSURE: 124 MMHG

## 2024-07-18 DIAGNOSIS — T78.40XD ALLERGIC REACTION TO DRUG, SUBSEQUENT ENCOUNTER: Primary | ICD-10-CM

## 2024-07-18 DIAGNOSIS — I63.50 CEREBROVASCULAR ACCIDENT (CVA) DUE TO STENOSIS OF CEREBRAL ARTERY (HCC): ICD-10-CM

## 2024-07-18 PROBLEM — T78.40XA ALLERGIC DRUG REACTION: Status: ACTIVE | Noted: 2024-07-18

## 2024-07-18 PROCEDURE — 99213 OFFICE O/P EST LOW 20 MIN: CPT | Performed by: PHYSICIAN ASSISTANT

## 2024-07-18 NOTE — PROGRESS NOTES
"Daily Speech Treatment Note    Today's date: 2024   Patient’s name: Severiano Ball  : 1970  MRN: [id number]  Safety measures: recent CVA w/stent  Referring provider: Selina Perez,*    No diagnosis found.          Visit Tracking:  POC expires Unit limit Auth Expiration date PT/OT + Visit Limit?   10/10/24                                                 Visit/Unit Tracking  AUTH Status:  Date 7/10  IE  7/11  7/15  7/16                   yes Used 1  2  3  4                     Remaining   17 16  15  14                        Subjective/Behavioral:  Spouse was not present for today's session    Objective/Assessment:  Spontaneous speech increased for today's session: \"not too much\", \"numbers\", \"one too much\" during counting task!    Short-term goals:   LISTENING  Patient will follow one-step verbal directions (e.g., close your eyes) with 60% accuracy to facilitate increased auditory comprehension skills.         Patient will answer verbal Yes/No questions with 60% accuracy to facilitate increased auditory comprehension skills.       READING  Patient will facilitate functional reading skills by matching word to picture with 60% accuracy over 5 sessions to facilitate growth of vocabulary concept comprehension, to be achieved in 4-6 weeks.    Matching numbers to according numbers in VF4: 100% acc independently!  Matching letters to according letter in VF4: 100% acc independently!  Matching shapes to according shape in VF4: 100% acc independently!  Matching functional words (hi, bye, yes, no, help) to according word in VF4: 100% acc with min verbal prompting, 2nd attempt patient completed task with 100% acc independently!    For matching task, clinician presented the card in a row and clinician gave patient the cards to match in a randomized order. Patient was able to recognize the matching picture stimuli! Will increase card amount for each trial and incorporate following direction task for next " session.        Patient will facilitate functional reading skills by matching word to phrase/function with 60% accuracy over 5 sessions to facilitate growth of vocabulary concept comprehension, to be achieved in 4-6 weeks.     Patient will facilitate functional reading skills by following one-step written directions (e.g., close your eyes) with 60% accuracy to facilitate increased reading comprehension skills, to be achieved in 4-6 weeks.     Patient will facilitate functional reading skills by naming opposites (e.g., hot and ___) with choice of 3 with 60% accuracy to build expressive vocabulary for conversation, to be achieved in 4-6 weeks.     Patient will facilitate functional reading skills by naming synonyms (e.g., street or ___) with choice of 3 with 60% accuracy to build expressive vocabulary for conversation, to be achieved in 4-6 weeks.     Patient will facilitate functional reading skills by answering written Yes/No questions with 60% accuracy to facilitate increased reading comprehension skills, to be achieved in 4-6 weeks.     Patient will facilitate functional reading skills by providing an appropriate word for sentence completion task (e.g., open the ___) with 60% accuracy to facilitate increased expressive language skills, to be achieved in 4-6 weeks.     WRITING  Patient will complete simple writing tasks at the word level with >60% acc to increase writing skills within FLE, to be achieved in 4-6 weeks.        Patient will improve functional writing skills         SPEAKING  Patient will complete naming of automatic tasks (e.g., counting, listing days of the week/months of the year, etc.) with 60% accuracy to facilitate increased verbal expression skills,     Provided visual aid of letters A-C. After max verbal/visual prompting/models, hand-over-hand gesturing & repetition, patient was unable to verbalize shown letters. However, patient gestured to each letter and started counting them. Then presented  patient a visual aid of numbers 1-4. Patient was able to spontaneously verbalize 1-4 in 3/10 opp. Could not increase to numbers 5-6. However, when verbally prompting to repeat the numbers patient was unable to with max cueing. Patient perseverated on number 2 and stated unrelated jargon while gesturing to each number.    From functional word matching task: clinician provided the gestures associated with each word (Ex. Hi= waves, yes=thumbs up, no= thumbs down). Patient infrequently was able to follow max gesture model from clinician, however unable to complete thumbs down for no. At the end of today's session, patient was able to wave goodbye!       Patient will imitate words/phrases using [place]/tapping/pacing with 60% accuracy to facilitate increased verbal expression skills,      Patient will name opposite (e.g., hot and ___) with 60% accuracy to build expressive vocabulary for conversation,     Patient will name a synonym (e.g., street or ___) with 60% accuracy to build expressive vocabulary for conversation,     Patient will name appropriate category for three words (e.g., apple, banana, pear = fruits) with 60% accuracy to facilitate improved generative naming skills,     Patient will provide an appropriate word for sentence completion task (e.g., open the ___) with 60% accuracy to facilitate increased expressive language skills,    Patient will provide an adequate response to generative naming task (i.e., name as many…) with 60% accuracy to facilitate increased expressive language skills,      Patient will provide an adequate response to confrontation naming task (i.e., what is…) with 60% accuracy to facilitate increased expressive language skills, t     Patient will name up to 5 features to describe a person/place/thing with 60% accuracy to facilitate improved utilization of circumlocution strategy,     Patient will complete picture description tasks using language organization strategies with 60% accuracy to  facilitate improved utilization of circumlocution strategy,     Plan:  -Continue with current plan of care.

## 2024-07-18 NOTE — PROGRESS NOTES
Assessment/Plan:          Diagnoses and all orders for this visit:    Allergic reaction to drug, subsequent encounter  Comments:  stop Plavix and take an OTC atinhistamine such as Allegra, Claritin and/or Benadryl.    Cerebrovascular accident (CVA) due to stenosis of cerebral artery (HCC)  -     ticagrelor 60 MG; Take 1 tablet (60 mg total) by mouth every 12 (twelve) hours            Subjective:      Patient ID: Severiano Ball is a 54 y.o. male.    Pt went to ER for urticaria. He had just started taking Plavix and Crestor. He stopped both after ER visit and re started Plavix several days later. He now has a rash only on his face.     Rash  This is a recurrent problem. The current episode started 1 to 4 weeks ago. The affected locations include the face. The problem is mild. The rash is characterized by itchiness, dryness and redness. He was exposed to a new medication. The rash first occurred at work. Associated symptoms include facial edema and itching. Pertinent negatives include no congestion, cough, decreased responsiveness, fever, shortness of breath or sore throat. Past treatments include moisturizer. The treatment provided no relief. There were no sick contacts.       The following portions of the patient's history were reviewed and updated as appropriate:   He has a past medical history of Asthma, Erectile dysfunction, GERD (gastroesophageal reflux disease), Hyperlipidemia, Kidney stone, Psoriasis, and Squamous cell skin cancer (01/31/2023).,  does not have any pertinent problems on file.,   has a past surgical history that includes Hand surgery (Right); Tonsillectomy; [place] tooth extraction; Colonoscopy; Hernia repair (Bilateral, 11/17/2021); Skin biopsy; Mohs surgery (05/10/2023); and pr adjt tis trns/reargmt f/c/c/m/n/a/g/h/f 10sqcm/< (Right, 5/11/2023).,  family history includes Diabetes in his mother; Prostate cancer in his father.,   reports that he has been smoking cigarettes. He has a 52.5  "pack-year smoking history. He has quit using smokeless tobacco. He reports that he does not currently use alcohol. He reports that he does not currently use drugs after having used the following drugs: Marijuana. Frequency: 7.00 times per week.,  has No Known Allergies..  Current Outpatient Medications   Medication Sig Dispense Refill   • rosuvastatin (CRESTOR) 5 mg tablet Take 1 tablet (5 mg total) by mouth daily 30 tablet 5   • ticagrelor 60 MG Take 1 tablet (60 mg total) by mouth every 12 (twelve) hours 60 tablet 2   • triamcinolone (KENALOG) 0.5 % cream Apply topically 3 (three) times a day 45 g 2     No current facility-administered medications for this visit.       Review of Systems   Constitutional:  Negative for decreased responsiveness and fever.   HENT:  Negative for congestion, sore throat and trouble swallowing.    Respiratory:  Negative for cough, chest tightness and shortness of breath.    Skin:  Positive for itching and rash.         Objective:  Vitals:    07/18/24 1109   BP: 124/82   Pulse: 75   Temp: 97.5 °F (36.4 °C)   SpO2: 97%   Weight: 75.3 kg (166 lb)   Height: 5' 10\" (1.778 m)     Body mass index is 23.82 kg/m².     Physical Exam  Vitals and nursing note reviewed.   Constitutional:       Appearance: Normal appearance.   Cardiovascular:      Rate and Rhythm: Normal rate.   Pulmonary:      Effort: Pulmonary effort is normal.   Skin:     Findings: Erythema and rash present. Rash is urticarial.   Neurological:      Mental Status: He is alert and oriented to person, place, and time.   Psychiatric:         Mood and Affect: Mood normal.         Behavior: Behavior normal.           "

## 2024-07-22 NOTE — PROGRESS NOTES
Daily Note     Today's date: 2024  Patient name: Severiano Ball  : 1970  MRN: [id number]  Referring provider: Selina Perez,*  Dx:   Encounter Diagnosis     ICD-10-CM    1. Cerebrovascular accident (CVA) due to stenosis of cerebral artery (HCC)  I63.50           Subjective: No new information and no new complaints.      Objective: See treatment diary below    SOLO for all:    HKM 20'x6    Bosu step-ups fwd/lat x20 ea direction    Stool step ups with 15# KB 10x each side (able to hold in both hands)    STS repeats w/ 20# TT x30    TM   Incline 1% with 5# AW on RLE at 2.5-3 mph for 5 mins   Incline 1% no AW at 2.5-3 mph for 5 mins  Amb with increased speed via L HHA, cues for ground clearance/step length 500'x1  Amb with L HHA, 5# AW on RLE, cues speed and ground clearance 500'x1       Assessment: Tolerated first treatment well. RLE ground clearance is intermittent, only sufficient 50% of the time. Global aphasia makes achieving form w/ certain exercises (HKM & STS w/ TT hold) difficult; continues to do better w/ tactile cues and demonstrations. Patient demonstrated fatigue post treatment and would benefit from continued PT.       Plan: Continue per plan of care.  Progress treatment as tolerated.       POC expires Unit limit Auth Expiration date PT/OT + Visit Limit? Co-Insurance   10/10/24                                    Visit/Unit Tracking  AUTH Status:  Date 7/10 7/11 7/15 7/16 7/23           Used                Remaining

## 2024-07-23 ENCOUNTER — OFFICE VISIT (OUTPATIENT)
Age: 54
End: 2024-07-23
Payer: COMMERCIAL

## 2024-07-23 DIAGNOSIS — I63.50 CEREBROVASCULAR ACCIDENT (CVA) DUE TO STENOSIS OF CEREBRAL ARTERY (HCC): Primary | ICD-10-CM

## 2024-07-23 DIAGNOSIS — I69.320 APHASIA AS LATE EFFECT OF CEREBROVASCULAR ACCIDENT (CVA): Primary | ICD-10-CM

## 2024-07-23 DIAGNOSIS — I63.50 CEREBROVASCULAR ACCIDENT (CVA) DUE TO STENOSIS OF CEREBRAL ARTERY (HCC): ICD-10-CM

## 2024-07-23 PROCEDURE — 97112 NEUROMUSCULAR REEDUCATION: CPT

## 2024-07-23 PROCEDURE — 92507 TX SP LANG VOICE COMM INDIV: CPT

## 2024-07-23 PROCEDURE — 97112 NEUROMUSCULAR REEDUCATION: CPT | Performed by: PHYSICAL THERAPIST

## 2024-07-23 NOTE — PROGRESS NOTES
"Daily Speech Treatment Note    Today's date: 2024   Patient’s name: Severiano Ball  : 1970  MRN: [id number]  Safety measures: recent CVA w/stent  Referring provider: Selina Perez,*    Encounter Diagnosis     ICD-10-CM    1. 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 Cerebrovascular accident (CVA) due to stenosis of cerebral artery (HCC)  I63.50           Visit Tracking:  POC expires Unit limit Auth Expiration date PT/OT + Visit Limit?   10/10/24                                                 Visit/Unit Tracking  AUTH Status:  Date 7/10  IE  7/11  7/15  7/16  7/23                 yes Used 1  2  3  4  5                   Remaining   17 16  15  14   11                     Subjective/Behavioral:  Spouse was not present for today's session    Objective/Assessment:  Spontaneous speech increased for today's session in conversation: \"thank you\" and attempted to verbalize a conversation about fishing however was unintelligible and not able to correct due to lack of comprehension of cues.  -Gave patient an aphasia ID to use in different communication settings.    Short-term goals:   LISTENING  Patient will follow one-step verbal directions (e.g., close your eyes) with 60% accuracy to facilitate increased auditory comprehension skills.    Patient was given a communication board with common phrases from [employer]. Patient was provided education and reviewed the common phrases with associated picture (yes, no, maybe, I understand, I don't understand, I have something to say). Patient was given the direction to  point to the given common phrase. Task completed in / opp (62% acc)  from max cueing. Patient benefited from hand-over-hand gestures, repetition.      Different set of common phrases (hello, goodbye, it's good to see you, how are you, thank you, you're welcome) Task completed in /42 opp (61% acc) from max cueing. Patient benefited from hand-over-hand " gestures, repetition.       Patient was given a VF4 colors and pateint was given the direction to point to a certain color. Task completed in 22/39 opp (61% acc)  from max cueing. Patient benefited from hand-over-hand gestures, repetition.      Noted clinician had to give directions in a spontaneous order due to patient picking up on the pattern. Significant difficulties with comprehension with each task. Accuracy of tasks and infrequency of gestures is suggestive that patient is guessing during following direction tasks.    Patient will answer verbal Yes/No questions with 60% accuracy to facilitate increased auditory comprehension skills.       READING  Patient will facilitate functional reading skills by matching word to picture with 60% accuracy over 5 sessions to facilitate growth of vocabulary concept comprehension, to be achieved in 4-6 weeks.         Patient will facilitate functional reading skills by matching word to phrase/function with 60% accuracy over 5 sessions to facilitate growth of vocabulary concept comprehension, to be achieved in 4-6 weeks.     Patient will facilitate functional reading skills by following one-step written directions (e.g., close your eyes) with 60% accuracy to facilitate increased reading comprehension skills, to be achieved in 4-6 weeks.     Patient will facilitate functional reading skills by naming opposites (e.g., hot and ___) with choice of 3 with 60% accuracy to build expressive vocabulary for conversation, to be achieved in 4-6 weeks.     Patient will facilitate functional reading skills by naming synonyms (e.g., street or ___) with choice of 3 with 60% accuracy to build expressive vocabulary for conversation, to be achieved in 4-6 weeks.     Patient will facilitate functional reading skills by answering written Yes/No questions with 60% accuracy to facilitate increased reading comprehension skills, to be achieved in 4-6 weeks.     Patient will facilitate functional  reading skills by providing an appropriate word for sentence completion task (e.g., open the ___) with 60% accuracy to facilitate increased expressive language skills, to be achieved in 4-6 weeks.     WRITING  Patient will complete simple writing tasks at the word level with >60% acc to increase writing skills within FLE, to be achieved in 4-6 weeks.        Patient will improve functional writing skills         SPEAKING  Patient will complete naming of automatic tasks (e.g., counting, listing days of the week/months of the year, etc.) with 60% accuracy to facilitate increased verbal expression skills,        Patient will imitate words/phrases using MADISON/tapping/pacing with 60% accuracy to facilitate increased verbal expression skills,      Patient will name opposite (e.g., hot and ___) with 60% accuracy to build expressive vocabulary for conversation,     Patient will name a synonym (e.g., street or ___) with 60% accuracy to build expressive vocabulary for conversation,     Patient will name appropriate category for three words (e.g., apple, banana, pear = fruits) with 60% accuracy to facilitate improved generative naming skills,     Patient will provide an appropriate word for sentence completion task (e.g., open the ___) with 60% accuracy to facilitate increased expressive language skills,    Patient will provide an adequate response to generative naming task (i.e., name as many…) with 60% accuracy to facilitate increased expressive language skills,      Patient will provide an adequate response to confrontation naming task (i.e., what is…) with 60% accuracy to facilitate increased expressive language skills, t     Patient will name up to 5 features to describe a person/place/thing with 60% accuracy to facilitate improved utilization of circumlocution strategy,     Patient will complete picture description tasks using language organization strategies with 60% accuracy to facilitate improved utilization of  circumlocution strategy,     Plan:  -Continue with current plan of care.

## 2024-07-23 NOTE — PROGRESS NOTES
Daily Note     Today's date: 2024  Patient name: Severiano Ball  : 1970  MRN: [id number]  Referring provider: Selina Perez,*  Dx:   Encounter Diagnosis     ICD-10-CM    1. Cerebrovascular accident (CVA) due to stenosis of cerebral artery (HCC)  I63.50             Subjective: No new information and no new complaints.      Objective: See treatment diary below    Gait Belt (supervision/min assist) for all:    HKM 40'x4    Bosu step-ups fwd/lat x20 ea direction    STS repeats w/ 20# TT x30    Stool step ups with 15# KB 10x each side (able to hold in both hands)  Amb with increased speed via L HHA, cues for ground clearance/step length 500'x1  Ambulation w/ vertical head turns 400' x 1  Amb with L HHA, 5# AW on RLE, cues speed and ground clearance 500'x1      Was not performed:    TM   Incline 1% with 5# AW on RLE at 2.5-3 mph for 5 mins   Incline 1% no AW at 2.5-3 mph for 5 mins         Assessment: Tolerated first treatment well and continues to perform exercises with proper technique. RLE ground clearance is intermittent, only sufficient 50% of the time. Global aphasia makes achieving form w/ certain exercises (HKM & STS w/ TT hold) difficult; continues to do better w/ tactile cues and demonstrations. Pressure against back was effective cuing to improve speed during ambulation when patient was not able to respond with demonstration and VC. Patient demonstrated fatigue post treatment and would benefit from continued PT.       Plan: Continue per plan of care.  Progress treatment as tolerated.       POC expires Unit limit Auth Expiration date PT/OT + Visit Limit? Co-Insurance   10/10/24                                    Visit/Unit Tracking  AUTH Status:  Date 7/10 7/11 7/15 7/16 7/23           Used                Remaining

## 2024-07-23 NOTE — PROGRESS NOTES
POC expires Auth Status Total   Visits  Start date  Expiration date PT/OT + Visit Limit? Co-Insurance   10/18 Required                                                 Visit/Unit Tracking  AUTH Status: required  Date              Visits  Authed:  Used 1 (IE) 1              Remaining   16               PLAN OF CARE START:24  PLAN OF CARE END: 10/18/2024  PROGRESS NOTE DUE: follow up post scheduling into August   FREQUENCY: 2-3x/week for 8-12 weeks  PRECAUTIONS: global aphasia   DIAGNOSIS: CVA, R side weakness and global aphasia   VISITS: 2 of 18       Daily Note     Today's date: 2024  Patient name: Severiano Ball  : 1970  MRN: [id number]  Referring provider: Selina Perez,*  Dx:   Encounter Diagnosis     ICD-10-CM    1. Cerebrovascular accident (CVA) due to stenosis of cerebral artery (HCC)  I63.50                          Subjective: Reports no changes since last visit       Objective: See treatment below.  NMRE:   *multi matrix seated tabletop-matching small numbered blocks to identical large numbered block in ascending order 1>24. Utilized R UE gross grasp with green resistive clothespin to match blocks together. Required mod A for determination of correct sequencing of numbers, ~15% dropping of blocks secondary to decreased strength. 2# weight added to R UE for increased challenge to promote muscle recruitment and to provide proprioceptive input. Increased time required.     *reach,grasp,release of small marbles towel R side<>top of large mushroom pegs using R UE 2 point pinch. 2# weight added to R UE for increased challenge to promote muscle recruitment and to provide proprioceptive input. ~15% dropping noted.     Assessment: Tolerated treatment well. Benefits from simple task focused commands and visual demo with occasional hand over hand assist secondary to cognitive impairments. Focus of session on strength, motor control, coordination. Pt would benefit from  continued OT services to address R UE  strength, pinch strength, FMC, in hand manipulation skills, standing endurance/activity tolerance.       Plan: Continued skilled OT per POC.

## 2024-07-24 ENCOUNTER — OFFICE VISIT (OUTPATIENT)
Age: 54
End: 2024-07-24
Payer: COMMERCIAL

## 2024-07-24 DIAGNOSIS — I63.50 CEREBROVASCULAR ACCIDENT (CVA) DUE TO STENOSIS OF CEREBRAL ARTERY (HCC): ICD-10-CM

## 2024-07-24 DIAGNOSIS — I69.320 APHASIA AS LATE EFFECT OF CEREBROVASCULAR ACCIDENT (CVA): Primary | ICD-10-CM

## 2024-07-24 DIAGNOSIS — I63.50 CEREBROVASCULAR ACCIDENT (CVA) DUE TO STENOSIS OF CEREBRAL ARTERY (HCC): Primary | ICD-10-CM

## 2024-07-24 PROCEDURE — 97112 NEUROMUSCULAR REEDUCATION: CPT

## 2024-07-24 PROCEDURE — 92507 TX SP LANG VOICE COMM INDIV: CPT

## 2024-07-24 NOTE — PROGRESS NOTES
"Daily Speech Treatment Note    Today's date: 2024   Patient’s name: Severiano Ball  : 1970  MRN: [id number]  Safety measures: recent CVA w/stent  Referring provider: Selina Perez,*    Encounter Diagnosis     ICD-10-CM    1. 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 Cerebrovascular accident (CVA) due to stenosis of cerebral artery (HCC)  I63.50             Visit Tracking:  POC expires Unit limit Auth Expiration date PT/OT + Visit Limit?   10/10/24                                                 Visit/Unit Tracking  AUTH Status:  Date 7/10  IE  7/11  7/15  7/16  7/23                 yes Used 1  2  3  4  5                   Remaining   17 16  15  14   11                     Subjective/Behavioral:  Spouse was not present for today's session    Objective/Assessment:  Spontaneous speech increased for today's session in structured tasks unrelated: \"this will be this one\", \"tomorrow\"    Short-term goals:   LISTENING  Patient will follow one-step verbal directions (e.g., close your eyes) with 60% accuracy to facilitate increased auditory comprehension skills.        Patient will answer verbal Yes/No questions with 60% accuracy to facilitate increased auditory comprehension skills.    With visual aid of yes/no with associated symbols, patient was given verbal basic yes/no questions (is your name severiano, do you live in [place], are you a man). Task completed in 7/15 opp (46% acc) from max cueing. Patient benefited from repetition, hand over hand gestures. Accuracy of tasks and infrequency of gestures is suggestive that patient is guessing during following direction tasks. Attempted to create awareness of guessing, however, unable to comprehend.        READING  Patient will facilitate functional reading skills by matching word to picture with 60% accuracy over 5 sessions to facilitate growth of vocabulary concept comprehension, to be achieved in 4-6 " weeks.    Matching letters with multiple choice of 4: Task completed in 9/10 opp (90% acc) from mod-max cueing. Patient benefited from review of directions   Matching numbers  with multiple choice of 4: Task completed in 10/10 opp (100% acc) ifrom mod-max cueing. Patient benefited from review of directions.    Once patient comprehended directions, faded cueing, however, patient could not follow the direction to [place] the matching number/letter.     Patient will facilitate functional reading skills by matching word to phrase/function with 60% accuracy over 5 sessions to facilitate growth of vocabulary concept comprehension, to be achieved in 4-6 weeks.       Patient will facilitate functional reading skills by following one-step written directions (e.g., close your eyes) with 60% accuracy to facilitate increased reading comprehension skills, to be achieved in 4-6 weeks.     Patient will facilitate functional reading skills by naming opposites (e.g., hot and ___) with choice of 3 with 60% accuracy to build expressive vocabulary for conversation, to be achieved in 4-6 weeks.     Patient will facilitate functional reading skills by naming synonyms (e.g., street or ___) with choice of 3 with 60% accuracy to build expressive vocabulary for conversation, to be achieved in 4-6 weeks.     Patient will facilitate functional reading skills by answering written Yes/No questions with 60% accuracy to facilitate increased reading comprehension skills, to be achieved in 4-6 weeks.     Patient will facilitate functional reading skills by providing an appropriate word for sentence completion task (e.g., open the ___) with 60% accuracy to facilitate increased expressive language skills, to be achieved in 4-6 weeks.     WRITING  Patient will complete simple writing tasks at the word level with >60% acc to increase writing skills within FLE, to be achieved in 4-6 weeks.        Patient will improve functional writing skills          SPEAKING  Patient will complete naming of automatic tasks (e.g., counting, listing days of the week/months of the year, etc.) with 60% accuracy to facilitate increased verbal expression skills,     Attempted to review letters A-D with visual aid, however, patient verbalized numbers instead. With max cueing and redirectives, patient could not verbalize letters. Reviewed numbers 1-8 and patient inconsistently verbalized numbers with max verbal/visual cueing    Patient will imitate words/phrases using MADISON/tapping/pacing with 60% accuracy to facilitate increased verbal expression skills,      Significant difficulties imitating clinician's words with MADISON principles.       Patient will name opposite (e.g., hot and ___) with 60% accuracy to build expressive vocabulary for conversation,     Patient will name a synonym (e.g., street or ___) with 60% accuracy to build expressive vocabulary for conversation,     Patient will name appropriate category for three words (e.g., apple, banana, pear = fruits) with 60% accuracy to facilitate improved generative naming skills,     Patient will provide an appropriate word for sentence completion task (e.g., open the ___) with 60% accuracy to facilitate increased expressive language skills,    Patient will provide an adequate response to generative naming task (i.e., name as many…) with 60% accuracy to facilitate increased expressive language skills,      Patient will provide an adequate response to confrontation naming task (i.e., what is…) with 60% accuracy to facilitate increased expressive language skills, t     Patient will name up to 5 features to describe a person/place/thing with 60% accuracy to facilitate improved utilization of circumlocution strategy,     Patient will complete picture description tasks using language organization strategies with 60% accuracy to facilitate improved utilization of circumlocution strategy,     Plan:  -Continue with current plan of care.

## 2024-07-24 NOTE — PROGRESS NOTES
"Daily Note     Today's date: 2024  Patient name: Severiano Ball  : 1970  MRN: [id number]  Referring provider: Selina Perez,*  Dx:   Encounter Diagnosis     ICD-10-CM    1. Cerebrovascular accident (CVA) due to stenosis of cerebral artery (HCC)  I63.50             Subjective: No new information and no new complaints offered. Ready to work. A little sore after last session.       Objective: See treatment diary below    Gait Belt (supervision/min assist) for all:    HKM 40'x4    Bosu step-ups fwd/lat x20 ea direction    STS repeats w/ 20# TT x30    8\" step ups with 10# DB 15x each side (able to hold in both hands)  Amb full circled timed 7.5# RAW (no HHA)    PB 31.76 seconds   Ambulation w/ vertical head turns 300' x1  Fwd/bwd amb 40' ea (5x laps)   TM (SOLO)    Incline 1% with 7.5# AW on RLE at 2.5-3 mph for 10 mins BUE support     Was not performed:  Amb with increased speed via L HHA, cues for ground clearance/step length 500'x1  Amb with L HHA, 5# AW on RLE, cues speed and ground clearance 500'x1    Assessment: Tolerated first treatment well and continues to perform exercises with proper technique. Had seemingly improved RLE clearance during today's session, intermittent cuing for increased step height throughout session. Session emphasis on improving gait speed, step height, and RPE with no AD or handheld assistance throughout session while performing overground. UE support used during treadmill activities. Intermittent decreased clearance with step ups while holding #10 DB - requiring minAx1 to assist with correction. Does well with demonstration for instruction and cuing with aphasic deficits. Returned to performing treadmill activities for more consistent stepping strategies, with 7.5# RAW - intermittent cuing every minute or so to try and improve clearance of RLE. His clearance drag seems to be with his heel - decreased transition into stance phase to get forward onto his foot in time " for propulsion. Might try further plantarflexor strength to try and improve this push off on R.  Patient demonstrated fatigue post treatment and would benefit from continued PT.       Plan: Continue per plan of care.  Progress treatment as tolerated.       POC expires Unit limit Auth Expiration date PT/OT + Visit Limit? Co-Insurance   10/10/24                                    Visit/Unit Tracking  AUTH Status:  Date 7/10 7/11 7/15 7/16 7/23 7/24           Used                Remaining

## 2024-07-25 ENCOUNTER — APPOINTMENT (OUTPATIENT)
Age: 54
End: 2024-07-25
Payer: COMMERCIAL

## 2024-07-30 ENCOUNTER — APPOINTMENT (OUTPATIENT)
Age: 54
End: 2024-07-30
Payer: COMMERCIAL

## 2024-07-30 DIAGNOSIS — I63.50 CEREBROVASCULAR ACCIDENT (CVA) DUE TO STENOSIS OF CEREBRAL ARTERY (HCC): Primary | ICD-10-CM

## 2024-08-01 ENCOUNTER — OFFICE VISIT (OUTPATIENT)
Age: 54
End: 2024-08-01
Payer: COMMERCIAL

## 2024-08-01 DIAGNOSIS — I63.50 CEREBROVASCULAR ACCIDENT (CVA) DUE TO STENOSIS OF CEREBRAL ARTERY (HCC): Primary | ICD-10-CM

## 2024-08-01 DIAGNOSIS — I63.50 CEREBROVASCULAR ACCIDENT (CVA) DUE TO STENOSIS OF CEREBRAL ARTERY (HCC): ICD-10-CM

## 2024-08-01 DIAGNOSIS — I69.320 APHASIA AS LATE EFFECT OF CEREBROVASCULAR ACCIDENT (CVA): Primary | ICD-10-CM

## 2024-08-01 PROCEDURE — 97112 NEUROMUSCULAR REEDUCATION: CPT

## 2024-08-01 PROCEDURE — 92507 TX SP LANG VOICE COMM INDIV: CPT

## 2024-08-01 NOTE — PROGRESS NOTES
Daily Speech Treatment Note    Today's date: 2024   Patient’s name: Severiano Ball  : 1970  MRN: [id number]  Safety measures: recent CVA w/stent  Referring provider: Selina Perez,*    Encounter Diagnosis     ICD-10-CM    1. 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 Cerebrovascular accident (CVA) due to stenosis of cerebral artery (HCC)  I63.50               Visit Tracking:  POC expires Unit limit Auth Expiration date PT/OT + Visit Limit?   10/10/24                                                 Visit/Unit Tracking  AUTH Status:  Date 7/10  IE  7/11  7/15  7/16  7/23  8/1               yes Used 1  2  3  4  5  6                 Remaining   17 16  15  14   11  10                   Subjective/Behavioral:  Spouse was not present for today's session    Objective/Assessment:  Spontaneous speech increased for today's session in conversational task regarding fishing. However, when clinician asked more information regarding trip, patient had unintelligible speech and speech not pertaining to question due to increased difficulties with comprehension.    Short-term goals:   LISTENING  Patient will follow one-step verbal directions (e.g., close your eyes) with 60% accuracy to facilitate increased auditory comprehension skills.        Patient will answer verbal Yes/No questions with 60% accuracy to facilitate increased auditory comprehension skills.         READING  Patient will facilitate functional reading skills by matching word to picture with 60% accuracy over 5 sessions to facilitate growth of vocabulary concept comprehension, to be achieved in 4-6 weeks.    Matching objects with multiple choice of 4: Task completed in 10/10 opp (90% acc) from min cueing. Patient benefited from review of directions   Matching words in the 2 different  Task completed in 10/10 opp (100% acc) ifrom min cueing. Patient benefited from review of directions.    Once patient comprehended  directions, faded cueing, however, patient could not follow the direction to [place] the matching number/letter.     Patient will facilitate functional reading skills by matching word to phrase/function with 60% accuracy over 5 sessions to facilitate growth of vocabulary concept comprehension, to be achieved in 4-6 weeks.       Patient will facilitate functional reading skills by following one-step written directions (e.g., close your eyes) with 60% accuracy to facilitate increased reading comprehension skills, to be achieved in 4-6 weeks.     Patient will facilitate functional reading skills by naming opposites (e.g., hot and ___) with choice of 3 with 60% accuracy to build expressive vocabulary for conversation, to be achieved in 4-6 weeks.     Patient will facilitate functional reading skills by naming synonyms (e.g., street or ___) with choice of 3 with 60% accuracy to build expressive vocabulary for conversation, to be achieved in 4-6 weeks.     Patient will facilitate functional reading skills by answering written Yes/No questions with 60% accuracy to facilitate increased reading comprehension skills, to be achieved in 4-6 weeks.     Patient will facilitate functional reading skills by providing an appropriate word for sentence completion task (e.g., open the ___) with 60% accuracy to facilitate increased expressive language skills, to be achieved in 4-6 weeks.     WRITING  Patient will complete simple writing tasks at the word level with >60% acc to increase writing skills within FLE, to be achieved in 4-6 weeks.        Patient will improve functional writing skills         SPEAKING  Patient will complete naming of automatic tasks (e.g., counting, listing days of the week/months of the year, etc.) with 60% accuracy to facilitate increased verbal expression skills,     Attempted to review letters A-C with visual aid, however, patient verbalized numbers instead. With max cueing and redirectives, patient could  not verbalize letters. Reviewed numbers 1-8 and patient inconsistently verbalized numbers with max verbal/visual cueing. Noted patient frequently stated numbers unrelated to task. Attempted to review days of the week, after multiple attempts patient was able to list the days of the week once with max verbal models. Only could complete once successfully.    Patient will imitate words/phrases using MADISON/tapping/pacing with 60% accuracy to facilitate increased verbal expression skills,      Significant difficulties imitating clinician's words with MADISON principles.       Patient will name opposite (e.g., hot and ___) with 60% accuracy to build expressive vocabulary for conversation,     Patient will name a synonym (e.g., street or ___) with 60% accuracy to build expressive vocabulary for conversation,     Patient will name appropriate category for three words (e.g., apple, banana, pear = fruits) with 60% accuracy to facilitate improved generative naming skills,     Patient will provide an appropriate word for sentence completion task (e.g., open the ___) with 60% accuracy to facilitate increased expressive language skills,    Patient will provide an adequate response to generative naming task (i.e., name as many…) with 60% accuracy to facilitate increased expressive language skills,      Patient will provide an adequate response to confrontation naming task (i.e., what is…) with 60% accuracy to facilitate increased expressive language skills, t     Patient will name up to 5 features to describe a person/place/thing with 60% accuracy to facilitate improved utilization of circumlocution strategy,     Patient will complete picture description tasks using language organization strategies with 60% accuracy to facilitate improved utilization of circumlocution strategy,     Plan:  -Continue with current plan of care.

## 2024-08-01 NOTE — PROGRESS NOTES
POC expires Auth Status Total   Visits  Start date  Expiration date PT/OT + Visit Limit? Co-Insurance   10/18 Required                                                 Visit/Unit Tracking  AUTH Status: required  Date             Visits  Authed:  Used 1 (IE) 1 1                16               PLAN OF CARE START:24  PLAN OF CARE END: 10/18/2024  PROGRESS NOTE DUE: follow up post scheduling into September   FREQUENCY: 2-3x/week for 8-12 weeks  PRECAUTIONS: global aphasia   DIAGNOSIS: CVA, R side weakness and global aphasia   VISITS: 3 of 18       Daily Note     Today's date: 2024  Patient name: Severiano Ball  : 1970  MRN: [id number]  Referring provider: Selina Perez,*  Dx:   Encounter Diagnosis     ICD-10-CM    1. Cerebrovascular accident (CVA) due to stenosis of cerebral artery (HCC)  I63.50                          Subjective: Reports no changes since last visit       Objective: See treatment below.  NMRE:   *reach,grasp,release of keyhole pegs towel>board using R UE 2 point pinch. No dropping noted, slight under/over shooting     *reach,grasp,release of small perler beads> using R UE to grasp bead and manipulate. Required max A for use fo R UE to grasp bead over L    *reach,grasp,release of small lite brite pieces towel on R side>board using R UE only. Good finger strength noted during task. 15% dropping overall    Assessment: Tolerated treatment well. 2# weight added to R UE for increased challenge to promote muscle recruitment and to provide proprioceptive input for entire session. Benefits from simple task focused commands and visual demo with occasional hand over hand assist secondary to cognitive impairments. Focus of session on strength, motor control, coordination. Pt would benefit from continued OT services to address R UE  strength, pinch strength, FMC, in hand manipulation skills, standing endurance/activity tolerance.       Plan:  Continued skilled OT per POC.

## 2024-08-01 NOTE — PROGRESS NOTES
"Daily Note     Today's date: 2024  Patient name: Severiano Ball  : 1970  MRN: [id number]  Referring provider: Selina Perez,*  Dx:   Encounter Diagnosis     ICD-10-CM    1. Cerebrovascular accident (CVA) due to stenosis of cerebral artery (HCC)  I63.50             Subjective: No new information and no new complaints offered. Ready to work.      Objective: See treatment diary below    Gait Belt (supervision/min assist) for all:  - HKM 40'x4  - Squats with 6# med ball, 2x10  - Amb with verbal cues for high speed 300'x1  - Amb with 20# TT resisted by red band 300'x1  - Pushing person in wheelchair with resistance provided 200'x2  - Pulling person in wheelchair with no resistance 200'x1  - Tandem amb 100'x1  - Light jog 300'x1  - Ambulation w/ vertical head turns 300' x1  - 6\" step up with high knee, 7.5# AW bilaterally, 0-1 UE support    Assessment: Tolerated first treatment well and continues to perform exercises with proper technique. Demonstrations are very helpful. Had seemingly improved RLE clearance during today's session. Good tolerance for jogging and increasing amb speed. He is doing well with functional activities and needs rest infrequently. He does have some balance deficits, but this appears to be improving as noted by no LOB this session. Patient demonstrated fatigue post treatment and would benefit from continued PT.       Plan: Continue per plan of care.  Progress treatment as tolerated.       POC expires Unit limit Auth Expiration date PT/OT + Visit Limit? Co-Insurance   10/10/24                                    Visit/Unit Tracking  AUTH Status:  Date 7/10 7/11 7/15 7/16 7/23 7/24  8/1         Used                Remaining                       "

## 2024-08-05 ENCOUNTER — OFFICE VISIT (OUTPATIENT)
Age: 54
End: 2024-08-05
Payer: COMMERCIAL

## 2024-08-05 DIAGNOSIS — I69.320 APHASIA AS LATE EFFECT OF CEREBROVASCULAR ACCIDENT (CVA): Primary | ICD-10-CM

## 2024-08-05 DIAGNOSIS — I63.50 CEREBROVASCULAR ACCIDENT (CVA) DUE TO STENOSIS OF CEREBRAL ARTERY (HCC): ICD-10-CM

## 2024-08-05 DIAGNOSIS — I63.50 CEREBROVASCULAR ACCIDENT (CVA) DUE TO STENOSIS OF CEREBRAL ARTERY (HCC): Primary | ICD-10-CM

## 2024-08-05 DIAGNOSIS — R26.9 NEUROLOGIC GAIT DISORDER: ICD-10-CM

## 2024-08-05 PROCEDURE — 97112 NEUROMUSCULAR REEDUCATION: CPT

## 2024-08-05 PROCEDURE — 92507 TX SP LANG VOICE COMM INDIV: CPT

## 2024-08-05 NOTE — PROGRESS NOTES
"Daily Speech Treatment Note    Today's date: 2024   Patient’s name: Severiano Ball  : 1970  MRN: [id number]  Safety measures: recent CVA w/stent  Referring provider: Selina Perez,*    Encounter Diagnosis     ICD-10-CM    1. 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 Cerebrovascular accident (CVA) due to stenosis of cerebral artery (HCC)  I63.50             Visit Tracking:  POC expires Unit limit Auth Expiration date PT/OT + Visit Limit?   10/10/24    2024  18                                           Visit/Unit Tracking  AUTH Status:  Date 7/10  IE  7/11  7/15  7/16  7/23  8/1  8/5             yes Used 1  2  3  4  5  6  7               Remaining   17 16  15  14   13  12  11                 Subjective/Behavioral:  Spouse was not present for today's session    Objective/Assessment:  Spontaneous speech: \"that's stupid\" & \"it's this one\"    Short-term goals:   LISTENING  Patient will follow one-step verbal directions (e.g., close your eyes) with 60% accuracy to facilitate increased auditory comprehension skills.        Patient will answer verbal Yes/No questions with 60% accuracy to facilitate increased auditory comprehension skills.         READING  Patient will facilitate functional reading skills by matching word to picture with 60% accuracy over 5 sessions to facilitate growth of vocabulary concept comprehension, to be achieved in 4-6 weeks.    Word ID without pictures: 100% accuracy independently  Word ID with pictures: 100% accuracy independently  Word matching without pictures: 100% accuracy independnetly    Matching picture to word (choice of 2): Task completed with 60% accuracy from max cueing. Patient benefited from repetition and tactile cueing.  Suspected difficulties with the less concrete pictures were. Difficulties noted with identifying emotions.     Patient will facilitate functional reading skills by matching word to phrase/function with 60% " accuracy over 5 sessions to facilitate growth of vocabulary concept comprehension, to be achieved in 4-6 weeks.    Matching word to same word with multiple choice of 3 (visual and verbal cues of words provided): Task completed 70% accuracy with provided cues in task.  Matching sentences with multiple choice of 3 (visual/verbal cueing provided within task): Task completed with 90% accuracy with provided cues in task         Patient will facilitate functional reading skills by following one-step written directions (e.g., close your eyes) with 60% accuracy to facilitate increased reading comprehension skills, to be achieved in 4-6 weeks.     Patient will facilitate functional reading skills by naming opposites (e.g., hot and ___) with choice of 3 with 60% accuracy to build expressive vocabulary for conversation, to be achieved in 4-6 weeks.     Patient will facilitate functional reading skills by naming synonyms (e.g., street or ___) with choice of 3 with 60% accuracy to build expressive vocabulary for conversation, to be achieved in 4-6 weeks.     Patient will facilitate functional reading skills by answering written Yes/No questions with 60% accuracy to facilitate increased reading comprehension skills, to be achieved in 4-6 weeks.     Patient will facilitate functional reading skills by providing an appropriate word for sentence completion task (e.g., open the ___) with 60% accuracy to facilitate increased expressive language skills, to be achieved in 4-6 weeks.    Sentence completion task: patient was provided visual/verbal sentence stimuli (open the ___) and had to complete the sentence with multiple choice of 3. Task completing with 50% accuracy with max cueing. Patient benefited from repetition and tactile cueing.  Suspected patient was guessing due to accuracy of task.       WRITING  Patient will complete simple writing tasks at the word level with >60% acc to increase writing skills within FLE, to be achieved  in 4-6 weeks.        Patient will improve functional writing skills         SPEAKING  Patient will complete naming of automatic tasks (e.g., counting, listing days of the week/months of the year, etc.) with 60% accuracy to facilitate increased verbal expression skills,       Patient will imitate words/phrases using MADISON/tapping/pacing with 60% accuracy to facilitate increased verbal expression skills,            Patient will name opposite (e.g., hot and ___) with 60% accuracy to build expressive vocabulary for conversation,     Patient will name a synonym (e.g., street or ___) with 60% accuracy to build expressive vocabulary for conversation,     Patient will name appropriate category for three words (e.g., apple, banana, pear = fruits) with 60% accuracy to facilitate improved generative naming skills,     Patient will provide an appropriate word for sentence completion task (e.g., open the ___) with 60% accuracy to facilitate increased expressive language skills,    Patient will provide an adequate response to generative naming task (i.e., name as many…) with 60% accuracy to facilitate increased expressive language skills,      Patient will provide an adequate response to confrontation naming task (i.e., what is…) with 60% accuracy to facilitate increased expressive language skills, t     Patient will name up to 5 features to describe a person/place/thing with 60% accuracy to facilitate improved utilization of circumlocution strategy,     Patient will complete picture description tasks using language organization strategies with 60% accuracy to facilitate improved utilization of circumlocution strategy,     Plan:  -Continue with current plan of care.

## 2024-08-05 NOTE — PROGRESS NOTES
"Daily Note     Today's date: 2024  Patient name: Severiano Ball  : 1970  MRN: [id number]  Referring provider: Selina Perez,*  Dx:   Encounter Diagnosis     ICD-10-CM    1. Cerebrovascular accident (CVA) due to stenosis of cerebral artery (HCC)  I63.50       2. 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 SLP reports patient with dense global aphasia and to supplement communication with visual demo. Patient in no apparent distress. Agreeable to PT.       Objective: See treatment diary below. Constant visual/ verbal cues- one step commands only t/o session.    Vitals pre /76, HR 52 BPM, O2 sat 96%  Post /78, HR 60 BPM, O2 st 96%    Gait Belt (supervision/min assist) for all:  - HKM 40'x4  - Squats with 6# med ball, 2x10  - Amb with verbal cues for high speed 300'x1  - Amb with 20# TT resisted by red band 300'x1  - Pushing person in wheelchair with resistance provided 200'x2- NT  - Pulling person in stool with resistance/ perturbation 300'x1  - Tandem amb 100'x1  - Light jog 300'x1  - Ambulation w/ vertical head turns 300' x1  - 6\" step up with high knee, 7.5# AW bilaterally, 0-1 UE support 12x B/L  - 300 ft ambulation 7.5# AW B/L 10#TT  - Stand blue foam 10# TT trunk twist/ lift overhead x 15     Assessment: Tolerated first treatment well and continues to perform exercises with proper technique. Follows 1 step commands with visual cues consistently. Follows 1 step commands only about 50-60% of time. Able to progress difficulty of ex's without complaint. Patient demonstrated fatigue post treatment and would benefit from continued PT.       Plan: Continue per plan of care.  Progress treatment as tolerated.       POC expires Unit limit Auth Expiration date PT/OT + Visit Limit? Co-Insurance   10/10/24                                    Visit/Unit Tracking  AUTH Status:  Date 7/10 7/11 7/15 7/16 7/23 7/24  8/1 8/5        Used                Remaining           "

## 2024-08-06 ENCOUNTER — OFFICE VISIT (OUTPATIENT)
Age: 54
End: 2024-08-06
Payer: COMMERCIAL

## 2024-08-06 DIAGNOSIS — R26.9 NEUROLOGIC GAIT DISORDER: ICD-10-CM

## 2024-08-06 DIAGNOSIS — I63.50 CEREBROVASCULAR ACCIDENT (CVA) DUE TO STENOSIS OF CEREBRAL ARTERY (HCC): ICD-10-CM

## 2024-08-06 DIAGNOSIS — I63.50 CEREBROVASCULAR ACCIDENT (CVA) DUE TO STENOSIS OF CEREBRAL ARTERY (HCC): Primary | ICD-10-CM

## 2024-08-06 DIAGNOSIS — I69.320 APHASIA AS LATE EFFECT OF CEREBROVASCULAR ACCIDENT (CVA): Primary | ICD-10-CM

## 2024-08-06 PROCEDURE — 97112 NEUROMUSCULAR REEDUCATION: CPT

## 2024-08-06 PROCEDURE — 92507 TX SP LANG VOICE COMM INDIV: CPT

## 2024-08-06 NOTE — PROGRESS NOTES
POC expires Auth Status Total   Visits  Start date  Expiration date PT/OT + Visit Limit? Co-Insurance   10/18 Required                                                 Visit/Unit Tracking  AUTH Status: required  Date            Visits  Authed:  Used 1 (IE) 1 1 1               16               PLAN OF CARE START:24  PLAN OF CARE END: 10/18/2024  PROGRESS NOTE DUE: follow up post scheduling into September   FREQUENCY: 2-3x/week for 8-12 weeks  PRECAUTIONS: global aphasia   DIAGNOSIS: CVA, R side weakness and global aphasia   VISITS:        Daily Note     Today's date: 2024  Patient name: Seveirano Ball  : 1970  MRN: [id number]  Referring provider: Selina Perez,*  Dx:   Encounter Diagnosis     ICD-10-CM    1. Cerebrovascular accident (CVA) due to stenosis of cerebral artery (HCC)  I63.50                          Subjective: Reports no changes since last visit.       Objective: See treatment below.    NMRE:   *Pt completed reaching for various resistive pincers with 3 point pinch utilizing R UE and transferring to vertical dowel tabletop. Focus on FMC, reaching, grasp, release. 2 lb weight donned on pts R UE wrist to increase challenge and to provide proprioceptive input. Pt benefiting from [facility] assistance to initiate activity 2* aphasia.     *Pt completed [employer] pegboard activity seated tabletop. 2 lb wrist weight added to R UE to promote muscle recruitment and for increased proprioceptive input to R side. Pt instructed to use palm to tip translation, however was not receptive to this technique 2* receptive aphasia. Completed ~25% reps with this technique. Otherwise tolerating well and demo G R UE FMC and target accuracy. Upgraded to having pt remove pieces with tweezers.       Assessment: Tolerated treatment well. 2# weight added to R UE for increased challenge to promote muscle recruitment and to provide proprioceptive input for entire session.  Trialed written commands to try to facilitate direction following impacted by pts condition, however this was not successful. Continuously benefits from simple task focused commands and visual demo with occasional hand over hand assist secondary to cognitive impairments. Focus of session on strength, motor control, coordination. Pt would benefit from continued OT services to address R UE  strength, pinch strength, FMC, in hand manipulation skills, standing endurance/activity tolerance.       Plan: Continued skilled OT per POC.

## 2024-08-06 NOTE — PROGRESS NOTES
"Daily Speech Treatment Note    Today's date: 2024   Patient’s name: Severiano Ball  : 1970  MRN: [id number]  Safety measures: recent CVA w/stent  Referring provider: Selina Perez,*    Encounter Diagnosis     ICD-10-CM    1. 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 Cerebrovascular accident (CVA) due to stenosis of cerebral artery (HCC)  I63.50               Visit Tracking:  POC expires Unit limit Auth Expiration date PT/OT + Visit Limit?   10/10/24    2024  18                                           Visit/Unit Tracking  AUTH Status:  Date 7/10  IE  7/11  7/15  7/16  7/23 7/24  8/1  8/5  8/6           yes Used 1  2  3  4  5 6  7  8  9             Remaining   17 16  15  14   13 12  11  10  9               Subjective/Behavioral:  Spouse was not present for today's session    Objective/Assessment:Noted patient strengths are matching and tracing. Provided tracing for HEP and discussed with spouse to provide phonemic cues for each letter.     Short-term goals:   LISTENING  Patient will follow one-step verbal directions (e.g., close your eyes) with 60% accuracy to facilitate increased auditory comprehension skills.    Patient was given 1 step directions to provide his name. With max cueing, patient was able to state \"My name is Severiano\" 1x. Patient was unable to complete multiple times. Suspected due to impaired comprehension. Patient benefited from verbal models, written models, gestures to written models, MADISON principles.     Patient will answer verbal Yes/No questions with 60% accuracy to facilitate increased auditory comprehension skills.         READING  Patient will facilitate functional reading skills by matching word to picture with 60% accuracy over 5 sessions to facilitate growth of vocabulary concept comprehension    Patient will facilitate functional reading skills by matching word to phrase/function with 60% accuracy over 5 sessions to facilitate " growth of vocabulary concept comprehension     Patient will facilitate functional reading skills by following one-step written directions (e.g., close your eyes) with 60% accuracy to facilitate increased reading comprehension skills,      Patient will facilitate functional reading skills by naming opposites (e.g., hot and ___) with choice of 3 with 60% accuracy to build expressive vocabulary for conversation,      Patient will facilitate functional reading skills by naming synonyms (e.g., street or ___) with choice of 3 with 60% accuracy to build expressive vocabulary for conversation,      Patient will facilitate functional reading skills by answering written Yes/No questions with 60% accuracy to facilitate increased reading comprehension skills     Patient will facilitate functional reading skills by providing an appropriate word for sentence completion task (e.g., open the ___) with 60% accuracy to facilitate increased expressive language skills,       WRITING  Patient will complete simple writing tasks at the word level with >60% acc to increase writing skills within FLE, to be achieved in 4-6 weeks.    With visual model, patient to copy the 2-3 letter word. Patient was able to copy words with 100% accuracy from max cueing. Could not complete independently.Patient benefited from tactile cueing, hand over hand writing, tracing letters. Attempted to have patient verbalize words and repeat the letters. Patient inconsistently verbalized words/letters, however, suspected due to significant impairment of comprehension of directions.     With max visual and tactile cues, patient wrote first and last name. Unable to verbalize with max cueing.       Patient will improve functional writing skills         SPEAKING  Patient will complete naming of automatic tasks (e.g., counting, listing days of the week/months of the year, etc.) with 60% accuracy to facilitate increased verbal expression skills,       Patient will imitate  words/phrases using MADISON/tapping/pacing with 60% accuracy to facilitate increased verbal expression skills,            Patient will name opposite (e.g., hot and ___) with 60% accuracy to build expressive vocabulary for conversation,     Patient will name a synonym (e.g., street or ___) with 60% accuracy to build expressive vocabulary for conversation,     Patient will name appropriate category for three words (e.g., apple, banana, pear = fruits) with 60% accuracy to facilitate improved generative naming skills,     Patient will provide an appropriate word for sentence completion task (e.g., open the ___) with 60% accuracy to facilitate increased expressive language skills,    Patient will provide an adequate response to generative naming task (i.e., name as many…) with 60% accuracy to facilitate increased expressive language skills,      Patient will provide an adequate response to confrontation naming task (i.e., what is…) with 60% accuracy to facilitate increased expressive language skills, t     Patient will name up to 5 features to describe a person/place/thing with 60% accuracy to facilitate improved utilization of circumlocution strategy,     Patient will complete picture description tasks using language organization strategies with 60% accuracy to facilitate improved utilization of circumlocution strategy,     Plan:  -Patient was provided with home exercises/activities to target goals in plan of care at the end of today's session.  -Continue with current plan of care.

## 2024-08-06 NOTE — PROGRESS NOTES
"Daily Note     Today's date: 2024  Patient name: Severiano Ball  : 1970  MRN: [id number]  Referring provider: Selina Perez,*  Dx:   Encounter Diagnosis     ICD-10-CM    1. Cerebrovascular accident (CVA) due to stenosis of cerebral artery (HCC)  I63.50       2. 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 SLP reports patient with dense global aphasia and to supplement communication with visual demo. Patient in no apparent distress. Agreeable to PT.       Objective: See treatment diary below. Constant visual/ verbal cues- one step commands only t/o session.    Vitals pre /76, HR 52 BPM, O2 sat 96%  Post /78, HR 60 BPM, O2 st 96%    Gait Belt (supervision/min assist) for all:  - HKM 40'x4 with 10# TT hold  - Squats with 10# TT hold x25  - Amb with verbal cues for high speed 300'x3  - FF stairs (26) alt sequence / every-other stair 3 cycles   First cycle: alt step   Second - third cycles: every other step in alt sequence  - Pushing person in wheelchair with resistance provided 300'  - Speed amb with B TB resistance 300'  - Bwd amb with B TB resistance + 10# TT hold 300'  - Light jog 300'x2  - Tandem amb [id number]'x1  -TM (SOLO)    Incline 5% with 5# AW on RLE at 2.5mph for 5 mins BUE support     NOT TODAY -   - Amb with 20# TT resisted by red band 300'x1  - Pulling person in stool with resistance/ perturbation 300'x1  - Ambulation w/ vertical head turns 300' x1  - 6\" step up with high knee, 7.5# AW bilaterally, 0-1 UE support 12x B/L  - 300 ft ambulation 7.5# AW B/L 10#TT  - Stand blue foam 10# TT trunk twist/ lift overhead x 15     Assessment: Tolerated first treatment well and continues to perform exercises with proper technique. Follows 1 step commands with visual cues consistently. Follows 1 step commands only about 50-60% of time. Able to progress difficulty of ex's without complaint. Patient demonstrated fatigue post treatment and would benefit from " continued PT.       Plan: Continue per plan of care.  Progress treatment as tolerated.       POC expires Unit limit Auth Expiration date PT/OT + Visit Limit? Co-Insurance   10/10/24                                    Visit/Unit Tracking  AUTH Status:  Date 7/10 7/11 7/15 7/16 7/23 7/24  8/1 8/5        Used                Remaining

## 2024-08-08 ENCOUNTER — OFFICE VISIT (OUTPATIENT)
Age: 54
End: 2024-08-08
Payer: COMMERCIAL

## 2024-08-08 ENCOUNTER — EVALUATION (OUTPATIENT)
Age: 54
End: 2024-08-08
Payer: COMMERCIAL

## 2024-08-08 DIAGNOSIS — R26.9 NEUROLOGIC GAIT DISORDER: ICD-10-CM

## 2024-08-08 DIAGNOSIS — I63.50 CEREBROVASCULAR ACCIDENT (CVA) DUE TO STENOSIS OF CEREBRAL ARTERY (HCC): ICD-10-CM

## 2024-08-08 DIAGNOSIS — I63.50 CEREBROVASCULAR ACCIDENT (CVA) DUE TO STENOSIS OF CEREBRAL ARTERY (HCC): Primary | ICD-10-CM

## 2024-08-08 DIAGNOSIS — I69.320 APHASIA AS LATE EFFECT OF CEREBROVASCULAR ACCIDENT (CVA): Primary | ICD-10-CM

## 2024-08-08 PROCEDURE — 97112 NEUROMUSCULAR REEDUCATION: CPT

## 2024-08-08 PROCEDURE — 92507 TX SP LANG VOICE COMM INDIV: CPT

## 2024-08-08 NOTE — PROGRESS NOTES
"Daily Note     Today's date: 2024  Patient name: Severiano Ball  : 1970  MRN: [id number]  Referring provider: Selina Perez,*  Dx:   Encounter Diagnosis     ICD-10-CM    1. Cerebrovascular accident (CVA) due to stenosis of cerebral artery (HCC)  I63.50       2. Neurologic gait disorder  R26.9             Subjective: Presents post OT. Patient in no apparent distress. Agreeable to PT. Pleasant t/o session.       Objective: See treatment diary below. Visual/ verbal cues at start of each exercise and occasionally t/o- one step commands only t/o session.    Vitals pre /76, HR 52 BPM, O2 sat 96%  Post /78, HR 60 BPM, O2 st 96%    Gait Belt (supervision/min assist) for all:   - Hurdles w/ foam in between fwd/lat 40'x4 ea with 10# TT hold  - Squats with 10# TT OH press hold x30  - Amb with verbal cues for high speed 300'x3  - FF stairs (26) alt sequence / every-other stair 3 cycles   First cycle: alt step   Second - third cycles: every other step in alt sequence  - Karaoke w/ 10# TT x3 laps  - Speed amb with B TB resistance 300'  - Light jog 300'x2  - Tandem amb fwd/bak325'x2 10# TT  - Attempted: jumping jacks (completes w/o jump) x30  - Attempted: modified mountain climbers on // bars (unable to achieve desired speed) x30  - Attempted: STS w/ vertical jump and vertical jump in // bars - pt declined jumping shaking head no and laughing at request  -TM (SOLO)    Incline 7% with 5# AW on RLE at 2.5mph for 5 mins BUE support     NOT TODAY -   - Amb with 20# TT resisted by red band 300'x1  - Pulling person in stool with resistance/ perturbation 300'x1  - Ambulation w/ vertical head turns 300' x1  - 6\" step up with high knee, 7.5# AW bilaterally, 0-1 UE support 12x B/L  - 300 ft ambulation 7.5# AW B/L 10#TT  - Stand blue foam 10# TT trunk twist/ lift overhead x 15     Assessment: Tolerated first treatment well and continues to perform exercises with proper technique. Follows 1 step commands with " visual cues consistently. Continues to demonstrate difficulty w/ lateral movements, although improved than when last seen by this PT. Attempted to progress to jumping tasks as pt is able to jog successfully but pt declined w/ head shake and laughing each time PT encouraged attempting. Patient demonstrated fatigue post treatment and would benefit from continued PT.       Plan: Continue per plan of care.  Progress treatment as tolerated.       POC expires Unit limit Auth Expiration date PT/OT + Visit Limit? Co-Insurance   10/10/24                                    Visit/Unit Tracking  AUTH Status:  Date 7/10 7/11 7/15 7/16 7/23 7/24  8/1 8/5 8/8       Used                Remaining

## 2024-08-08 NOTE — PROGRESS NOTES
Speech-Language Pathology Re-Evaluation    Today's date: 2024   Patient’s name: Severiano Ball  : 1970  MRN: [id number]  Safety measures: acute cva  Referring provider: Selina Perez,*    Encounter Diagnosis     ICD-10-CM    1. 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 Cerebrovascular accident (CVA) due to stenosis of cerebral artery (HCC)  I63.50           Assessment:    Patient CONTINUES present with severe global aphasia c/b or secondary to acute CVA. Patient continues to have minimal expressive language, however, has increased spontaneous speech in conversation since IE! However, spontaneous speech tends to be unrelated to conversation. Patient demonstrates strengths in matching pictures, words, and sentences. Patient has increased graphic expression skills. However, receptive language is still significant impaired and will continue to address in sessions.Patient would benefit from continued outpatient skilled Speech Therapy services to maximize expressive/receptive language skills after acute VCA. Will benefit to continue 3x week for 45 mins sessions to maximize acute healing phase of recovery. OP ST services continue  to target to improve with current speech-language skills, promote positive communication interactions with both familiar & unfamiliar listeners, further education/training compensatory strategies, increase communication of wants/needs, expressive/receptive language within functional activities, participate in meaningful activities, allow for increased socialization, promote safety, facilitate overall improved quality of life, reduce caregiver burden and complete caregiver education/training. Patient is a good candidate for OP ST services.    Short Term Goals    LISTENING  Patient will follow one-step verbal directions (e.g., close your eyes) with 60% accuracy to facilitate increased auditory comprehension skills, ONGOING     Patient will  answer verbal Yes/No questions with 60% accuracy to facilitate increased auditory comprehension skills, ONGOING     READING  Patient will facilitate functional reading skills by matching word to picture with 60% accuracy over 5 sessions to facilitate growth of vocabulary concept comprehension,ONGOING     Patient will facilitate functional reading skills by matching word to phrase/function with 60% accuracy over 5 sessions to facilitate growth of vocabulary concept comprehension, ONGOING     Patient will facilitate functional reading skills by following one-step written directions (e.g., close your eyes) with 60% accuracy to facilitate increased reading comprehension skills, ONGOING     Patient will facilitate functional reading skills by naming opposites (e.g., hot and ___) with choice of 3 with 60% accuracy to build expressive vocabulary for conversation, ONGOING     Patient will facilitate functional reading skills by naming synonyms (e.g., street or ___) with choice of 3 with 60% accuracy to build expressive vocabulary for conversation,ONGOING     Patient will facilitate functional reading skills by answering written Yes/No questions with 60% accuracy to facilitate increased reading comprehension skills,ONGOING      Patient will facilitate functional reading skills by providing an appropriate word for sentence completion task (e.g., open the ___) with 60% accuracy to facilitate increased expressive language skills,ONGOING     WRITING  Patient will complete simple writing tasks at the word level with >60% acc to increase writing skills within FLE, ONGOING  Patient will improve functional writing skills ONGOING  SPEAKING  Patient will complete naming of automatic tasks (e.g., counting, listing days of the week/months of the year, etc.) with 60% accuracy to facilitate increased verbal expression skills, ONGOING  Patient will imitate words/phrases using [place]/tapping/pacing with 60% accuracy to facilitate increased  verbal expression skills, ONGOING      Patient will name opposite (e.g., hot and ___) with 60% accuracy to build expressive vocabulary for conversation, DID NOT ADDRESS YET  Patient will name a synonym (e.g., street or ___) with 60% accuracy to build expressive vocabulary for conversation, DID NOT ADDRESS YET     Patient will name appropriate category for three words (e.g., apple, banana, pear = fruits) with 60% accuracy to facilitate improved generative naming skills,DID NOT ADDRESS YET     Patient will provide an appropriate word for sentence completion task (e.g., open the ___) with 60% accuracy to facilitate increased expressive language skills,DID NOT ADDRESS YET     Patient will provide an adequate response to generative naming task (i.e., name as many…) with 60% accuracy to facilitate increased expressive language skills, DID NOT ADDRESS YET     Patient will provide an adequate response to confrontation naming task (i.e., what is…) with 60% accuracy to facilitate increased expressive language skills, DID NOT ADDRESS YET     Patient will name up to 5 features to describe a person/place/thing with 60% accuracy to facilitate improved utilization of circumlocution strategy, DID NOT ADDRESS YET     Patient will complete picture description tasks using language organization strategies with 60% accuracy to facilitate improved utilization of circumlocution strategy, DID NOT ADDRESS YET    Long Term    Patient will demonstrate improved expressive and receptive language skills during structured and unstructured tasks by discharge.  ONGOING     Patient will improve ability to facilitate communication to meet needs including use of compensatory strategies to promote meaningful interactions for improved quality of life and maximize level of independence. ONGOING       Plan:  Patient would benefit from outpatient skilled Speech Therapy services: Speech-language therapy    Frequency: 3x weekly  Duration: 3  "months    Intervention certification from: 7/10/2024  Intervention certification to: 10/10/2024      Subjective:  Patient reported no new concerns for today's session.      Patient's goal(s): per spouse \"to speak\"      Objective (testing):  Progress update no testing for today's session.       Treatment:  Patient was given tangible objects from the Language Activity Resource Kit (LARK) and given the task to write the object from written cue. Patient initially had significant difficulties following directions and required max hand over hand tracing of words to aid with comprehension. Patient had difficulties comprehending certain letters from written model such as r and u. Patient wrote 11 words 2x each with utilization of written cues  and phonemic cues. Patient was verbally prompted to verbalize words from verbal models, carrier phrases, simplifying the directions,  and repetition of verbal models. Patient verbalized 2 words out of 11 and was unable to repeat the verbalized words more than once.      Visit Tracking:  POC expires Unit limit Auth Expiration date PT/OT + Visit Limit?   10/10/24    9/13/2024  18                                           Visit/Unit Tracking  AUTH Status:  Date 7/10  IE  7/11  7/15  7/16  7/23 7/24  8/1  8/5  8/6           yes Used 1  2  3  4  5 6  7  8  9             Remaining   17 16  15  14   13 12  11  10  9               Intervention comments:  45 mins of speech allison black  "

## 2024-08-08 NOTE — PROGRESS NOTES
POC expires Auth Status Total   Visits  Start date  Expiration date PT/OT + Visit Limit? Co-Insurance   10/18 Required                                                 Visit/Unit Tracking  AUTH Status: required  Date           Visits  Authed:  Used 1 (IE) 1 1 1 1           Remaining   16 15 14 13            PLAN OF CARE START:24  PLAN OF CARE END: 10/18/2024  PROGRESS NOTE DUE: follow up post scheduling into September   FREQUENCY: 2-3x/week for 8-12 weeks  PRECAUTIONS: global aphasia   DIAGNOSIS: CVA, R side weakness and global aphasia   VISITS: 5 of 18       Daily Note     Today's date: 2024  Patient name: Severiano Ball  : 1970  MRN: [id number]  Referring provider: Selina Perez,*  Dx:   Encounter Diagnosis     ICD-10-CM    1. Cerebrovascular accident (CVA) due to stenosis of cerebral artery (HCC)  I63.50                      Subjective: Reports no changes since last visit.       Objective: See treatment below.    NMRE:   *Seated tabletop - pt completed placing marbles into small mouthed cones with red pincers using R UE. 2 lb wrist weight added to R UE to promote muscle recruitment and for increased proprioceptive input to R side. Activity focused on R UE FMC, in hand manipulation, and target accuracy. Pt demo multiple drops and needed 2-3 visual demos to comprehend use of pincers 2* global aphasia.    *Pt completed small Rodrigo pegboard activity seated tabletop with R UE 2 lb wrist donned for inc proprioception. Pegs placed on elevated surface on pts R side for increased challenge. Pt instructed to retrieve small pegs and place into pegboard. Activity focusing on R UE FMC, UE strength and endurance, and proprioception. Pt tolerated well with min drops and G task endurance, no rest breaks noted. Upgraded to having pt place a singular perler bead onto each peg.       Assessment: Tolerated treatment well. 2# weight added to R UE for increased challenge to  promote muscle recruitment and to provide proprioceptive input for entire session. Continuously benefits from simple task focused commands and visual demo with occasional hand over hand assist secondary to cognitive impairments. Focus of session on strength, motor control, coordination. Pt would benefit from continued OT services to address R UE  strength, pinch strength, FMC, in hand manipulation skills, standing endurance/activity tolerance.       Plan: Continued skilled OT per POC.

## 2024-08-12 ENCOUNTER — OFFICE VISIT (OUTPATIENT)
Age: 54
End: 2024-08-12
Payer: COMMERCIAL

## 2024-08-12 ENCOUNTER — EVALUATION (OUTPATIENT)
Age: 54
End: 2024-08-12
Payer: COMMERCIAL

## 2024-08-12 DIAGNOSIS — I69.320 APHASIA AS LATE EFFECT OF CEREBROVASCULAR ACCIDENT (CVA): Primary | ICD-10-CM

## 2024-08-12 DIAGNOSIS — I63.50 CEREBROVASCULAR ACCIDENT (CVA) DUE TO STENOSIS OF CEREBRAL ARTERY (HCC): Primary | ICD-10-CM

## 2024-08-12 DIAGNOSIS — R26.9 NEUROLOGIC GAIT DISORDER: ICD-10-CM

## 2024-08-12 DIAGNOSIS — I63.50 CEREBROVASCULAR ACCIDENT (CVA) DUE TO STENOSIS OF CEREBRAL ARTERY (HCC): ICD-10-CM

## 2024-08-12 PROCEDURE — 97112 NEUROMUSCULAR REEDUCATION: CPT

## 2024-08-12 PROCEDURE — 92507 TX SP LANG VOICE COMM INDIV: CPT

## 2024-08-12 PROCEDURE — 97530 THERAPEUTIC ACTIVITIES: CPT

## 2024-08-12 NOTE — PROGRESS NOTES
PT Re-Evaluation / Discharge Note       POC expires Unit limit Auth Expiration date PT/OT + Visit Limit? Co-Insurance   10/10/24                                   Visit/Unit Tracking  AUTH Status:  Date 7/10 7/11 7/15 7/16 7/23 7/24  8/1 8/5 8/8 8/12 RE      Used                Remaining                         Today's date: 2024  Patient name: Severiano Ball  : 1970  MRN: [id number]  Referring provider: Selina Perez,*  Dx:   Encounter Diagnosis     ICD-10-CM    1. Cerebrovascular accident (CVA) due to stenosis of cerebral artery (HCC)  I63.50       2. Neurologic gait disorder  R26.9             Assessment  Assessment details: Patient is a 54 y.o. Male who presents to skilled outpatient PT after recent CVA. Deficits include global aphasia, which limits accuracy with subjective and requires inc assist with assessment. Since re-evaluation/progress note, patient demonstrates improvements on OM as now able to perform 5xSTS and TUG, with scores below age- and gender- compared norms indicating normal functional transfers/mobility. Clinically significant change noted, with MDC improvements on 10mWT indicating clinically significant improvements with gait speed (1.22 m/s to 1.45 m/s), 6MWT (1200 to 1575 ft) indicating enhanced CV endurance, and on FGA ( to ) indicating enhanced dynamic balance. OM no longer suggest patient is at an increased risk of falls. No fatigue and/or SOB noted with remainder of session's focus on HIGT.  At this date, patient will be discharged from skilled PT services. Educated wife and patient on POC; both in agreement and without questions.     Impairments: Abnormal coordination, Abnormal gait, Abnormal muscle tone, Abnormal or restricted ROM, Activity intolerance, Impaired balance, Impaired physical strength, Lacks appropriate HEP, Poor posture, Poor body mechanics, Pain with function, Safety  issue, Weight-bearing intolerance, Abnormal movement, Difficulty understanding, Abnormal muscle firing  Understanding of Dx/Px/POC: Good  Prognosis: Good      Patient verbalized understanding of POC.         Please contact me if you have any questions or recommendations. Thank you for the referral and the opportunity to share in Severiano Ball's care.        Plan  Plan details: discharge from skilled PT services  Patient would benefit from:   Planned modality interventions: Biofeedback, Cryotherapy, TENS, Thermotherapy  Planned therapy interventions: Abdominal trunk stabilization, ADL training, Balance, Balance/WB training, Breathing training, Body mechanics training, Coordination, Functional ROM exercises, Gait training, HEP, Joint Mobilization, Manual Therapy, Fine taping, Motor coordination training, Neuromuscular re-education, Patient education, Postural training, Strengthening, Stretching, Therapeutic activities, Therapeutic exercises, Therapeutic training, Transfer training, Activity modification, Work reintegration        Goals  Short Term Goals (4 weeks):    - Patient will improve scoring on FGA by 4 points to progress safety with dynamic tasks MET  - Patient will be able to complete additional outcome measures including 5xSTS and TUGMET  - Patient will follow 1 step commands 50% of the timeMET  - Patient will be independent in basic HEP 2-3 weeksMET    Long Term Goals (12 weeks): not able to assess open goals; will be discharged  - Patient will be independent in a comprehensive home exercise program  - Patient will improve 5xSTS score by 2.3 seconds to promote improved LE functional strength needed for ADLs  - Patient will improve gait speed by 0.18 m/s to improve safety with community ambulationMET  - Patient will be able to demonstrate HT in gait without veering  - Patient will improve 6 Minute Walk Test score by 190 feet to promote improved cardiovascular enduranceMET  - Patient will report 50%  reduction in near falls in order to improve safety with functional tasks and reduce his risk for fallsMET  - Patient will report going on walks at least 3 days per week to promote independence and improved cardiovascular enduranceMET  - Patient will be able to ascend/descend stairs reciprocally without UE assist to promote independence and safety with ADLsMET  - Patient will report 50% reduction in near falls when ambulating on uneven terrainMET  - Patient will improve time on TUG by 2.9 seconds to facilitate improved safety in all ambulation      Cut off score  All date taken from [facility] Neuro Section or Rehab Measures      Cope:  Shorty et al., 2018  MDC: 2.7 pts    Vitor et al., 2011  Cut-off score: 45/56    Chronic CVA  < 44/56 high risk for falls (Shorty et al., 2018)  < 47.5/56 slow walker status (Lanette et al., 2011) 5xSTS: Tae 2010  MDC: 2.3 sec  Age Norms:  60-69: 11.1 sec  70-79: 12.6 sec  80-89: 14.8 sec    Eliud 2010, Chronic Stroke  Chronic CVA: 12 sec   TUG  Keeley pleitez al., 2005  MDC: 2.9 sec    Cut off score:  >13.5 sec community dwelling adults  >32.2 frail elderly  <20 I for basic transfers  >30 dependent on transfers 10 Meter Walk Test:   Abelino et al., 2006  Small meaningful change: 0.06 m/s  Substantial meaningful change: 0.14 m/s  MCID: 0.16 m/s    < 0.4 m/s household ambulators  0.4 - 0.8 m/s limited community ambulators  > 0.8 m/s community ambulators   FGA: Melody et al., 2010  MCID: 4.2 pts  Geriatrics/community < 22/30 fall risk  Geriatrics/community < 20/30 unexplained falls DGI  MDC: vestibular - 4 pts  MDC: geriatric/community - 3 pts  Falls risk <19/24   6 Minute Walk Test  Abelino et al., 2006  MDC: 60.98 m (200.01 ft)    Diane Bradley, & Luz, 2012  MCID: 34.4 m    Age Norms  60-69: M - 1876 ft   F - 1765 ft  70-79: M - 1729 ft   F - 1545 ft  80-89: M - 1368 ft   F - 1286 ft Modified Bridget  0: No increase in tone  1: Catch and release or min resistance at end range  1+: Catch  "f/b min resistance throughout remainder (< half ROM)  2: Easily moved, but more marked tone throughout most ROM  3: Significant tone, PROM difficult  4: Rigid   MiniBest: Rochelle et al., 2013  CVA < 17.5 fall risk Pass (Acute CVA)  MDC: 1.8 points (acute), 3.2 points (chronic)         Subjective  History of Present Illness  TARA: Chart review completed. Patient has global aphasia and is an unreliable historian. Kathy present throughout PT evaluation. Patient had a stroke [facility]. Kathy states, He has a stent put in his brain. Suspected discharged AMA 6/24 home (patient was upset and walked out of hospital). Kathy reports significant improvement in speech and gait these past two weeks. She reports he makes sentences on the phone with friends. Is getting strength back in R hand as wife notes he is not dropping the phone often. They do not have any records from the hospital.     UPDATE 8/12/2024: Patient presents to PT. Denies falls and/or LOB in last month; \"not that I can remember.\" Patient reports return to near PLOF. Aphasia remains and is a limiting factor in accuracy of subjective.     Primary AD: none  Assist level at home: independent  WC usage: no  PLOF: indpeendent    Patient goal: to return to PLOF    Pain  Current pain rating: unable to report     Social Support  Stairs in house: FF HR     Employment status:  - [employer]  Hand dominance: R    Treatments  Previous treatment: none  Current treatment: SLP    Objective  Vitals  - HR: 57bpm  - BP: 132/80mmHg  - SPO2: 98%    Sensation  - Light touch: unable to adequately assess    Coordination  - Dysmetria: not tested  - Alternating Toe Taps: WFL (mimicking therapist)    Modified Bridget  - R knee extension: 0 - no increase in tone  - L knee extension: 0 - no increase in tone  - R knee flexion: 0 - no increase in tone   - L knee flexion: 0 - no increase in tone  - R ankle DF: 0 - no increase in tone   - L ankle " DF: 0 - no increase in tone    Clonus  - L: No  - R: No    Vision  - Glasses: Yes  - Abnormalities prior to CVA: Yes, glasses-for distance   - Abnormalities post CVA: unable to assess, Kathy reports patient is not wearing his glasses recently    Neglect  - R sided: No  - L sided: No    Pusher's Syndrome  - R sided: No  - L sided: No        Outcome Measures Initial Eval  7/10 8/12 PN       5xSTS NT sec 7.9s no UE assist       TUG NT sec 5.70s, req practice and PT demo alongside       10 meter 1.22 m/s 1.45 m/s       FIELDS NT/56 defer       FGA 18/30 difficult to assess due to global aphasia patient scored 0 on items unable to complete 29/30       6MWT 1,200 ft no AD 1,575 ft no AD                            Jogging 300', no gait abnormalities; IND   FF stairs (26) every-other stair, 3 cycles IND  Speed STS with 10# TT x20   HKM with power knee drive and 10# TT hold, 2 laps of 40' ea direction  Speed amb with Y PB bounce, OP from PT to inc speed 2 laps of 40' ea direction  OH med ball slams 6#, 20x        Precautions: CVA  Past Medical History:   Diagnosis Date    Asthma     Erectile dysfunction     GERD (gastroesophageal reflux disease)     Hyperlipidemia     Kidney stone     Psoriasis     Squamous cell skin cancer 01/31/2023    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

## 2024-08-12 NOTE — PROGRESS NOTES
POC expires Auth Status Total   Visits  Start date  Expiration date PT/OT + Visit Limit? Co-Insurance   10/18 Required                                                 Visit/Unit Tracking  AUTH Status: required  Date          Visits  Authed:  Used 1 (IE) 1 1 1 1 1          Remaining   16 15 14 13 12           PLAN OF CARE START:24  PLAN OF CARE END: 10/18/2024  PROGRESS NOTE DUE: follow up post scheduling into September   FREQUENCY: 2-3x/week for 8-12 weeks  PRECAUTIONS: global aphasia   DIAGNOSIS: CVA, R side weakness and global aphasia   VISITS:  18       Daily Note     Today's date: 2024  Patient name: Severiano Ball  : 1970  MRN: [id number]  Referring provider: Selina Perez,*  Dx:   Encounter Diagnosis     ICD-10-CM    1. Cerebrovascular accident (CVA) due to stenosis of cerebral artery (HCC)  I63.50                      Subjective: Reports no changes since last visit.       Objective: See treatment below.    NMRE:   *Pt completed SCI FIT for 10 minutes total (5 minutes prograde, 5 minutes retrograde) at 2.5 resistance focusing on increasing proximal BUE strength, cardiopulmonary endurance, and act tolerance for ADLs/IADLs. 3 lb wrist weights donned b/l to promote muscle recruitment and for increased proprioceptive input to b/l sides. Pt encouraged to keep 40 SPM.     *Seated tabletop - pt completed grasp, release of large pegs into pegboard with hand exerciser and R UE with 3 lb wrist donned for inc proprioception. Pt instructed to retrieve pegs and transfer back to midline to place into board. Activity focusing on R UE FMC, target accuracy, and proprioception. Pt tolerated well with min drops and G task endurance, no rest breaks noted. Upgraded to having pt take out with blue pnkster and place into bowl.     *Completed rainbow arch activity with blue pincers seated tabletop while R UE donned with 3 lb wrist weight. Emphasis on proprioception,  sustained three point pinch.  Completed x2. Overall, pt tolerating well with G sustained pinch and only 1 visual demo needed.     Assessment: Tolerated treatment well. Continuously benefits from simple task focused commands and visual demo with occasional hand over hand assist secondary to cognitive impairments. Focus of session on UE strength, R UE motor control, and FMC. Pt would benefit from continued OT services to address R UE  strength, pinch strength, FMC, in hand manipulation skills, standing endurance/activity tolerance.       Plan: Continued skilled OT per POC.

## 2024-08-12 NOTE — PROGRESS NOTES
Daily Speech Treatment Note    Today's date: 2024   Patient’s name: Severiano Ball  : 1970  MRN: [id number]  Safety measures: acute cva   Referring provider: Selina Perez,*    Encounter Diagnosis     ICD-10-CM    1. 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 Cerebrovascular accident (CVA) due to stenosis of cerebral artery (HCC)  I63.50           Visit Tracking:  POC expires Unit limit Auth Expiration date PT/OT + Visit Limit?   10/10/24    2024  18                                           Visit/Unit Tracking  AUTH Status:  Date 7/10  IE  7/11  7/15  7/16  7/23 7/24  8/1  8/5  8/6  8/8  PU         yes Used 1  2  3  4  5 6  7  8  9  10  1         Remaining   17 16  15  14   13 12  11  10  9  8  7           Subjective/Behavioral:  -Patient pleasant, engaged, and cooperative. No new concerns reported.     Objective/Assessment:  -Reviewed testing results and goals in plan care with patient. Patient is in agreement at this time. Patient presents with very low volume and fast ROS which contributes to breakdown in comprehensibility for both known and unknown contexts with familiar and unfamiliar listeners. Patient demonstrated cohesive, intelligible utterances of at least 4-5 words in length before breakdown in intelligibility and presence of neologisms increases.     Short-term goals:     LISTENING  Patient will follow one-step verbal directions (e.g., close your eyes) with 60% accuracy to facilitate increased auditory comprehension skills  24: Patient followed basic one-step verbal commands with 81% accuracy given max cues including models and direct hand-over-hand physical assist. Given as HEP.      Patient will answer verbal Yes/No questions with 60% accuracy to facilitate increased auditory comprehension skills,   24: Patient answered basic verbal Y/N questions with 50% accuracy, IND. Accuracy increased to 70% given max cues including models  and visual (and physical, when possible) demonstration. Given as HEP.      READING  Patient will facilitate functional reading skills by matching word to picture with 60% accuracy over 5 sessions to facilitate growth of vocabulary concept comprehension,   Patient will facilitate functional reading skills by matching word to phrase/function with 60% accuracy over 5 sessions to facilitate growth of vocabulary concept comprehension,   Patient will facilitate functional reading skills by following one-step written directions (e.g., close your eyes) with 60% accuracy to facilitate increased reading comprehension skills,    Patient will facilitate functional reading skills by naming opposites (e.g., hot and ___) with choice of 3 with 60% accuracy to build expressive vocabulary for conversation,    Patient will facilitate functional reading skills by naming synonyms (e.g., street or ___) with choice of 3 with 60% accuracy to build expressive vocabulary for conversation,  Patient will facilitate functional reading skills by answering written Yes/No questions with 60% accuracy to facilitate increased reading comprehension skills,  Patient will facilitate functional reading skills by providing an appropriate word for sentence completion task (e.g., open the ___) with 60% accuracy to facilitate increased expressive language skills,     WRITING  Patient will complete simple writing tasks at the word level with >60% acc to increase writing skills within FLE,   Patient will improve functional writing skills     SPEAKING  Patient will complete naming of automatic tasks (e.g., counting, listing days of the week/months of the year, etc.) with 60% accuracy to facilitate increased verbal expression skills,   8/12/24: Patient stated the days of the week with 0% accuracy given max cues including orthographic cues, visuals for pacing, clinician models/errorless learning. Given as HEP.   Patient will imitate words/phrases using  MADISON/tapping/pacing with 60% accuracy to facilitate increased verbal expression skills,      Patient will name opposite (e.g., hot and ___) with 60% accuracy to build expressive vocabulary for conversation,   Patient will name a synonym (e.g., street or ___) with 60% accuracy to build expressive vocabulary for conversation,      Patient will name appropriate category for three words (e.g., apple, banana, pear = fruits) with 60% accuracy to facilitate improved generative naming skills,   Patient will provide an appropriate word for sentence completion task (e.g., open the ___) with 60% accuracy to facilitate increased expressive language skills,   Patient will provide an adequate response to generative naming task (i.e., name as many…) with 60% accuracy to facilitate increased expressive language skills,    Patient will provide an adequate response to confrontation naming task (i.e., what is…) with 60% accuracy to facilitate increased expressive language skills,      Patient will name up to 5 features to describe a person/place/thing with 60% accuracy to facilitate improved utilization of circumlocution strategy,      Patient will complete picture description tasks using language organization strategies with 60% accuracy to facilitate improved utilization of circumlocution strategy,     Plan:  -Patient was provided with home exercises/activities to target goals in plan of care at the end of today's session.  -Discussed session and patient's progress with caregiver/family member after today's session.  -Continue with current plan of care.     used

## 2024-08-13 ENCOUNTER — APPOINTMENT (OUTPATIENT)
Age: 54
End: 2024-08-13
Payer: COMMERCIAL

## 2024-08-13 DIAGNOSIS — I63.50 CEREBROVASCULAR ACCIDENT (CVA) DUE TO STENOSIS OF CEREBRAL ARTERY (HCC): Primary | ICD-10-CM

## 2024-08-13 NOTE — PROGRESS NOTES
Daily Speech Treatment Note    Today's date: 2024   Patient’s name: Severiano Ball  : 1970  MRN: [id number]  Safety measures: acute cva   Referring provider: Selina Perez,*    No diagnosis found.      Visit Tracking:  POC expires Unit limit Auth Expiration date PT/OT + Visit Limit?   10/10/24    2024  18                                           Visit/Unit Tracking  AUTH Status:  Date 7/10  IE  7/11  7/15  7/16  7/23 7/24  8/  8/  8  8  PU         yes Used 1  2  3  4  5 6  7  8  9  10  11         Remaining   17 16  15  14   13 12  11  10  9  8  7           Subjective/Behavioral:      Objective/Assessment:    Short-term goals:     LISTENING  Patient will follow one-step verbal directions (e.g., close your eyes) with 60% accuracy to facilitate increased auditory comprehension skills       Patient will answer verbal Yes/No questions with 60% accuracy to facilitate increased auditory comprehension skills,       READING  Patient will facilitate functional reading skills by matching word to picture with 60% accuracy over 5 sessions to facilitate growth of vocabulary concept comprehension,   Patient will facilitate functional reading skills by matching word to phrase/function with 60% accuracy over 5 sessions to facilitate growth of vocabulary concept comprehension,   Patient will facilitate functional reading skills by following one-step written directions (e.g., close your eyes) with 60% accuracy to facilitate increased reading comprehension skills,    Patient will facilitate functional reading skills by naming opposites (e.g., hot and ___) with choice of 3 with 60% accuracy to build expressive vocabulary for conversation,    Patient will facilitate functional reading skills by naming synonyms (e.g., street or ___) with choice of 3 with 60% accuracy to build expressive vocabulary for conversation,  Patient will facilitate functional reading skills by answering written Yes/No  questions with 60% accuracy to facilitate increased reading comprehension skills,  Patient will facilitate functional reading skills by providing an appropriate word for sentence completion task (e.g., open the ___) with 60% accuracy to facilitate increased expressive language skills,     WRITING  Patient will complete simple writing tasks at the word level with >60% acc to increase writing skills within FLE,   Patient will improve functional writing skills     SPEAKING  Patient will complete naming of automatic tasks (e.g., counting, listing days of the week/months of the year, etc.) with 60% accuracy to facilitate increased verbal expression skills,     Patient will imitate words/phrases using MADISON/tapping/pacing with 60% accuracy to facilitate increased verbal expression skills,      Patient will name opposite (e.g., hot and ___) with 60% accuracy to build expressive vocabulary for conversation,   Patient will name a synonym (e.g., street or ___) with 60% accuracy to build expressive vocabulary for conversation,      Patient will name appropriate category for three words (e.g., apple, banana, pear = fruits) with 60% accuracy to facilitate improved generative naming skills,   Patient will provide an appropriate word for sentence completion task (e.g., open the ___) with 60% accuracy to facilitate increased expressive language skills,   Patient will provide an adequate response to generative naming task (i.e., name as many…) with 60% accuracy to facilitate increased expressive language skills,    Patient will provide an adequate response to confrontation naming task (i.e., what is…) with 60% accuracy to facilitate increased expressive language skills,      Patient will name up to 5 features to describe a person/place/thing with 60% accuracy to facilitate improved utilization of circumlocution strategy,      Patient will complete picture description tasks using language organization strategies with 60% accuracy to  facilitate improved utilization of circumlocution strategy,     Plan:  -Patient was provided with home exercises/activities to target goals in plan of care at the end of today's session.  -Discussed session and patient's progress with caregiver/family member after today's session.  -Continue with current plan of care.

## 2024-08-15 ENCOUNTER — APPOINTMENT (OUTPATIENT)
Age: 54
End: 2024-08-15
Payer: COMMERCIAL

## 2024-08-19 ENCOUNTER — APPOINTMENT (OUTPATIENT)
Age: 54
End: 2024-08-19
Payer: COMMERCIAL

## 2024-08-19 ENCOUNTER — OFFICE VISIT (OUTPATIENT)
Age: 54
End: 2024-08-19
Payer: COMMERCIAL

## 2024-08-19 DIAGNOSIS — I63.50 CEREBROVASCULAR ACCIDENT (CVA) DUE TO STENOSIS OF CEREBRAL ARTERY (HCC): ICD-10-CM

## 2024-08-19 DIAGNOSIS — I69.320 APHASIA AS LATE EFFECT OF CEREBROVASCULAR ACCIDENT (CVA): Primary | ICD-10-CM

## 2024-08-19 PROCEDURE — 92507 TX SP LANG VOICE COMM INDIV: CPT

## 2024-08-19 NOTE — PROGRESS NOTES
Daily Speech Treatment Note    Today's date: 2024   Patient’s name: Severiano Ball  : 1970  MRN: [id number]  Safety measures: acute cva   Referring provider: Selina Perez,*    Encounter Diagnosis     ICD-10-CM    1. 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 Cerebrovascular accident (CVA) due to stenosis of cerebral artery (HCC)  I63.50             Visit Tracking:  POC expires Unit limit Auth Expiration date PT/OT + Visit Limit?   10/10/24    2024  18                                           Visit/Unit Tracking  AUTH Status:  Date 7/10  IE  7/11  7/15  7/16  7/23 7/24  8/1  8/5  8/6  8/8  PU       yes Used 1  2  3  4  5 6  7  8  9  10  11  12       Remaining   17 16  15  14   13 12  11  10  9  8  7  6         Subjective/Behavioral:  Patient reported no new concerns for today's session.     Objective/Assessment:Increased spontaneous speech for today's session! Examples: see you later, utilizing provocative language, Im tired,    Short-term goals:     LISTENING  Patient will follow one-step verbal directions (e.g., close your eyes) with 60% accuracy to facilitate increased auditory comprehension skills       Patient will answer verbal Yes/No questions with 60% accuracy to facilitate increased auditory comprehension skills,       READING  Patient will facilitate functional reading skills by matching word to picture with 60% accuracy over 5 sessions to facilitate growth of vocabulary concept comprehension,       Patient will facilitate functional reading skills by matching word to phrase/function with 60% accuracy over 5 sessions to facilitate growth of vocabulary concept comprehension,   Patient will facilitate functional reading skills by following one-step written directions (e.g., close your eyes) with 60% accuracy to facilitate increased reading comprehension skills,    Patient will facilitate functional reading skills by naming opposites  (e.g., hot and ___) with choice of 3 with 60% accuracy to build expressive vocabulary for conversation,    Patient will facilitate functional reading skills by naming synonyms (e.g., street or ___) with choice of 3 with 60% accuracy to build expressive vocabulary for conversation,  Patient will facilitate functional reading skills by answering written Yes/No questions with 60% accuracy to facilitate increased reading comprehension skills,  Patient will facilitate functional reading skills by providing an appropriate word for sentence completion task (e.g., open the ___) with 60% accuracy to facilitate increased expressive language skills,    Matching sentence with multiple choice of 3: Task completed in 9/10 opp (90% acc) from min-mod cueing. Patient benefited from verbal models of sentences and gestures. Patient attempted to verbalize sentences which he has not done before. Noted volume low and speech mumbled.     WRITING  Patient will complete simple writing tasks at the word level with >60% acc to increase writing skills within FLE,     Patient was given a picture and matching word. Patient to write the matching word. Task completed in 8/10 opp (80% acc) independently, increasing to 10/10 opp (100% acc) from min cueing. Patient benefited from written models. Noted few phonic errors (ex. Writing /a/ instead of /e/).     Patient will improve functional writing skills     SPEAKING  Patient will complete naming of automatic tasks (e.g., counting, listing days of the week/months of the year, etc.) with 60% accuracy to facilitate increased verbal expression skills,     Patient to read the written days of the week! With moderate verbal models and gestures, patient was able to generate the days of the week 3x! After max visual and verbal cues, patient was able to write the days of the week. Noted occasional phonic errors.      Patient will imitate words/phrases using MADISON/tapping/pacing with 60% accuracy to facilitate  increased verbal expression skills,      Patient will name opposite (e.g., hot and ___) with 60% accuracy to build expressive vocabulary for conversation,   Patient will name a synonym (e.g., street or ___) with 60% accuracy to build expressive vocabulary for conversation,      Patient will name appropriate category for three words (e.g., apple, banana, pear = fruits) with 60% accuracy to facilitate improved generative naming skills,   Patient will provide an appropriate word for sentence completion task (e.g., open the ___) with 60% accuracy to facilitate increased expressive language skills,   Patient will provide an adequate response to generative naming task (i.e., name as many…) with 60% accuracy to facilitate increased expressive language skills,    Patient will provide an adequate response to confrontation naming task (i.e., what is…) with 60% accuracy to facilitate increased expressive language skills,      Patient will name up to 5 features to describe a person/place/thing with 60% accuracy to facilitate improved utilization of circumlocution strategy,      Patient will complete picture description tasks using language organization strategies with 60% accuracy to facilitate improved utilization of circumlocution strategy,     Plan:  -Patient was provided with home exercises/activities to target goals in plan of care at the end of today's session.  -Continue with current plan of care.

## 2024-08-20 ENCOUNTER — OFFICE VISIT (OUTPATIENT)
Age: 54
End: 2024-08-20
Payer: COMMERCIAL

## 2024-08-20 ENCOUNTER — APPOINTMENT (OUTPATIENT)
Age: 54
End: 2024-08-20
Payer: COMMERCIAL

## 2024-08-20 DIAGNOSIS — I69.320 APHASIA AS LATE EFFECT OF CEREBROVASCULAR ACCIDENT (CVA): Primary | ICD-10-CM

## 2024-08-20 DIAGNOSIS — I63.50 CEREBROVASCULAR ACCIDENT (CVA) DUE TO STENOSIS OF CEREBRAL ARTERY (HCC): ICD-10-CM

## 2024-08-20 PROCEDURE — 92507 TX SP LANG VOICE COMM INDIV: CPT

## 2024-08-20 NOTE — PROGRESS NOTES
"Daily Speech Treatment Note    Today's date: 2024   Patient’s name: Severiano Ball  : 1970  MRN: [id number]  Safety measures: acute cva   Referring provider: Selina Perez,*    Encounter Diagnosis     ICD-10-CM    1. 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 Cerebrovascular accident (CVA) due to stenosis of cerebral artery (HCC)  I63.50               Visit Tracking:  POC expires Unit limit Auth Expiration date PT/OT + Visit Limit?   10/10/24    2024  18                                           Visit/Unit Tracking  AUTH Status:  Date 7/10  IE  7/11  7/15  7/16  7/23 7/24  8/1  8/5  8/6  8/8  PU     yes Used 1  2  3  4  5 6  7  8  9  10  11  12  13     Remaining   17 16  15  14   13 12  11  10  9  8  7  6  5       Subjective/Behavioral: Patient pleasant, engaged, and cooperative. Patient attended today's session unaccompanied. No new concerns reported.     Objective/Assessment: Continued observation of increase in spontaneous utterances today. Patient maintained most of message; however, when prompted for further clarification, patient would then present with increased perseverations, neologisms, and paraphasias. Examples of utterances: \"The hardest thing is trying to keep (undiscernable); that's the hardest thing\"; \"It's so frustrating, I just can't get it.\" Patient exhibited strong reading skills for one and two words; demonstrated increased paraphasias/neologisms when reading sentences out loud; was able to read at the word level.         Short-term goals:     LISTENING  Patient will follow one-step verbal directions (e.g., close your eyes) with 60% accuracy to facilitate increased auditory comprehension skills       Patient will answer verbal Yes/No questions with 60% accuracy to facilitate increased auditory comprehension skills,       READING  Patient will facilitate functional reading skills by matching word to picture with 60% " accuracy over 5 sessions to facilitate growth of vocabulary concept comprehension,     Patient will facilitate functional reading skills by matching word to phrase/function with 60% accuracy over 5 sessions to facilitate growth of vocabulary concept comprehension,     Patient will facilitate functional reading skills by following one-step written directions (e.g., close your eyes) with 60% accuracy to facilitate increased reading comprehension skills,      Patient will facilitate functional reading skills by naming opposites (e.g., hot and ___) with choice of 3 with 60% accuracy to build expressive vocabulary for conversation  8/20/24: Patient identified antonyms given a choice of two with 70% accuracy, IND. Accuracy increased to 100% given mod-max cues including gestures, semantic cues, physical demonstration with real objects when possible, and models.      Patient will facilitate functional reading skills by naming synonyms (e.g., street or ___) with choice of 3 with 60% accuracy to build expressive vocabulary for conversation,    Patient will facilitate functional reading skills by answering written Yes/No questions with 60% accuracy to facilitate increased reading comprehension skills,    Patient will facilitate functional reading skills by providing an appropriate word for sentence completion task (e.g., open the ___) with 60% accuracy to facilitate increased expressive language skills,         WRITING  Patient will complete simple writing tasks at the word level with >60% acc to increase writing skills within FLE,     Patient will improve functional writing skills     SPEAKING  Patient will complete naming of automatic tasks (e.g., counting, listing days of the week/months of the year, etc.) with 60% accuracy to facilitate increased verbal expression skills,   8/20/24: Patient stated the days of the week with 0% accuracy given max cues including orthographic cues, visuals for pacing, gestures, phonemic  placement cues, direct verbal instruction, immediate, direct verbal feedback, clinician models, and in unison productions.     Patient will imitate words/phrases using MADISON/tapping/pacing with 60% accuracy to facilitate increased verbal expression skills,      Patient will name opposite (e.g., hot and ___) with 60% accuracy to build expressive vocabulary for conversation,   Patient will name a synonym (e.g., street or ___) with 60% accuracy to build expressive vocabulary for conversation,      Patient will name appropriate category for three words (e.g., apple, banana, pear = fruits) with 60% accuracy to facilitate improved generative naming skills,   Patient will provide an appropriate word for sentence completion task (e.g., open the ___) with 60% accuracy to facilitate increased expressive language skills,   Patient will provide an adequate response to generative naming task (i.e., name as many…) with 60% accuracy to facilitate increased expressive language skills,    Patient will provide an adequate response to confrontation naming task (i.e., what is…) with 60% accuracy to facilitate increased expressive language skills,      Patient will name up to 5 features to describe a person/place/thing with 60% accuracy to facilitate improved utilization of circumlocution strategy,      Patient will complete picture description tasks using language organization strategies with 60% accuracy to facilitate improved utilization of circumlocution strategy,     Plan:  -Patient was provided with home exercises/activities to target goals in plan of care at the end of today's session.  -Discussed session and patient's progress with caregiver/family member after today's session.  -Continue with current plan of care.

## 2024-08-21 ENCOUNTER — OFFICE VISIT (OUTPATIENT)
Dept: FAMILY MEDICINE CLINIC | Facility: CLINIC | Age: 54
End: 2024-08-21
Payer: COMMERCIAL

## 2024-08-21 VITALS
OXYGEN SATURATION: 97 % | HEART RATE: 61 BPM | WEIGHT: 170 LBS | TEMPERATURE: 97.8 F | DIASTOLIC BLOOD PRESSURE: 80 MMHG | HEIGHT: 70 IN | SYSTOLIC BLOOD PRESSURE: 122 MMHG | BODY MASS INDEX: 24.34 KG/M2

## 2024-08-21 DIAGNOSIS — E78.2 MIXED HYPERLIPIDEMIA: ICD-10-CM

## 2024-08-21 DIAGNOSIS — F80.2 WERNICKE'S APHASIA: ICD-10-CM

## 2024-08-21 DIAGNOSIS — I63.50 CEREBROVASCULAR ACCIDENT (CVA) DUE TO STENOSIS OF CEREBRAL ARTERY (HCC): Primary | ICD-10-CM

## 2024-08-21 PROCEDURE — 99214 OFFICE O/P EST MOD 30 MIN: CPT | Performed by: STUDENT IN AN ORGANIZED HEALTH CARE EDUCATION/TRAINING PROGRAM

## 2024-08-21 RX ORDER — ROSUVASTATIN CALCIUM 20 MG/1
20 TABLET, COATED ORAL DAILY
Qty: 90 TABLET | Refills: 1 | Status: SHIPPED | OUTPATIENT
Start: 2024-08-21

## 2024-08-21 NOTE — PROGRESS NOTES
Assessment/Plan:         Problem List Items Addressed This Visit        Cardiovascular and Mediastinum    Cerebrovascular accident (CVA) due to stenosis of cerebral artery (HCC) - Primary    Relevant Orders    Ambulatory Referral to Neurology       Other    Hyperlipidemia    Relevant Medications    rosuvastatin (CRESTOR) 20 MG tablet   Other Visit Diagnoses     Wernicke's aphasia              Discussed compliance with asa, statin, and ticagrelor     Subjective:      Patient ID: Severiano Ball is a 54 y.o. male.    HPI    Had stroke in June, finished PT/OT and getting better. His speech is not improving. Refuses taking any of his medications. Had emergency room visit for allergy reaction to plavix. Wife states soimetimes he deosnt recognize objects like he use to (picked up a fly swatter to cut bread), cannot do his prior  jobs due to issues identifying objects and tools    Admitted for CVA at [place] in [facility]. Requried a stent in his brain. No records, not surew how long he was supposed to be on DAPT    The following portions of the patient's history were reviewed and updated as appropriate:   Past Medical History:  He has a past medical history of Asthma, Erectile dysfunction, GERD (gastroesophageal reflux disease), Hyperlipidemia, Kidney stone, Psoriasis, and Squamous cell skin cancer (01/31/2023).,  _______________________________________________________________________  Medical Problems:  does not have any pertinent problems on file.,  _______________________________________________________________________  Past Surgical History:   has a past surgical history that includes Hand surgery (Right); Tonsillectomy; [place] tooth extraction; Colonoscopy; Hernia repair (Bilateral, 11/17/2021); Skin biopsy; Mohs surgery (05/10/2023); and pr adjt tis trns/reargmt f/c/c/m/n/a/g/h/f 10sqcm/< (Right, 5/11/2023).,  _______________________________________________________________________  Family History:  family  history includes Diabetes in his mother; Prostate cancer in his father.,  _______________________________________________________________________  Social History:   reports that he has been smoking cigarettes. He has a 52.5 pack-year smoking history. He has quit using smokeless tobacco. He reports that he does not currently use alcohol. He reports that he does not currently use drugs after having used the following drugs: Marijuana. Frequency: 7.00 times per week.,  _______________________________________________________________________  Allergies:  has No Known Allergies..  _______________________________________________________________________  Current Outpatient Medications   Medication Sig Dispense Refill   • rosuvastatin (CRESTOR) 20 MG tablet Take 1 tablet (20 mg total) by mouth daily 90 tablet 1   • ticagrelor 60 MG Take 1 tablet (60 mg total) by mouth every 12 (twelve) hours (Patient not taking: Reported on 8/21/2024) 60 tablet 2   • triamcinolone (KENALOG) 0.5 % cream Apply topically 3 (three) times a day (Patient not taking: Reported on 8/21/2024) 45 g 2     No current facility-administered medications for this visit.     _______________________________________________________________________  Review of Systems   Constitutional:  Negative for chills, fatigue and fever.   HENT:  Negative for rhinorrhea and sore throat.    Eyes:  Negative for visual disturbance.   Respiratory:  Negative for cough and shortness of breath.    Cardiovascular:  Negative for chest pain and palpitations.   Gastrointestinal:  Negative for abdominal pain, constipation, diarrhea, nausea and vomiting.   Genitourinary:  Negative for difficulty urinating, dysuria and frequency.   Musculoskeletal:  Negative for arthralgias and myalgias.   Skin:  Negative for color change and rash.   Neurological:  Positive for speech difficulty. Negative for dizziness, facial asymmetry, weakness and headaches.         Objective:  Vitals:    08/21/24 0958  "  BP: 122/80   Pulse: 61   Temp: 97.8 °F (36.6 °C)   SpO2: 97%   Weight: 77.1 kg (170 lb)   Height: 5' 10\" (1.778 m)     Body mass index is 24.39 kg/m².     Physical Exam  Constitutional:       General: He is not in acute distress.     Appearance: Normal appearance. He is not ill-appearing.   HENT:      Head: Normocephalic and atraumatic.      Right Ear: External ear normal.      Left Ear: External ear normal.      Nose: Nose normal. No congestion or rhinorrhea.      Mouth/Throat:      Mouth: Mucous membranes are moist.      Pharynx: Oropharynx is clear. No oropharyngeal exudate or posterior oropharyngeal erythema.   Eyes:      General: No visual field deficit.     Extraocular Movements: Extraocular movements intact.      Conjunctiva/sclera: Conjunctivae normal.      Pupils: Pupils are equal, round, and reactive to light.   Cardiovascular:      Rate and Rhythm: Normal rate and regular rhythm.      Pulses: Normal pulses.      Heart sounds: No murmur heard.  Pulmonary:      Effort: Pulmonary effort is normal. No respiratory distress.      Breath sounds: Normal breath sounds. No wheezing.   Chest:      Chest wall: No tenderness.   Abdominal:      General: Bowel sounds are normal.      Palpations: Abdomen is soft.      Tenderness: There is no abdominal tenderness.   Musculoskeletal:         General: Normal range of motion.      Cervical back: Normal range of motion.   Skin:     General: Skin is warm and dry.      Capillary Refill: Capillary refill takes less than 2 seconds.      Findings: No rash.   Neurological:      General: No focal deficit present.      Mental Status: He is alert and oriented to person, place, and time. Mental status is at baseline.      Cranial Nerves: Cranial nerve deficit present.      Motor: Motor function is intact.      Gait: Gait is intact.      Comments: Word salada speech, comprehension is good   Psychiatric:         Mood and Affect: Mood normal.         Behavior: Behavior normal.         "

## 2024-08-22 ENCOUNTER — OFFICE VISIT (OUTPATIENT)
Age: 54
End: 2024-08-22
Payer: COMMERCIAL

## 2024-08-22 ENCOUNTER — APPOINTMENT (OUTPATIENT)
Age: 54
End: 2024-08-22
Payer: COMMERCIAL

## 2024-08-22 DIAGNOSIS — I69.320 APHASIA AS LATE EFFECT OF CEREBROVASCULAR ACCIDENT (CVA): Primary | ICD-10-CM

## 2024-08-22 DIAGNOSIS — I63.50 CEREBROVASCULAR ACCIDENT (CVA) DUE TO STENOSIS OF CEREBRAL ARTERY (HCC): ICD-10-CM

## 2024-08-22 PROCEDURE — 92507 TX SP LANG VOICE COMM INDIV: CPT

## 2024-08-22 NOTE — PROGRESS NOTES
Daily Speech Treatment Note    Today's date: 2024   Patient’s name: Severiano Ball  : 1970  MRN: [id number]  Safety measures: acute cva   Referring provider: Selina Perez,*    Encounter Diagnosis     ICD-10-CM    1. 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 Cerebrovascular accident (CVA) due to stenosis of cerebral artery (HCC)  I63.50                 Visit Tracking:  POC expires Unit limit Auth Expiration date PT/OT + Visit Limit?   10/10/24    2024  18                                           Visit/Unit Tracking  AUTH Status:  Date 7/10  IE  7/11  7/15  7/16  7/23 7/24  8/1  8/5  8/6  8/8  PU     yes Used 1  2  3  4  5 6  7  8  9  10  11  12  13 14     Remaining   17 16  15  14   13 12  11  10  9  8  7  6  5 4       Subjective/Behavioral:  No new concerns reported. Noted increased frustrations during today's session due to lack of comprehension.    Objective/Assessment: Continued observation of increase in spontaneous utterances today. Patient maintained most of message; however, when prompted for further clarification, patient would then present with increased perseverations, neologisms, and paraphasias, and undiscernable speech.       Short-term goals:     LISTENING  Patient will follow one-step verbal directions (e.g., close your eyes) with 60% accuracy to facilitate increased auditory comprehension skills    1 step directions of point to verbalized object (field of 4). Task completed in 3/15 opp (20% acc) independently, increasing to 9/15 opp (60% acc) from max cueing. Patient benefited from repetition, hand over hand, verbal/visual models. Attempted to have patient verbalize pictures, however, patient would verbalize random letters.       Patient will answer verbal Yes/No questions with 60% accuracy to facilitate increased auditory comprehension skills,       READING  Patient will facilitate functional reading skills by  matching word to picture with 60% accuracy over 5 sessions to facilitate growth of vocabulary concept comprehension,     Matching word to pictures: Task completed in 5/5 opp (100% acc) independently.        Patient will facilitate functional reading skills by matching word to phrase/function with 60% accuracy over 5 sessions to facilitate growth of vocabulary concept comprehension,       Matching word to picture: Task completed in 6/10 opp (60% acc)  from max cueing. Patient benefited from repetition, gestures, and hand over hand cues.    Matching pictures from 2 word options. Task completed in 13/24 opp (54% acc) from max cueing. Patient benefited from repetition, gestures and hand over hand cues.       Patient will facilitate functional reading skills by following one-step written directions (e.g., close your eyes) with 60% accuracy to facilitate increased reading comprehension skills,      Patient will facilitate functional reading skills by naming opposites (e.g., hot and ___) with choice of 3 with 60% accuracy to build expressive vocabulary for conversation      Patient will facilitate functional reading skills by naming synonyms (e.g., street or ___) with choice of 3 with 60% accuracy to build expressive vocabulary for conversation,    Patient will facilitate functional reading skills by answering written Yes/No questions with 60% accuracy to facilitate increased reading comprehension skills,    Patient will facilitate functional reading skills by providing an appropriate word for sentence completion task (e.g., open the ___) with 60% accuracy to facilitate increased expressive language skills,         WRITING  Patient will complete simple writing tasks at the word level with >60% acc to increase writing skills within FLE,     Patient will improve functional writing skills     SPEAKING  Patient will complete naming of automatic tasks (e.g., counting, listing days of the week/months of the year, etc.) with 60%  accuracy to facilitate increased verbal expression skills,       Patient will imitate words/phrases using MADISON/tapping/pacing with 60% accuracy to facilitate increased verbal expression skills,      Patient will name opposite (e.g., hot and ___) with 60% accuracy to build expressive vocabulary for conversation,   Patient will name a synonym (e.g., street or ___) with 60% accuracy to build expressive vocabulary for conversation,      Patient will name appropriate category for three words (e.g., apple, banana, pear = fruits) with 60% accuracy to facilitate improved generative naming skills,   Patient will provide an appropriate word for sentence completion task (e.g., open the ___) with 60% accuracy to facilitate increased expressive language skills,   Patient will provide an adequate response to generative naming task (i.e., name as many…) with 60% accuracy to facilitate increased expressive language skills,    Patient will provide an adequate response to confrontation naming task (i.e., what is…) with 60% accuracy to facilitate increased expressive language skills,      Patient will name up to 5 features to describe a person/place/thing with 60% accuracy to facilitate improved utilization of circumlocution strategy,      Patient will complete picture description tasks using language organization strategies with 60% accuracy to facilitate improved utilization of circumlocution strategy,     Plan:  -Patient was provided with home exercises/activities to target goals in plan of care at the end of today's session.  -Continue with current plan of care.

## 2024-08-26 ENCOUNTER — APPOINTMENT (OUTPATIENT)
Age: 54
End: 2024-08-26
Payer: COMMERCIAL

## 2024-08-26 ENCOUNTER — OFFICE VISIT (OUTPATIENT)
Age: 54
End: 2024-08-26
Payer: COMMERCIAL

## 2024-08-26 DIAGNOSIS — I69.320 APHASIA AS LATE EFFECT OF CEREBROVASCULAR ACCIDENT (CVA): Primary | ICD-10-CM

## 2024-08-26 DIAGNOSIS — I63.50 CEREBROVASCULAR ACCIDENT (CVA) DUE TO STENOSIS OF CEREBRAL ARTERY (HCC): ICD-10-CM

## 2024-08-26 PROCEDURE — 92507 TX SP LANG VOICE COMM INDIV: CPT

## 2024-08-26 NOTE — PROGRESS NOTES
"Daily Speech Treatment Note    Today's date: 2024   Patient’s name: Severiano Ball  : 1970  MRN: [id number]  Safety measures: acute cva   Referring provider: Selina Perez,*    Encounter Diagnosis     ICD-10-CM    1. 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 Cerebrovascular accident (CVA) due to stenosis of cerebral artery (HCC)  I63.50             Visit Tracking:  POC expires Unit limit Auth Expiration date PT/OT + Visit Limit?   10/10/24    2024  18                                           Visit/Unit Tracking  AUTH Status:  Date 7/10  IE  7/11  7/15  7/16  7/23 7/24  8/1  8/5  8/6  8/8  PU     RE   yes Used 1  2  3  4  5 6  7  8  9  10  11  12  13 14 15      Remaining   17 16  15  14   13 12  11  10  9  8  7  6  5 4 3        Subjective/Behavioral:  Patient pleasant, engaged, and cooperative. Patient attended today's session unaccompanied. Patient's partner reported pt \"is doing crazy things at home - like taking perfectly good things apart and throwing them in the trash\".     Objective/Assessment: Patient presented with increased length of spontaneous utterances. Patient conveyed having significant difficulty completing HEP. Reviewed HEP in which patient had to match a word to its definition given an answer bank. Patient completed this activity with 70% accuracy, IND! Accuracy increased to 100% given min-mod cues including reducing visual field, direct verbal instruction, semantic cues, and immediate, direct verbal feedback.        Short-term goals:     LISTENING  Patient will follow one-step verbal directions (e.g., close your eyes) with 60% accuracy to facilitate increased auditory comprehension skills           Patient will answer verbal Yes/No questions with 60% accuracy to facilitate increased auditory comprehension skills,       READING  Patient will facilitate functional reading skills by matching word to picture with " 60% accuracy over 5 sessions to facilitate growth of vocabulary concept comprehension,     Patient will facilitate functional reading skills by matching word to phrase/function with 60% accuracy over 5 sessions to facilitate growth of vocabulary concept comprehension,      Patient will facilitate functional reading skills by following one-step written directions (e.g., close your eyes) with 60% accuracy to facilitate increased reading comprehension skills,      Patient will facilitate functional reading skills by naming opposites (e.g., hot and ___) with choice of 3 with 60% accuracy to build expressive vocabulary for conversation    Patient will facilitate functional reading skills by naming synonyms (e.g., street or ___) with choice of 3 with 60% accuracy to build expressive vocabulary for conversation,    Patient will facilitate functional reading skills by answering written Yes/No questions with 60% accuracy to facilitate increased reading comprehension skills,    Patient will facilitate functional reading skills by providing an appropriate word for sentence completion task (e.g., open the ___) with 60% accuracy to facilitate increased expressive language skills,         WRITING  Patient will complete simple writing tasks at the word level with >60% acc to increase writing skills within FLE,   8/26/24: Patient wrote letters from dictation with 0% accuracy, IND; 25% accuracy given mod verbal and visual cues, and 100% accuracy given max cues including models. Given as HEP with explicit instructions for pt's partner to reduce visual field of alphabet AAC board to one row of letters at a time, and if he cannot identify and copy the letter, to reduce visual field to 3 letters.     Patient will improve functional writing skills     SPEAKING  Patient will complete naming of automatic tasks (e.g., counting, listing days of the week/months of the year, etc.) with 60% accuracy to facilitate increased verbal expression  skills,       Patient will imitate words/phrases using MADISON/tapping/pacing with 60% accuracy to facilitate increased verbal expression skills,      Patient will name opposite (e.g., hot and ___) with 60% accuracy to build expressive vocabulary for conversation,   Patient will name a synonym (e.g., street or ___) with 60% accuracy to build expressive vocabulary for conversation,      Patient will name appropriate category for three words (e.g., apple, banana, pear = fruits) with 60% accuracy to facilitate improved generative naming skills,   Patient will provide an appropriate word for sentence completion task (e.g., open the ___) with 60% accuracy to facilitate increased expressive language skills,   Patient will provide an adequate response to generative naming task (i.e., name as many…) with 60% accuracy to facilitate increased expressive language skills,    Patient will provide an adequate response to confrontation naming task (i.e., what is…) with 60% accuracy to facilitate increased expressive language skills,      Patient will name up to 5 features to describe a person/place/thing with 60% accuracy to facilitate improved utilization of circumlocution strategy,      Patient will complete picture description tasks using language organization strategies with 60% accuracy to facilitate improved utilization of circumlocution strategy,     Plan:  -Patient was provided with home exercises/activities to target goals in plan of care at the end of today's session.  -Continue with current plan of care.

## 2024-08-27 ENCOUNTER — OFFICE VISIT (OUTPATIENT)
Age: 54
End: 2024-08-27
Payer: COMMERCIAL

## 2024-08-27 ENCOUNTER — APPOINTMENT (OUTPATIENT)
Age: 54
End: 2024-08-27
Payer: COMMERCIAL

## 2024-08-27 DIAGNOSIS — I69.320 APHASIA AS LATE EFFECT OF CEREBROVASCULAR ACCIDENT (CVA): Primary | ICD-10-CM

## 2024-08-27 DIAGNOSIS — I63.50 CEREBROVASCULAR ACCIDENT (CVA) DUE TO STENOSIS OF CEREBRAL ARTERY (HCC): ICD-10-CM

## 2024-08-27 PROCEDURE — 92507 TX SP LANG VOICE COMM INDIV: CPT

## 2024-08-27 NOTE — PROGRESS NOTES
Daily Speech Treatment Note    Today's date: 2024   Patient’s name: Severiano Ball  : 1970  MRN: [id number]  Safety measures: acute cva   Referring provider: Selina Perez,*    Encounter Diagnosis     ICD-10-CM    1. 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 Cerebrovascular accident (CVA) due to stenosis of cerebral artery (HCC)  I63.50               Visit Tracking:  POC expires Unit limit Auth Expiration date PT/OT + Visit Limit?   10/10/24    2024  18                                           Visit/Unit Tracking  AUTH Status:  Date 7/10  IE  7/11  7/15  7/16  7/23 7/24  8/1  8/5  8/6  8/8  PU     yes Used 1  2  3  4  5 6  7  8  9  10  11  12  13 14 15 16     Remaining   17 16  15  14   13 12  11  10  9  8  7  6  5 4 3 2       Subjective/Behavioral:  Throughout the session patient expressed frustrations c/b by shaking his head with his expressive language and benefited from cues to taking deep breaths and pauses from therapeutic tasks. Provided education to patient that recovery after acute CVA will be intensive and challenging, however, it is necessary for progress of language skills.    Objective/Assessment: Increased intelligible and spontaneous speech for today's session. At the end of today's session patient independently stated see you later!      Short-term goals:     LISTENING  Patient will follow one-step verbal directions (e.g., close your eyes) with 60% accuracy to facilitate increased auditory comprehension skills           Patient will answer verbal Yes/No questions with 60% accuracy to facilitate increased auditory comprehension skills,       READING  Patient will facilitate functional reading skills by matching word to picture with 60% accuracy over 5 sessions to facilitate growth of vocabulary concept comprehension,     Matching phrases: Task completed in 5/5 opp (100% acc) independently! Patient attempted to  read each phrase, however, was unsuccessful.Noted patient read phrases as numbers and attempted to spell the word (incorrectly) with max verbal models.      Patient will facilitate functional reading skills by matching word to phrase/function with 60% accuracy over 5 sessions to facilitate growth of vocabulary concept comprehension,     Matching object to action: Task completed in 22/25 opp (88% acc) independently, increasing to 25/25 opp (100% acc) from min-mod cueing. Patient benefited from gestures and hand over hand tactile cueing. Uncertain if patient truly comprehending the action of the pictures or was just matching the same pictures.      Patient will facilitate functional reading skills by following one-step written directions (e.g., close your eyes) with 60% accuracy to facilitate increased reading comprehension skills,      Patient will facilitate functional reading skills by naming opposites (e.g., hot and ___) with choice of 3 with 60% accuracy to build expressive vocabulary for conversation    Patient will facilitate functional reading skills by naming synonyms (e.g., street or ___) with choice of 3 with 60% accuracy to build expressive vocabulary for conversation,    Patient will facilitate functional reading skills by answering written Yes/No questions with 60% accuracy to facilitate increased reading comprehension skills,    Patient will facilitate functional reading skills by providing an appropriate word for sentence completion task (e.g., open the ___) with 60% accuracy to facilitate increased expressive language skills,    Sentence completion task with 3 choices for appropriate word: Task completed in 6/10 opp (60% acc) independently, increasing to 8/10 opp (80% acc) from mod-max cueing. Patient benefited from verbal models. Spontaneous attempts at reading the sentence!          WRITING  Patient will complete simple writing tasks at the word level with >60% acc to increase writing skills within  FLE,     Patient will improve functional writing skills     SPEAKING  Patient will complete naming of automatic tasks (e.g., counting, listing days of the week/months of the year, etc.) with 60% accuracy to facilitate increased verbal expression skills,     From dictation patient to read numbers 1-6. Missed 1 each repetition after max verbal models and gestures. After multiple attempts with max verbal models, patient was able to read ABC.    Independently patient was able to verbalize written words: hi, bye, pen, and spelled Severiano and verbalized Severiano correctly! Unable to independently verbalize Tuesday.    Patient will imitate words/phrases using MADISON/tapping/pacing with 60% accuracy to facilitate increased verbal expression skills,      Patient will name opposite (e.g., hot and ___) with 60% accuracy to build expressive vocabulary for conversation,   Patient will name a synonym (e.g., street or ___) with 60% accuracy to build expressive vocabulary for conversation,      Patient will name appropriate category for three words (e.g., apple, banana, pear = fruits) with 60% accuracy to facilitate improved generative naming skills,   Patient will provide an appropriate word for sentence completion task (e.g., open the ___) with 60% accuracy to facilitate increased expressive language skills,   Patient will provide an adequate response to generative naming task (i.e., name as many…) with 60% accuracy to facilitate increased expressive language skills,    Patient will provide an adequate response to confrontation naming task (i.e., what is…) with 60% accuracy to facilitate increased expressive language skills,      Patient will name up to 5 features to describe a person/place/thing with 60% accuracy to facilitate improved utilization of circumlocution strategy,      Patient will complete picture description tasks using language organization strategies with 60% accuracy to facilitate improved utilization of circumlocution  strategy,     Plan:  -Patient was provided with home exercises/activities to target goals in plan of care at the end of today's session.  -Continue with current plan of care.

## 2024-08-29 ENCOUNTER — OFFICE VISIT (OUTPATIENT)
Age: 54
End: 2024-08-29
Payer: COMMERCIAL

## 2024-08-29 ENCOUNTER — APPOINTMENT (OUTPATIENT)
Age: 54
End: 2024-08-29
Payer: COMMERCIAL

## 2024-08-29 DIAGNOSIS — I69.320 APHASIA AS LATE EFFECT OF CEREBROVASCULAR ACCIDENT (CVA): Primary | ICD-10-CM

## 2024-08-29 DIAGNOSIS — I63.50 CEREBROVASCULAR ACCIDENT (CVA) DUE TO STENOSIS OF CEREBRAL ARTERY (HCC): ICD-10-CM

## 2024-08-29 PROCEDURE — 92507 TX SP LANG VOICE COMM INDIV: CPT

## 2024-08-29 NOTE — PROGRESS NOTES
"Daily Speech Treatment Note    Today's date: 2024   Patient’s name: Severiano Ball  : 1970  MRN: [id number]  Safety measures: acute cva   Referring provider: Selina Perez,*    Encounter Diagnosis     ICD-10-CM    1. 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 Cerebrovascular accident (CVA) due to stenosis of cerebral artery (HCC)  I63.50             Visit Tracking:  POC expires Unit limit Auth Expiration date PT/OT + Visit Limit?   10/10/24    2024  18                                           Visit/Unit Tracking  AUTH Status:  Date 7/10  IE  7/11  7/15  7/16  7/23 7/24  8/1  8/5  8/6  8/8  PU     RE   yes Used 1  2  3  4  5 6  7  8  9  10  11  12  13 14 15 16 17      Remaining   17 16  15  14   13 12  11  10  9  8  7  6  5 4 3 2 1        Subjective/Behavioral: Patient pleasant, engaged, and cooperative. Patient attended today's session unaccompanied. No new concerns reported.     Objective/Assessment: Patient presented with significant increase in neologisms today. Clinician had a difficult time understanding about 75% of what patient was attempting to verbally express at various points throughout session. Patient observed self-cueing for automatic counting task - \"it would be... One\".       Short-term goals:     LISTENING  Patient will follow one-step verbal directions (e.g., close your eyes) with 60% accuracy to facilitate increased auditory comprehension skills  24: Patient followed one-step commands containing body parts (e.g. touch your nose, show me your hand) with 75% accuracy given max cues including errorless learning/immediate models, orthographic cues, and hand-over-hand physical assist. (Patient completed this activity with 0% accuracy, IND.)     Patient will answer verbal Yes/No questions with 60% accuracy to facilitate increased auditory comprehension skills,       READING  Patient will facilitate " functional reading skills by matching word to picture with 60% accuracy over 5 sessions to facilitate growth of vocabulary concept comprehension,     Patient will facilitate functional reading skills by matching word to phrase/function with 60% accuracy over 5 sessions to facilitate growth of vocabulary concept comprehension,     Patient will facilitate functional reading skills by following one-step written directions (e.g., close your eyes) with 60% accuracy to facilitate increased reading comprehension skills,      Patient will facilitate functional reading skills by naming opposites (e.g., hot and ___) with choice of 3 with 60% accuracy to build expressive vocabulary for conversation    Patient will facilitate functional reading skills by naming synonyms (e.g., street or ___) with choice of 3 with 60% accuracy to build expressive vocabulary for conversation,    Patient will facilitate functional reading skills by answering written Yes/No questions with 60% accuracy to facilitate increased reading comprehension skills,    Patient will facilitate functional reading skills by providing an appropriate word for sentence completion task (e.g., open the ___) with 60% accuracy to facilitate increased expressive language skills,         WRITING  Patient will complete simple writing tasks at the word level with >60% acc to increase writing skills within FLE,     Patient will improve functional writing skills     SPEAKING  Patient will complete naming of automatic tasks (e.g., counting, listing days of the week/months of the year, etc.) with 60% accuracy to facilitate increased verbal expression skills,   8/29/24: Patient counted from 1-10 with 80% accuracy given min visual (orthographic cues). Removed orthographic cue, patient counted from 1-10 with 67% accuracy, IND. Accuracy increased to 100% given the orthographic cue.    Patient will imitate words/phrases using MADISON/tapping/pacing with 60% accuracy to facilitate  increased verbal expression skills,      Patient will name opposite (e.g., hot and ___) with 60% accuracy to build expressive vocabulary for conversation,   Patient will name a synonym (e.g., street or ___) with 60% accuracy to build expressive vocabulary for conversation,      Patient will name appropriate category for three words (e.g., apple, banana, pear = fruits) with 60% accuracy to facilitate improved generative naming skills,   Patient will provide an appropriate word for sentence completion task (e.g., open the ___) with 60% accuracy to facilitate increased expressive language skills,   Patient will provide an adequate response to generative naming task (i.e., name as many…) with 60% accuracy to facilitate increased expressive language skills,    Patient will provide an adequate response to confrontation naming task (i.e., what is…) with 60% accuracy to facilitate increased expressive language skills,   8/29/24: Patient IND identified common foods from a F.O. 2 pictures with 33% accuracy. Accuracy increased to 83% given max cues including models. Nine controlled targets given to practice for HEP.  Patient will name up to 5 features to describe a person/place/thing with 60% accuracy to facilitate improved utilization of circumlocution strategy,      Patient will complete picture description tasks using language organization strategies with 60% accuracy to facilitate improved utilization of circumlocution strategy,     Plan:  -Patient was provided with home exercises/activities to target goals in plan of care at the end of today's session.  -Continue with current plan of care.

## 2024-09-03 ENCOUNTER — APPOINTMENT (OUTPATIENT)
Age: 54
End: 2024-09-03
Payer: COMMERCIAL

## 2024-09-03 NOTE — PROGRESS NOTES
Speech-Language Pathology Re-Evaluation    Today's date: 9/3/2024 ***  Patient’s name: Severiano Ball  : 1970  MRN: [id number]  Safety measures: ***  Referring provider: Selina Perez,*    No diagnosis found.    Assessment:   Patient presents with ***      -***Copy & paste both short-term and long-term goals from from most recent evaluation report & make updates as necessary      Plan:  Patient would benefit from outpatient skilled Speech Therapy services: {BENEFIT FROM:96010}    Frequency: {FREQUENCY:46219}  Duration: {DURATION:97588}    Intervention certification from: 9/3/2024  Intervention certification to: ***      Subjective:  ***    Patient's goal(s): ***      Objective (testing):  ***      Treatment:  ***      Visit Tracking:  ***    Intervention comments:  ***

## 2024-09-05 ENCOUNTER — APPOINTMENT (OUTPATIENT)
Age: 54
End: 2024-09-05
Payer: COMMERCIAL

## 2024-09-09 ENCOUNTER — EVALUATION (OUTPATIENT)
Age: 54
End: 2024-09-09
Payer: COMMERCIAL

## 2024-09-09 ENCOUNTER — APPOINTMENT (OUTPATIENT)
Age: 54
End: 2024-09-09
Payer: COMMERCIAL

## 2024-09-09 DIAGNOSIS — I63.50 CEREBROVASCULAR ACCIDENT (CVA) DUE TO STENOSIS OF CEREBRAL ARTERY (HCC): ICD-10-CM

## 2024-09-09 DIAGNOSIS — I69.320 APHASIA AS LATE EFFECT OF CEREBROVASCULAR ACCIDENT (CVA): Primary | ICD-10-CM

## 2024-09-09 PROCEDURE — 92507 TX SP LANG VOICE COMM INDIV: CPT

## 2024-09-09 NOTE — PROGRESS NOTES
Speech-Language Pathology Re-Evaluation    Today's date: 2024   Patient’s name: Severiano Ball  : 1970  MRN: [id number]  Safety measures: acute cva   Referring provider: Selina Perez,*    Encounter Diagnosis     ICD-10-CM    1. 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 Cerebrovascular accident (CVA) due to stenosis of cerebral artery (HCC)  I63.50           Assessment:   Patient continues to present with severe global aphasia 2/2 acute CVA c/b deficits identifying and labeling common items, understanding basic and complex vocabulary and commands, relaying personal information, answering questions (Y/N, fixed choices, and open-ended) and contributing effectively and efficiently in conversation. Patient continues to produce longer spontaneous utterances during unstructured and structured conversations and tasks. However, patient continues to demonstrate significant paraphasias (phonemic/literal and semantic) and neologisms making the intended expressive message difficult to interpret. Patient benefits from multimodal cues to increase comprehension of the expectations of tasks, with an evident improvement in reading comprehension skills at the word and simple sentence level, as written/orthographic cues have been beneficial to patient last several sessions. Patient's family/caregiver reports it is increasingly difficult to understand patient's basic wants and needs and recently, he has been demonstrating an increase in emotional lability 2/2 frustrations with communication. Patient would benefit from continued outpatient skilled OP ST services to maximize expressive/receptive language skills s/p acute CVA to target improving current speech-language skills, promote positive communication interactions with both familiar & unfamiliar listeners, further education/training compensatory strategies, increase communication of wants/needs and expressive/receptive language  within functional activities, participate in meaningful activities, allow for increased socialization, promote safety, facilitate overall improved quality of life, and reduce caregiver burden.     Short Term Goals:  LISTENING  Patient will follow one-step verbal directions (e.g., close your eyes) with 60% accuracy to facilitate increased auditory comprehension skills, ONGOING     Patient will answer verbal Yes/No questions with 60% accuracy to facilitate increased auditory comprehension skills, ONGOING     READING  Patient will facilitate functional reading skills by matching word to picture with 60% accuracy over 5 sessions to facilitate growth of vocabulary concept comprehension,ONGOING     Patient will facilitate functional reading skills by matching word to phrase/function with 60% accuracy over 5 sessions to facilitate growth of vocabulary concept comprehension, ONGOING     Patient will facilitate functional reading skills by following one-step written directions (e.g., close your eyes) with 60% accuracy to facilitate increased reading comprehension skills, ONGOING     Patient will facilitate functional reading skills by naming opposites (e.g., hot and ___) with choice of 3 with 60% accuracy to build expressive vocabulary for conversation, ONGOING     Patient will facilitate functional reading skills by naming synonyms (e.g., street or ___) with choice of 3 with 60% accuracy to build expressive vocabulary for conversation,ONGOING     Patient will facilitate functional reading skills by answering written Yes/No questions with 60% accuracy to facilitate increased reading comprehension skills,ONGOING      Patient will facilitate functional reading skills by providing an appropriate word for sentence completion task (e.g., open the ___) with 60% accuracy to facilitate increased expressive language skills,ONGOING     WRITING  Patient will complete simple writing tasks at the word level with >60% acc to increase  writing skills within FLE, ONGOING    Patient will improve functional writing skills ONGOING    SPEAKING  Patient will complete naming of automatic tasks (e.g., counting, listing days of the week/months of the year, etc.) with 60% accuracy to facilitate increased verbal expression skills, ONGOING    Patient will imitate words/phrases using MADISON/tapping/pacing with 60% accuracy to facilitate increased verbal expression skills, ONGOING      Patient will name opposite (e.g., hot and ___) with 60% accuracy to build expressive vocabulary for conversation, DID NOT ADDRESS YET    Patient will name a synonym (e.g., street or ___) with 60% accuracy to build expressive vocabulary for conversation, DID NOT ADDRESS YET     Patient will name appropriate category for three words (e.g., apple, banana, pear = fruits) with 60% accuracy to facilitate improved generative naming skills,DID NOT ADDRESS YET     Patient will provide an appropriate word for sentence completion task (e.g., open the ___) with 60% accuracy to facilitate increased expressive language skills,DID NOT ADDRESS YET     Patient will provide an adequate response to generative naming task (i.e., name as many…) with 60% accuracy to facilitate increased expressive language skills, DID NOT ADDRESS YET     Patient will provide an adequate response to confrontation naming task (i.e., what is…) with 60% accuracy to facilitate increased expressive language skills, DID NOT ADDRESS YET     Patient will name up to 5 features to describe a person/place/thing with 60% accuracy to facilitate improved utilization of circumlocution strategy, DID NOT ADDRESS YET     Patient will complete picture description tasks using language organization strategies with 60% accuracy to facilitate improved utilization of circumlocution strategy, DID NOT ADDRESS YET           Plan:  Patient would benefit from outpatient skilled Speech Therapy services: Speech-language therapy    Frequency: 3x  "weekly  Duration: 1 month    Intervention certification from: 9/9/2024  Intervention certification to: 10/10/2024      Subjective:  Patient pleasant, engaged, and cooperative. Patient attended today's session unaccompanied. No new concerns reported.     Patient's goal(s): As per patient's partner, \"it seems like it's getting worse and everyone is frustrated by his inability to communicate\". As per patient interview, it is still frustrating to try to speak with other people.       Objective (testing):  No formal testing administered as today's session serves as a progress update.       Treatment:  Patient will follow one-step verbal directions (e.g., close your eyes) with 60% accuracy to facilitate increased auditory comprehension skills,  9/9/24: Patient IND receptively identified common foods in a F.O. 2 pictures with 70% accuracy. Accuracy increased to 100% given mod-max cues including semantic cues and clinician models. Given as HEP.     Patient will answer verbal Yes/No questions with 60% accuracy to facilitate increased auditory comprehension skills,  9/9/24: Patient answered basic Y/N questions with 50% accuracy given printed/orthographic/written cues. Accuracy increased to 100% given max cues including clinician models.     Patient will provide an adequate response to confrontation naming task (i.e., what is…) with 60% accuracy to facilitate increased expressive language skills,  9/9/24: Patient completed confrontational naming tasks with 40% accuracy given max cues including errorless learning/immediate clinician models. Given as HEP.         Visit Tracking:  POC expires Unit limit Auth Expiration date PT/OT + Visit Limit?   10/10/24    9/13/2024  18                                                 Intervention comments:  - 45 minutes speech/language tx   "

## 2024-09-11 ENCOUNTER — OFFICE VISIT (OUTPATIENT)
Age: 54
End: 2024-09-11
Payer: COMMERCIAL

## 2024-09-11 DIAGNOSIS — I69.320 APHASIA AS LATE EFFECT OF CEREBROVASCULAR ACCIDENT (CVA): Primary | ICD-10-CM

## 2024-09-11 DIAGNOSIS — I63.50 CEREBROVASCULAR ACCIDENT (CVA) DUE TO STENOSIS OF CEREBRAL ARTERY (HCC): ICD-10-CM

## 2024-09-11 PROCEDURE — 92507 TX SP LANG VOICE COMM INDIV: CPT

## 2024-09-11 NOTE — PROGRESS NOTES
Daily Speech Treatment Note    Today's date: 2024   Patient’s name: Severiano Ball  : 1970  MRN: [id number]  Safety measures:   Referring provider: Selina Perez,*    Encounter Diagnosis     ICD-10-CM    1. 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 Cerebrovascular accident (CVA) due to stenosis of cerebral artery (HCC)  I63.50         Visit Tracking:    Visit Tracking:  POC expires Unit limit Auth Expiration date PT/OT + Visit Limit?   10/10/24    2024  18        11/15/2024 18                                  Visit/Unit Tracking  AUTH Status:  Date 7/10  IE  7/11  7/15  7/16  7/23 7/24  8/1  8/5  8/6  8/8  PU    RE    yes Used 1  2  3  4  5 6  7  8  9  10  11  12  13 14 15 16 17 18 1     Remaining   17 16  15  14   13 12  11  10  9  8  7  6  5 4 3 2 1 0 17       Subjective/Behavioral:  -Patient reported no new concerns for today's session.      Objective/Assessment:       Short Term Goals:  LISTENING  Patient will follow one-step verbal directions (e.g., close your eyes) with 60% accuracy to facilitate increased auditory comprehension skills,      Patient will answer verbal Yes/No questions with 60% accuracy to facilitate increased auditory comprehension skills,      READING  Patient will facilitate functional reading skills by matching word to picture with 60% accuracy over 5 sessions to facilitate growth of vocabulary concept comprehension,     Patient will facilitate functional reading skills by matching word to phrase/function with 60% accuracy over 5 sessions to facilitate growth of vocabulary concept comprehension,      Patient will facilitate functional reading skills by following one-step written directions (e.g., close your eyes) with 60% accuracy to facilitate increased reading comprehension skills,      Patient will facilitate functional reading skills by naming opposites (e.g., hot and ___) with  "choice of 3 with 60% accuracy to build expressive vocabulary for conversation,      Patient will facilitate functional reading skills by naming synonyms (e.g., street or ___) with choice of 3 with 60% accuracy to build expressive vocabulary for conversation,     Patient will facilitate functional reading skills by answering written Yes/No questions with 60% accuracy to facilitate increased reading comprehension skills,      Patient will facilitate functional reading skills by providing an appropriate word for sentence completion task (e.g., open the ___) with 60% accuracy to facilitate increased expressive language skills,     WRITING  Patient will complete simple writing tasks at the word level with >60% acc to increase writing skills within FLE,     Patient will improve functional writing skills    SPEAKING  Patient will complete naming of automatic tasks (e.g., counting, listing days of the week/months of the year, etc.) with 60% accuracy to facilitate increased verbal expression skills,     Days of the week: Completed 2x independently with visual aid. Other attempts patient provided unintelligible words  Months of the year: Increased difficulties with tasks, patient Only able to verbally state October correctly. Rest of months were produced as \"zapper\" and patient was able to recognize wrong words, however, could not correctly change due to max verbal cues  Alphabet: Able to say A-M with only missing E & K with second attempt. However, after multiple attempts patient verbalized wrong letters with max verbal models, hand over hand cueing, gestures to visual aid of letters.    Patient will imitate words/phrases using MADISON/tapping/pacing with 60% accuracy to facilitate increased verbal expression skills,      Patient will name opposite (e.g., hot and ___) with 60% accuracy to build expressive vocabulary for conversation,    Attempted to provide opposites with 3 choices from given word: Completed with 0% accuracy " with max verbal models/cueing.    Patient will name a synonym (e.g., street or ___) with 60% accuracy to build expressive vocabulary for conversation,      Patient will name appropriate category for three words (e.g., apple, banana, pear = fruits) with 60% accuracy to facilitate improved generative naming skills,     Patient will provide an appropriate word for sentence completion task (e.g., open the ___) with 60% accuracy to facilitate increased expressive language skills,     Patient will provide an adequate response to generative naming task (i.e., name as many…) with 60% accuracy to facilitate increased expressive language skills,      Patient will provide an adequate response to confrontation naming task (i.e., what is…) with 60% accuracy to facilitate increased expressive language skills,      Patient will name up to 5 features to describe a person/place/thing with 60% accuracy to facilitate improved utilization of circumlocution strategy,      Patient will complete picture description tasks using language organization strategies with 60% accuracy to facilitate improved utilization of circumlocution strategy,     Plan:  -Patient was provided with home exercises/activities to target goals in plan of care at the end of today's session.  -Continue with current plan of care.

## 2024-09-12 NOTE — PROGRESS NOTES
"Daily Speech Treatment Note    Today's date: 2024   Patient’s name: Severiano Ball  : 1970  MRN: [id number]  Safety measures:   Referring provider: Selina Perez,*    Encounter Diagnosis     ICD-10-CM    1. 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 Cerebrovascular accident (CVA) due to stenosis of cerebral artery (HCC)  I63.50           Visit Tracking:    Visit Tracking:  POC expires Unit limit Auth Expiration date PT/OT + Visit Limit?   10/10/24    2024  18        11/15/2024 18                                  Visit/Unit Tracking  AUTH Status:  Date 7/10  IE  7/11  7/15  7/16  7/23 7/24  8/1  8/5  8/6  8/8  PU    RE    yes Used 1  2  3  4  5 6  7  8  9  10  11  12  13 14 15 16 17 18 1 2     Remaining   17 16  15  14   13 12  11  10  9  8  7  6  5 4 3 2 1 0 17 16       Subjective/Behavioral:  -Patient reported no new concerns for today's session.      Objective/Assessment:Increased spontaneous speech and clarity of speech. Examples of spontaneous speech: \"I should wait\" \"sorry about that\" \"say that again\" \"say that one more time\", \"feeling good.\"         Short Term Goals:  LISTENING  Patient will follow one-step verbal directions (e.g., close your eyes) with 60% accuracy to facilitate increased auditory comprehension skills,        Patient was given a visual field of 4 picture stimuli and to point to the given word by clinician. Task completed in 3/ opp (37% acc) independently, increasing to  opp (87% acc) from max cueing. Patient benefited from verbal/visual models      Increasing to visual field of 8, Patient was given a visual field of 4 additional picture stimuli and to point to the given word by clinician. Task completed in 15/32 opp (46% acc) independently, increasing to  opp (68% acc) from max cueing. Patient benefited from verbal/visual models.        Patient will answer verbal Yes/No " questions with 60% accuracy to facilitate increased auditory comprehension skills,      READING  Patient will facilitate functional reading skills by matching word to picture with 60% accuracy over 5 sessions to facilitate growth of vocabulary concept comprehension,     Patient will facilitate functional reading skills by matching word to phrase/function with 60% accuracy over 5 sessions to facilitate growth of vocabulary concept comprehension,      Patient will facilitate functional reading skills by following one-step written directions (e.g., close your eyes) with 60% accuracy to facilitate increased reading comprehension skills,      Patient will facilitate functional reading skills by naming opposites (e.g., hot and ___) with choice of 3 with 60% accuracy to build expressive vocabulary for conversation,      Patient will facilitate functional reading skills by naming synonyms (e.g., street or ___) with choice of 3 with 60% accuracy to build expressive vocabulary for conversation,     Patient will facilitate functional reading skills by answering written Yes/No questions with 60% accuracy to facilitate increased reading comprehension skills,      Patient will facilitate functional reading skills by providing an appropriate word for sentence completion task (e.g., open the ___) with 60% accuracy to facilitate increased expressive language skills,     WRITING  Patient will complete simple writing tasks at the word level with >60% acc to increase writing skills within FLE,     From auditory comprehension task, patient to write the picture stimuli from written model. Task completed with 75% accuracy independently. Noted for the spelling errors patient wrote letters upside down such as u for n, m for w. When clinician gestured to correct the word, patient was able to self-identify errors.     Patient will improve functional writing skills    SPEAKING  Patient will complete naming of automatic tasks (e.g., counting,  listing days of the week/months of the year, etc.) with 60% accuracy to facilitate increased verbal expression skills,     Patient will imitate words/phrases using MADISON/tapping/pacing with 60% accuracy to facilitate increased verbal expression skills,      Patient will name opposite (e.g., hot and ___) with 60% accuracy to build expressive vocabulary for conversation,        Patient will name a synonym (e.g., street or ___) with 60% accuracy to build expressive vocabulary for conversation,      Patient will name appropriate category for three words (e.g., apple, banana, pear = fruits) with 60% accuracy to facilitate improved generative naming skills,     Patient will provide an appropriate word for sentence completion task (e.g., open the ___) with 60% accuracy to facilitate increased expressive language skills,     Patient will provide an adequate response to generative naming task (i.e., name as many…) with 60% accuracy to facilitate increased expressive language skills,      Patient will provide an adequate response to confrontation naming task (i.e., what is…) with 60% accuracy to facilitate increased expressive language skills,      Patient will name up to 5 features to describe a person/place/thing with 60% accuracy to facilitate improved utilization of circumlocution strategy,      Patient will complete picture description tasks using language organization strategies with 60% accuracy to facilitate improved utilization of circumlocution strategy,     Plan:  -Patient was provided with home exercises/activities to target goals in plan of care at the end of today's session.  -Continue with current plan of care.

## 2024-09-13 ENCOUNTER — OFFICE VISIT (OUTPATIENT)
Age: 54
End: 2024-09-13
Payer: COMMERCIAL

## 2024-09-13 DIAGNOSIS — I69.320 APHASIA AS LATE EFFECT OF CEREBROVASCULAR ACCIDENT (CVA): Primary | ICD-10-CM

## 2024-09-13 DIAGNOSIS — I63.50 CEREBROVASCULAR ACCIDENT (CVA) DUE TO STENOSIS OF CEREBRAL ARTERY (HCC): ICD-10-CM

## 2024-09-13 PROCEDURE — 92507 TX SP LANG VOICE COMM INDIV: CPT

## 2024-09-13 NOTE — PROGRESS NOTES
Daily Speech Treatment Note    Today's date: 2024   Patient’s name: Severiano Ball  : 1970  MRN: [id number]  Safety measures:   Referring provider: Selina Perez,*    Encounter Diagnosis     ICD-10-CM    1. 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 Cerebrovascular accident (CVA) due to stenosis of cerebral artery (HCC)  I63.50             Visit Tracking:  POC expires Unit limit Auth Expiration date PT/OT + Visit Limit?   10/10/24    2024  18        11/15/2024 18                                  Visit/Unit Tracking  AUTH Status:  Date 7/10  IE  7/11  7/15  7/16  7/23 7/24  8/1  8/5  8/6  8/8  PU    RE    yes Used 1  2  3  4  5 6  7  8  9  10  11  12  13 14 15 16 17 18 1 2 3     Remaining   17 16  15  14   13 12  11  10  9  8  7  6  5 4 3 2 1 0 17 16 15       Subjective/Behavioral:  -Patient reported no new concerns for today's session.      Objective/Assessment:       Short Term Goals:  LISTENING  Patient will follow one-step verbal directions (e.g., close your eyes) with 60% accuracy to facilitate increased auditory comprehension skills,            Patient will answer verbal Yes/No questions with 60% accuracy to facilitate increased auditory comprehension skills,      READING  Patient will facilitate functional reading skills by matching word to picture with 60% accuracy over 5 sessions to facilitate growth of vocabulary concept comprehension,     Patient will facilitate functional reading skills by matching word to phrase/function with 60% accuracy over 5 sessions to facilitate growth of vocabulary concept comprehension,      Patient will facilitate functional reading skills by following one-step written directions (e.g., close your eyes) with 60% accuracy to facilitate increased reading comprehension skills,        Patient will facilitate functional reading skills by naming opposites (e.g., hot and  ___) with choice of 3 with 60% accuracy to build expressive vocabulary for conversation,      Patient will facilitate functional reading skills by naming synonyms (e.g., street or ___) with choice of 3 with 60% accuracy to build expressive vocabulary for conversation,     Patient will facilitate functional reading skills by answering written Yes/No questions with 60% accuracy to facilitate increased reading comprehension skills,      Patient will facilitate functional reading skills by providing an appropriate word for sentence completion task (e.g., open the ___) with 60% accuracy to facilitate increased expressive language skills,       WRITING  Patient will complete simple writing tasks at the word level with >60% acc to increase writing skills within FLE,       Patient will improve functional writing skills    SPEAKING  Patient will complete naming of automatic tasks (e.g., counting, listing days of the week/months of the year, etc.) with 60% accuracy to facilitate increased verbal expression skills,     Patient will imitate words/phrases using MADISON/tapping/pacing with 60% accuracy to facilitate increased verbal expression skills,      Patient will name opposite (e.g., hot and ___) with 60% accuracy to build expressive vocabulary for conversation,        Patient will name a synonym (e.g., street or ___) with 60% accuracy to build expressive vocabulary for conversation,      Patient will name appropriate category for three words (e.g., apple, banana, pear = fruits) with 60% accuracy to facilitate improved generative naming skills,     Patient will provide an appropriate word for sentence completion task (e.g., open the ___) with 60% accuracy to facilitate increased expressive language skills,     Patient will provide an adequate response to generative naming task (i.e., name as many…) with 60% accuracy to facilitate increased expressive language skills,      Patient will provide an adequate response to  confrontation naming task (i.e., what is…) with 60% accuracy to facilitate increased expressive language skills,      Patient was provided tangible objects and had to identify the object. Task completed in 2/10 opp (20% acc) independently, increasing to 10/10 opp (100% acc) from max cueing. Patient benefited from semantic/visual cues.    From tangible word, patient to write object with from visual model and identify object. Task completed in 7/10 opp (70% acc) independently, increasing to 10/10 opp (100% acc) from max cueing. Patient benefited from visual cues and errorless learning.     Reviewed all the written words and objects, patient to identify written words from auditory/visual stimuli of object. Task completed with 60% accuracy with max verbal models and tactile cueing.    Patient will name up to 5 features to describe a person/place/thing with 60% accuracy to facilitate improved utilization of circumlocution strategy,      Patient will complete picture description tasks using language organization strategies with 60% accuracy to facilitate improved utilization of circumlocution strategy,     Plan:  -Patient was provided with home exercises/activities to target goals in plan of care at the end of today's session.  -Continue with current plan of care.

## 2024-09-16 ENCOUNTER — OFFICE VISIT (OUTPATIENT)
Age: 54
End: 2024-09-16
Payer: COMMERCIAL

## 2024-09-16 DIAGNOSIS — I69.320 APHASIA AS LATE EFFECT OF CEREBROVASCULAR ACCIDENT (CVA): Primary | ICD-10-CM

## 2024-09-16 DIAGNOSIS — I63.50 CEREBROVASCULAR ACCIDENT (CVA) DUE TO STENOSIS OF CEREBRAL ARTERY (HCC): ICD-10-CM

## 2024-09-16 PROCEDURE — 92507 TX SP LANG VOICE COMM INDIV: CPT

## 2024-09-17 NOTE — PROGRESS NOTES
Daily Speech Treatment Note    Today's date: 2024   Patient’s name: Severiano Ball  : 1970  MRN: [id number]  Safety measures:   Referring provider: Selina Perez,*    Encounter Diagnosis     ICD-10-CM    1. 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 Cerebrovascular accident (CVA) due to stenosis of cerebral artery (HCC)  I63.50               Visit Tracking:  POC expires Unit limit Auth Expiration date PT/OT + Visit Limit?   10/10/24    2024  18        11/15/2024 18                                  Visit/Unit Tracking  AUTH Status:  Date 7/10  IE  7/11  7/15  7/16  7/23 7/24  8/1  8/5  8/6  8/8  PU    RE    yes Used 1  2  3  4  5 6  7  8  9  10  11  12  13 14 15 16 17 18 1 2 3 4     Remaining   17 16  15  14   13 12  11  10  9  8  7  6  5 4 3 2 1 0 17 16 15 14       Subjective/Behavioral:  -Patient reported no new concerns for today's session.      Objective/Assessment:       Short Term Goals:  LISTENING  Patient will follow one-step verbal directions (e.g., close your eyes) with 60% accuracy to facilitate increased auditory comprehension skills,        Patient was given a VF of 4 pictures stimuli and had to identify the word according to clinician's request. Task completed in  opp (75% acc) independently, increasing to  opp (% acc) from min cueing. Patient benefited from verbal/visual models.        Patient was given a VF of 6 pictures stimuli and had to identify the word according to clinician's request. Task completed in  opp (38% acc)  from mod-max cueing. Patient benefited from verbal/visual models.      Patient will answer verbal Yes/No questions with 60% accuracy to facilitate increased auditory comprehension skills,      READING  Patient will facilitate functional reading skills by matching word to picture with 60% accuracy over 5 sessions to facilitate growth of vocabulary concept  comprehension,     Patient will facilitate functional reading skills by matching word to phrase/function with 60% accuracy over 5 sessions to facilitate growth of vocabulary concept comprehension,      Patient will facilitate functional reading skills by following one-step written directions (e.g., close your eyes) with 60% accuracy to facilitate increased reading comprehension skills,        Patient will facilitate functional reading skills by naming opposites (e.g., hot and ___) with choice of 3 with 60% accuracy to build expressive vocabulary for conversation,      Patient will facilitate functional reading skills by naming synonyms (e.g., street or ___) with choice of 3 with 60% accuracy to build expressive vocabulary for conversation,     Patient will facilitate functional reading skills by answering written Yes/No questions with 60% accuracy to facilitate increased reading comprehension skills,      Patient will facilitate functional reading skills by providing an appropriate word for sentence completion task (e.g., open the ___) with 60% accuracy to facilitate increased expressive language skills,       WRITING  Patient will complete simple writing tasks at the word level with >60% acc to increase writing skills within FLE,     Writing the months of the year: Patient able to write months adequately with 5 spelling errors total and patient able to self-identify errors!    Patient will improve functional writing skills    SPEAKING  Patient will complete naming of automatic tasks (e.g., counting, listing days of the week/months of the year, etc.) with 60% accuracy to facilitate increased verbal expression skills,     Months of the year: initially, patient was able to verbalize 10 months of the year with min verbal models, however, after multiple attempts patient was able to verbalize all months of the year accurately!    Patient will imitate words/phrases using MADISON/tapping/pacing with 60% accuracy to facilitate  increased verbal expression skills,      Patient will name opposite (e.g., hot and ___) with 60% accuracy to build expressive vocabulary for conversation,        Patient will name a synonym (e.g., street or ___) with 60% accuracy to build expressive vocabulary for conversation,      Patient will name appropriate category for three words (e.g., apple, banana, pear = fruits) with 60% accuracy to facilitate improved generative naming skills,     Patient will provide an appropriate word for sentence completion task (e.g., open the ___) with 60% accuracy to facilitate increased expressive language skills,     Patient will provide an adequate response to generative naming task (i.e., name as many…) with 60% accuracy to facilitate increased expressive language skills,      Patient will provide an adequate response to confrontation naming task (i.e., what is…) with 60% accuracy to facilitate increased expressive language skills,        Patient will name up to 5 features to describe a person/place/thing with 60% accuracy to facilitate improved utilization of circumlocution strategy,      Patient will complete picture description tasks using language organization strategies with 60% accuracy to facilitate improved utilization of circumlocution strategy,     Plan:  -Patient was provided with home exercises/activities to target goals in plan of care at the end of today's session.  -Continue with current plan of care.

## 2024-09-18 ENCOUNTER — OFFICE VISIT (OUTPATIENT)
Age: 54
End: 2024-09-18
Payer: COMMERCIAL

## 2024-09-18 DIAGNOSIS — I63.50 CEREBROVASCULAR ACCIDENT (CVA) DUE TO STENOSIS OF CEREBRAL ARTERY (HCC): ICD-10-CM

## 2024-09-18 DIAGNOSIS — I69.320 APHASIA AS LATE EFFECT OF CEREBROVASCULAR ACCIDENT (CVA): Primary | ICD-10-CM

## 2024-09-18 PROCEDURE — 92507 TX SP LANG VOICE COMM INDIV: CPT

## 2024-09-20 ENCOUNTER — OFFICE VISIT (OUTPATIENT)
Age: 54
End: 2024-09-20
Payer: COMMERCIAL

## 2024-09-20 DIAGNOSIS — I63.50 CEREBROVASCULAR ACCIDENT (CVA) DUE TO STENOSIS OF CEREBRAL ARTERY (HCC): ICD-10-CM

## 2024-09-20 DIAGNOSIS — I69.320 APHASIA AS LATE EFFECT OF CEREBROVASCULAR ACCIDENT (CVA): Primary | ICD-10-CM

## 2024-09-20 PROCEDURE — 92507 TX SP LANG VOICE COMM INDIV: CPT

## 2024-09-20 NOTE — PROGRESS NOTES
Daily Speech Treatment Note    Today's date: 2024   Patient’s name: Severiano Ball  : 1970  MRN: [id number]  Safety measures:   Referring provider: Selina Perez,*    Encounter Diagnosis     ICD-10-CM    1. 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 Cerebrovascular accident (CVA) due to stenosis of cerebral artery (HCC)  I63.50                 Visit Tracking:  POC expires Unit limit Auth Expiration date PT/OT + Visit Limit?   10/10/24    2024  18        11/15/2024 18                                  Visit/Unit Tracking  AUTH Status:  Date 7/10  IE  7/11  7/15  7/16  7/23 7/24  8/1  8/5  8/6  8/8  PU    RE    yes Used 1  2  3  4  5 6  7  8  9  10  11  12  13 14 15 16 17 18 1 2 3 4 5     Remaining   17 16  15  14   13 12  11  10  9  8  7  6  5 4 3 2 1 0 17 16 15 14 13       Subjective/Behavioral:  -Patient reported no new concerns for today's session.      Objective/Assessment:       Short Term Goals:  LISTENING  Patient will follow one-step verbal directions (e.g., close your eyes) with 60% accuracy to facilitate increased auditory comprehension skills,          Patient will answer verbal Yes/No questions with 60% accuracy to facilitate increased auditory comprehension skills,      Following directions task: patient was given an object and had to match the object to the word from a choice of 3 objects. Task completed in 5/10 opp (10% acc) independently, increasing to 8/10 opp (80% acc) from max cueing. Patient benefited from verbal repetition and elimination of choices.    Increasing the difficulty of task above: Task completed in 3/10 opp (30% acc) independently, increasing to 10/10 opp (100% acc) from max cueing. Patient benefited from verbal repetition and elimination of choices.    READING  Patient will facilitate functional reading skills by matching word to picture with 60% accuracy over 5  sessions to facilitate growth of vocabulary concept comprehension,     Patient will facilitate functional reading skills by matching word to phrase/function with 60% accuracy over 5 sessions to facilitate growth of vocabulary concept comprehension,      Patient will facilitate functional reading skills by following one-step written directions (e.g., close your eyes) with 60% accuracy to facilitate increased reading comprehension skills,        Patient will facilitate functional reading skills by naming opposites (e.g., hot and ___) with choice of 3 with 60% accuracy to build expressive vocabulary for conversation,      Patient will facilitate functional reading skills by naming synonyms (e.g., street or ___) with choice of 3 with 60% accuracy to build expressive vocabulary for conversation,     Patient will facilitate functional reading skills by answering written Yes/No questions with 60% accuracy to facilitate increased reading comprehension skills,      Patient will facilitate functional reading skills by providing an appropriate word for sentence completion task (e.g., open the ___) with 60% accuracy to facilitate increased expressive language skills,       WRITING  Patient will complete simple writing tasks at the word level with >60% acc to increase writing skills within FLE,       Patient will improve functional writing skills    SPEAKING  Patient will complete naming of automatic tasks (e.g., counting, listing days of the week/months of the year, etc.) with 60% accuracy to facilitate increased verbal expression skills,   Months of the year: initially, patient was able to verbalize 10 months of the year, however, on 3rd attempt patient was able to verbalize all 12 months of the year. However, difficulties with consistency of task. Noted patient combined July and August together and skipped over a few months. For the days of the week task, patient was able to verbalize days of the week 3x however, inconsistent  and skipped over a few months.      Patient will imitate words/phrases using MADISON/tapping/pacing with 60% accuracy to facilitate increased verbal expression skills,      Patient will name opposite (e.g., hot and ___) with 60% accuracy to build expressive vocabulary for conversation,        Patient will name a synonym (e.g., street or ___) with 60% accuracy to build expressive vocabulary for conversation,      Patient will name appropriate category for three words (e.g., apple, banana, pear = fruits) with 60% accuracy to facilitate improved generative naming skills,     Patient will provide an appropriate word for sentence completion task (e.g., open the ___) with 60% accuracy to facilitate increased expressive language skills,     Sentence completion task: patient was given a sentence and 3 choice to fill in the blank. Task completed in 3/10 opp (30% acc) independently, increasing to 10/10 opp (100% acc) from max cueing.  Patient benefited from verbal repetition, visual cues, and elimination of choices.    Patient will provide an adequate response to generative naming task (i.e., name as many…) with 60% accuracy to facilitate increased expressive language skills,      Patient will provide an adequate response to confrontation naming task (i.e., what is…) with 60% accuracy to facilitate increased expressive language skills,        Patient will name up to 5 features to describe a person/place/thing with 60% accuracy to facilitate improved utilization of circumlocution strategy,      Patient will complete picture description tasks using language organization strategies with 60% accuracy to facilitate improved utilization of circumlocution strategy,     Plan:  -Patient was provided with home exercises/activities to target goals in plan of care at the end of today's session.  -Continue with current plan of care.

## 2024-09-23 ENCOUNTER — OFFICE VISIT (OUTPATIENT)
Age: 54
End: 2024-09-23
Payer: COMMERCIAL

## 2024-09-23 DIAGNOSIS — I69.320 APHASIA AS LATE EFFECT OF CEREBROVASCULAR ACCIDENT (CVA): Primary | ICD-10-CM

## 2024-09-23 DIAGNOSIS — I63.50 CEREBROVASCULAR ACCIDENT (CVA) DUE TO STENOSIS OF CEREBRAL ARTERY (HCC): ICD-10-CM

## 2024-09-23 PROCEDURE — 92507 TX SP LANG VOICE COMM INDIV: CPT

## 2024-09-23 NOTE — PROGRESS NOTES
Daily Speech Treatment Note    Today's date: 2024   Patient’s name: Severiano Ball  : 1970  MRN: [id number]  Safety measures:   Referring provider: Selina Perez,*    Encounter Diagnosis     ICD-10-CM    1. 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 Cerebrovascular accident (CVA) due to stenosis of cerebral artery (HCC)  I63.50               Visit Tracking:  POC expires Unit limit Auth Expiration date PT/OT + Visit Limit?   10/10/24    2024  18        11/15/2024 18                                  Visit/Unit Tracking  AUTH Status:  Date 7/10  IE  7/11  7/15  7/16  7/23 7/24  8/1  8/5  8/6  8/8  PU    RE    yes Used 1  2  3  4  5 6  7  8  9  10  11  12  13 14 15 16 17 18 1 2 3 4 5     Remaining   17 16  15  14   13 12  11  10  9  8  7  6  5 4 3 2 1 0 17 16 15 14 13   AUTH Status:  Date                          yes Used 6                          Remaining 12                             Subjective/Behavioral:  -Patient pleasant, engaged, and cooperative. Patient attended today's session unaccompanied. No new concerns reported.       Objective/Assessment: Patient presented with increase in neologisms today. Patient required mod verbal cues to repeat answers 2/2 low volume.        Short Term Goals:  LISTENING  Patient will follow one-step verbal directions (e.g., close your eyes) with 60% accuracy to facilitate increased auditory comprehension skills,     Patient will answer verbal Yes/No questions with 60% accuracy to facilitate increased auditory comprehension skills,        READING  Patient will facilitate functional reading skills by matching word to picture with 60% accuracy over 5 sessions to facilitate growth of vocabulary concept comprehension,     Patient will facilitate functional reading skills by matching word to phrase/function with 60% accuracy over 5 sessions to facilitate growth  of vocabulary concept comprehension,      Patient will facilitate functional reading skills by following one-step written directions (e.g., close your eyes) with 60% accuracy to facilitate increased reading comprehension skills,        Patient will facilitate functional reading skills by naming opposites (e.g., hot and ___) with choice of 3 with 60% accuracy to build expressive vocabulary for conversation,      Patient will facilitate functional reading skills by naming synonyms (e.g., street or ___) with choice of 3 with 60% accuracy to build expressive vocabulary for conversation,     Patient will facilitate functional reading skills by answering written Yes/No questions with 60% accuracy to facilitate increased reading comprehension skills,   9/23/24: Patient answered written Y/N questions with 0% accuracy, IND. Accuracy increased to 50% given max cues including models. Given as HEP.    Patient will facilitate functional reading skills by providing an appropriate word for sentence completion task (e.g., open the ___) with 60% accuracy to facilitate increased expressive language skills,       WRITING  Patient will complete simple writing tasks at the word level with >60% acc to increase writing skills within FLE,       Patient will improve functional writing skills    SPEAKING  Patient will complete naming of automatic tasks (e.g., counting, listing days of the week/months of the year, etc.) with 60% accuracy to facilitate increased verbal expression skills,     Patient will imitate words/phrases using MADISON/tapping/pacing with 60% accuracy to facilitate increased verbal expression skills,      Patient will name opposite (e.g., hot and ___) with 60% accuracy to build expressive vocabulary for conversation,    Patient will name a synonym (e.g., street or ___) with 60% accuracy to build expressive vocabulary for conversation,      Patient will name appropriate category for three words (e.g., apple, banana, pear =  fruits) with 60% accuracy to facilitate improved generative naming skills,  9/23/24: Patient names the appropriate category for 3 given pictures with 14% accuracy given max cues - visual/orthographic, verbal, and models. Patient demonstrated reduced comprehension of task and required several repeated demonstrations throughout. Given as HEP.   Patient will provide an appropriate word for sentence completion task (e.g., open the ___) with 60% accuracy to facilitate increased expressive language skills,    Patient will provide an adequate response to generative naming task (i.e., name as many…) with 60% accuracy to facilitate increased expressive language skills,      Patient will provide an adequate response to confrontation naming task (i.e., what is…) with 60% accuracy to facilitate increased expressive language skills,   9/23/24: Patient completed confrontational naming tasks with 55% accuracy given max cues including orthographic cues and clinician models. Significantly impaired repetition observed today.     Patient will name up to 5 features to describe a person/place/thing with 60% accuracy to facilitate improved utilization of circumlocution strategy,      Patient will complete picture description tasks using language organization strategies with 60% accuracy to facilitate improved utilization of circumlocution strategy,     Plan:  -Patient was provided with home exercises/activities to target goals in plan of care at the end of today's session.  -Continue with current plan of care.

## 2024-09-25 ENCOUNTER — APPOINTMENT (OUTPATIENT)
Age: 54
End: 2024-09-25
Payer: COMMERCIAL

## 2024-09-25 NOTE — PROGRESS NOTES
Daily Speech Treatment Note    Today's date: 2024   Patient’s name: Severiano Ball  : 1970  MRN: [id number]  Safety measures:   Referring provider: Selina Perez,*    No diagnosis found.          Visit Tracking:  POC expires Unit limit Auth Expiration date PT/OT + Visit Limit?   10/10/24    2024  18        11/15/2024 18                                  Visit/Unit Tracking  AUTH Status:  Date 7/10  IE  7/11  7/15  7/16  7/23 7/24  8  8  PU    RE    yes Used 1  2  3  4  5 6  7  8  9  10  11  12  13 14 15 16 17 18 1 2 3 4 5     Remaining   17 16  15  14   13 12  11  10  9  8  7  6  5 4 3 2 1 0 17 16 15 14 13   AUTH Status:  Date                          yes Used 6                          Remaining 12                             Subjective/Behavioral:  -Patient pleasant, engaged, and cooperative. Patient attended today's session unaccompanied. No new concerns reported.       Objective/Assessment: Patient presented with increase in neologisms today. Patient required mod verbal cues to repeat answers 2/2 low volume.        Short Term Goals:  LISTENING  Patient will follow one-step verbal directions (e.g., close your eyes) with 60% accuracy to facilitate increased auditory comprehension skills,     Patient will answer verbal Yes/No questions with 60% accuracy to facilitate increased auditory comprehension skills,        READING  Patient will facilitate functional reading skills by matching word to picture with 60% accuracy over 5 sessions to facilitate growth of vocabulary concept comprehension,     Patient will facilitate functional reading skills by matching word to phrase/function with 60% accuracy over 5 sessions to facilitate growth of vocabulary concept comprehension,      Patient will facilitate functional reading skills by following one-step written directions (e.g., close your eyes) with 60% accuracy to  facilitate increased reading comprehension skills,        Patient will facilitate functional reading skills by naming opposites (e.g., hot and ___) with choice of 3 with 60% accuracy to build expressive vocabulary for conversation,      Patient will facilitate functional reading skills by naming synonyms (e.g., street or ___) with choice of 3 with 60% accuracy to build expressive vocabulary for conversation,     Patient will facilitate functional reading skills by answering written Yes/No questions with 60% accuracy to facilitate increased reading comprehension skills,   9/23/24: Patient answered written Y/N questions with 0% accuracy, IND. Accuracy increased to 50% given max cues including models. Given as HEP.    Patient will facilitate functional reading skills by providing an appropriate word for sentence completion task (e.g., open the ___) with 60% accuracy to facilitate increased expressive language skills,       WRITING  Patient will complete simple writing tasks at the word level with >60% acc to increase writing skills within FLE,       Patient will improve functional writing skills    SPEAKING  Patient will complete naming of automatic tasks (e.g., counting, listing days of the week/months of the year, etc.) with 60% accuracy to facilitate increased verbal expression skills,     Patient will imitate words/phrases using MADISON/tapping/pacing with 60% accuracy to facilitate increased verbal expression skills,      Patient will name opposite (e.g., hot and ___) with 60% accuracy to build expressive vocabulary for conversation,    Patient will name a synonym (e.g., street or ___) with 60% accuracy to build expressive vocabulary for conversation,      Patient will name appropriate category for three words (e.g., apple, banana, pear = fruits) with 60% accuracy to facilitate improved generative naming skills,  9/23/24: Patient names the appropriate category for 3 given pictures with 14% accuracy given max cues -  visual/orthographic, verbal, and models. Patient demonstrated reduced comprehension of task and required several repeated demonstrations throughout. Given as HEP.   Patient will provide an appropriate word for sentence completion task (e.g., open the ___) with 60% accuracy to facilitate increased expressive language skills,    Patient will provide an adequate response to generative naming task (i.e., name as many…) with 60% accuracy to facilitate increased expressive language skills,      Patient will provide an adequate response to confrontation naming task (i.e., what is…) with 60% accuracy to facilitate increased expressive language skills,   9/23/24: Patient completed confrontational naming tasks with 55% accuracy given max cues including orthographic cues and clinician models. Significantly impaired repetition observed today.     Patient will name up to 5 features to describe a person/place/thing with 60% accuracy to facilitate improved utilization of circumlocution strategy,      Patient will complete picture description tasks using language organization strategies with 60% accuracy to facilitate improved utilization of circumlocution strategy,     Plan:  -Patient was provided with home exercises/activities to target goals in plan of care at the end of today's session.  -Continue with current plan of care.

## 2024-09-27 ENCOUNTER — APPOINTMENT (OUTPATIENT)
Age: 54
End: 2024-09-27
Payer: COMMERCIAL

## 2024-09-30 ENCOUNTER — APPOINTMENT (OUTPATIENT)
Age: 54
End: 2024-09-30
Payer: COMMERCIAL

## 2024-10-21 ENCOUNTER — CONSULT (OUTPATIENT)
Dept: NEUROLOGY | Facility: CLINIC | Age: 54
End: 2024-10-21
Payer: COMMERCIAL

## 2024-10-21 VITALS
BODY MASS INDEX: 25.05 KG/M2 | HEIGHT: 70 IN | HEART RATE: 71 BPM | WEIGHT: 175 LBS | OXYGEN SATURATION: 95 % | DIASTOLIC BLOOD PRESSURE: 90 MMHG | SYSTOLIC BLOOD PRESSURE: 140 MMHG

## 2024-10-21 DIAGNOSIS — I63.50 CEREBROVASCULAR ACCIDENT (CVA) DUE TO STENOSIS OF CEREBRAL ARTERY (HCC): ICD-10-CM

## 2024-10-21 PROCEDURE — 99205 OFFICE O/P NEW HI 60 MIN: CPT | Performed by: STUDENT IN AN ORGANIZED HEALTH CARE EDUCATION/TRAINING PROGRAM

## 2024-10-21 RX ORDER — ATORVASTATIN CALCIUM 40 MG/1
40 TABLET, FILM COATED ORAL DAILY
Qty: 30 TABLET | Refills: 5 | Status: SHIPPED | OUTPATIENT
Start: 2024-10-21 | End: 2025-04-19

## 2024-10-21 NOTE — ASSESSMENT & PLAN NOTE
- restart aspirin and statin (will try atorvastatin; prior allergic reaction while on rosuvastatin)  - will obtain records from [place]  - continue PT, OT, SLP  - smoking cessation   - Goal SBP <130/90  - Goal LDL <70  - f/u with NSGY in [place]     Orders:    atorvastatin (LIPITOR) 40 mg tablet; Take 1 tablet (40 mg total) by mouth daily

## 2024-10-21 NOTE — PROGRESS NOTES
Neurology Ambulatory Visit  Name: Severiano Ball       : 1970       MRN: [id number]   Encounter Provider: Linda Victor MD   Encounter Date: 10/21/2024  Encounter department: [facility]    Severiano Ball is a 54 y.o. male with PMH of asthma and HLD who presents as new consultation for stroke.     He presented in  to [facility] in [place], [place] with R-sided weakness and speech impairment. He had a stent placed (probably left carotid) and was subsequently on ASA + Plavix + statin, but had an allergic reaction in July and stopped Plavix. This was meant to be switched to Brilinta but the patient actually stopped all of his medication at the time.     It is unclear how long he was meant to be on DAPT but on discussion with my stroke colleagues this is typically between 1-3 months for carotid stents. For now he should restart ASA + statin and follow-up with neurosurgery in [place]. He has an appointment upcoming at the end of October.     On exam, he has a significant receptive aphasia without weakness.     Assessment & Plan  Cerebrovascular accident (CVA) due to stenosis of cerebral artery (HCC)  - restart aspirin and statin (will try atorvastatin; prior allergic reaction while on rosuvastatin)  - will obtain records from [place]  - continue PT, OT, SLP  - smoking cessation   - Goal SBP <130/90  - Goal LDL <70  - f/u with NSGY in [place]     Orders:    atorvastatin (LIPITOR) 40 mg tablet; Take 1 tablet (40 mg total) by mouth daily    RTC 3 mo        HISTORY OF PRESENT ILLNESS    He is severely aphasic and history is provided by his wife. He had a stroke in . He was in [place] at the time. His friends found him unconscious. In the hospital ([facility]), they were told he had a stroke and that he needed a stent. He had right arm and leg weakness and trouble speaking. Since then, his weakness resolved but the speech did not improve. He is  reportedly having a lot of pain which feels like burning and allodynia.     He was supposed to be taking aspirin and Plavix (reportedly) but these were stopped after he presented to the ED with an allergic reaction. He also stopped rosuvastatinThey are not sure what medication this reaction was from but they think it was Plavix. He is currently only taking fish oil. He continues to have a rash on his face.     He is undergoing PT, OT and SLP.     Prior Work-up:   Unknown    Past Medical History:    Past Medical History:   Diagnosis Date    Asthma     Erectile dysfunction     GERD (gastroesophageal reflux disease)     Hyperlipidemia     Kidney stone     Psoriasis     Squamous cell skin cancer 01/31/2023    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     Past Surgical History:   Procedure Laterality Date    COLONOSCOPY      HAND SURGERY Right     HERNIA REPAIR Bilateral 11/17/2021    Procedure: REPAIR HERNIA INGUINAL, LAPAROSCOPIC BILATERAL WITH MESH, TRANS ABDOMINAL;  Surgeon: Melvin Marquez MD;  Location: [facility];  Service: General    MOHS SURGERY  05/10/2023    Right cheek-Dr. Marie    CO ADJT TIS TRNS/REARGMT F/C/C/M/N/A/G/H/F 10SQCM/< Right 5/11/2023    Procedure: RECONSTRUCTION OF MOHS DEFECT RIGHT CHEEK WITH LOCAL FLAP;  Surgeon: Hola Tan MD;  Location: [facility];  Service: Plastics    SKIN BIOPSY      TONSILLECTOMY      WISDOM TOOTH EXTRACTION      teeth extraction       Family History:  Family History   Problem Relation Age of Onset    Diabetes Mother     Prostate cancer Father        Social History:  Social History     Tobacco Use    Smoking status: Every Day     Current packs/day: 1.50     Average packs/day: 1.5 packs/day for 35.0 years (52.5 ttl pk-yrs)     Types: Cigarettes    Smokeless tobacco: Former   Vaping Use    Vaping status: Former    Quit date: 6/9/1995   Substance Use Topics    Alcohol use: Not Currently    Drug use: Not Currently     Frequency: 7.0 times per week     Types: Marijuana      "Comment: Medical Marijuana        Allergies:  No Known Allergies    Medications:    Current Outpatient Medications:     atorvastatin (LIPITOR) 40 mg tablet, Take 1 tablet (40 mg total) by mouth daily, Disp: 30 tablet, Rfl: 5      OBJECTIVE  /90 (BP Location: Left arm, Patient Position: Sitting, Cuff Size: Standard)   Pulse 71   Ht 5' 10\" (1.778 m)   Wt 79.4 kg (175 lb)   SpO2 95%   BMI 25.11 kg/m²      Labs  I have reviewed pertinent labs:  CBC:   Lab Results   Component Value Date    WBC 13.03 (H) 07/04/2024    RBC 4.62 07/04/2024    HGB 14.5 07/04/2024    HCT 43.8 07/04/2024    MCV 95 07/04/2024     07/04/2024    MCH 31.4 07/04/2024    MCHC 33.1 07/04/2024    RDW 13.9 07/04/2024    MPV 8.8 (L) 07/04/2024    NEUTROABS 10.63 (H) 07/04/2024     CMP:   Lab Results   Component Value Date    SODIUM 138 07/04/2024    K 4.7 07/04/2024     07/04/2024    CO2 23 07/04/2024    AGAP 7 07/04/2024    BUN 18 07/04/2024    CREATININE 1.12 07/04/2024    GLUC 113 07/04/2024    GLUF 94 05/08/2023    CALCIUM 8.6 07/04/2024    AST 16 07/04/2024    ALT 12 07/04/2024    ALKPHOS 78 07/04/2024    TP 6.7 07/04/2024    ALB 3.6 07/04/2024    TBILI 0.80 07/04/2024    EGFR 73 07/04/2024          General Exam  GENERAL APPEARANCE:  No distress, alert, interactive and cooperative.  HEENT: Maculopapular rash diffusely over his face, scalp, and neck.   CARDIOVASCULAR: Warm and well perfused  LUNGS: normal work of breathing on room air  EXTREMITIES: no peripheral edema     Neurologic Exam  MENTAL STATE:  Severe receptive aphasia. Does not understand simple commands. Cannot say his name. Speech is fluent but without semantic meaning.     CRANIAL NERVES:  CN 3, 4, 6  Pupils round, 3 mm in diameter. Lids symmetric; no ptosis. EOMs normal alignment, full range. No nystagmus.  CN 7  No facial droop  CN 8  Hearing intact to conversation.     MOTOR:  Muscle bulk and tone were normal in both upper and lower extremities. Antigravity in " all extremities.      REFLEXES:  Unable to elicit as patient is not relaxing.      SENSORY:  Reacts to being touched in all extremities.      GAIT:  Station was stable with a normal base. Gait was stable with a normal arm swing and speed.     Administrative Statements   The total amount of time spent with the patient and on chart review and documentation was 45 minutes. Issues addressed during this clinic visit included overall management, medication counseling or monitoring, and counseling and coordination of care.     Decision making was high-complexity due to chronic illness that poses a threat to bodily function, review of labwork, assessment requirement independent historian, and discussion of management with external provider.

## 2024-11-07 ENCOUNTER — TELEPHONE (OUTPATIENT)
Age: 54
End: 2024-11-07

## 2024-11-07 NOTE — TELEPHONE ENCOUNTER
Wife called asking if a test can be ordered that the surgeon in [facility] wants done, he has seen Pretty and Dr Hernandez since having this surgery and is willing to come into see wither one of them to see if this test can be ordered and have it done locally. Please advise

## 2024-12-20 ENCOUNTER — OFFICE VISIT (OUTPATIENT)
Dept: FAMILY MEDICINE CLINIC | Facility: CLINIC | Age: 54
End: 2024-12-20
Payer: COMMERCIAL

## 2024-12-20 VITALS
HEART RATE: 60 BPM | SYSTOLIC BLOOD PRESSURE: 112 MMHG | WEIGHT: 180 LBS | BODY MASS INDEX: 25.77 KG/M2 | TEMPERATURE: 98 F | HEIGHT: 70 IN | DIASTOLIC BLOOD PRESSURE: 84 MMHG | OXYGEN SATURATION: 96 %

## 2024-12-20 DIAGNOSIS — Z12.5 SCREENING FOR PROSTATE CANCER: ICD-10-CM

## 2024-12-20 DIAGNOSIS — E78.2 MIXED HYPERLIPIDEMIA: ICD-10-CM

## 2024-12-20 DIAGNOSIS — Z86.0100 HISTORY OF COLON POLYPS: ICD-10-CM

## 2024-12-20 DIAGNOSIS — L98.9 FACIAL SKIN LESION: ICD-10-CM

## 2024-12-20 DIAGNOSIS — Z13.1 SCREENING FOR DIABETES MELLITUS: ICD-10-CM

## 2024-12-20 DIAGNOSIS — I69.320 APHASIA AS LATE EFFECT OF CEREBROVASCULAR ACCIDENT (CVA): ICD-10-CM

## 2024-12-20 DIAGNOSIS — F17.201 TOBACCO ABUSE, IN REMISSION: ICD-10-CM

## 2024-12-20 DIAGNOSIS — I63.50 CEREBROVASCULAR ACCIDENT (CVA) DUE TO STENOSIS OF CEREBRAL ARTERY (HCC): ICD-10-CM

## 2024-12-20 DIAGNOSIS — Z00.00 ANNUAL PHYSICAL EXAM: Primary | ICD-10-CM

## 2024-12-20 PROBLEM — T78.40XA ALLERGIC DRUG REACTION: Status: RESOLVED | Noted: 2024-07-18 | Resolved: 2024-12-20

## 2024-12-20 PROCEDURE — 99396 PREV VISIT EST AGE 40-64: CPT | Performed by: PHYSICIAN ASSISTANT

## 2024-12-20 PROCEDURE — 99214 OFFICE O/P EST MOD 30 MIN: CPT | Performed by: PHYSICIAN ASSISTANT

## 2024-12-20 RX ORDER — ASPIRIN 81 MG/1
81 TABLET, CHEWABLE ORAL DAILY
COMMUNITY

## 2024-12-20 NOTE — PATIENT INSTRUCTIONS
"Patient Education     Routine physical for adults   The Basics   Written by the doctors and editors at [facility]   What is a physical? -- A physical is a routine visit, or \"check-up,\" with your doctor. You might also hear it called a \"wellness visit\" or \"preventive visit.\"  During each visit, the doctor will:   Ask about your physical and mental health   Ask about your habits, behaviors, and lifestyle   Do an exam   Give you vaccines if needed   Talk to you about any medicines you take   Give advice about your health   Answer your questions  Getting regular check-ups is an important part of taking care of your health. It can help your doctor find and treat any problems you have. But it's also important for preventing health problems.  A routine physical is different from a \"sick visit.\" A sick visit is when you see a doctor because of a health concern or problem. Since physicals are scheduled ahead of time, you can think about what you want to ask the doctor.  How often should I get a physical? -- It depends on your age and health. In general, for people age 21 years and older:   If you are younger than 50 years, you might be able to get a physical every 3 years.   If you are 50 years or older, your doctor might recommend a physical every year.  If you have an ongoing health condition, like diabetes or high blood pressure, your doctor will probably want to see you more often.  What happens during a physical? -- In general, each visit will include:   Physical exam - The doctor or nurse will check your height, weight, heart rate, and blood pressure. They will also look at your eyes and ears. They will ask about how you are feeling and whether you have any symptoms that bother you.   Medicines - It's a good idea to bring a list of all the medicines you take to each doctor visit. Your doctor will talk to you about your medicines and answer any questions. Tell them if you are having any side effects that bother you. You " "should also tell them if you are having trouble paying for any of your medicines.   Habits and behaviors - This includes:   Your diet   Your exercise habits   Whether you smoke, drink alcohol, or use drugs   Whether you are sexually active   Whether you feel safe at home  Your doctor will talk to you about things you can do to improve your health and lower your risk of health problems. They will also offer help and support. For example, if you want to quit smoking, they can give you advice and might prescribe medicines. If you want to improve your diet or get more physical activity, they can help you with this, too.   Lab tests, if needed - The tests you get will depend on your age and situation. For example, your doctor might want to check your:   Cholesterol   Blood sugar   Iron level   Vaccines - The recommended vaccines will depend on your age, health, and what vaccines you already had. Vaccines are very important because they can prevent certain serious or deadly infections.   Discussion of screening - \"Screening\" means checking for diseases or other health problems before they cause symptoms. Your doctor can recommend screening based on your age, risk, and preferences. This might include tests to check for:   Cancer, such as breast, prostate, cervical, ovarian, colorectal, prostate, lung, or skin cancer   Sexually transmitted infections, such as chlamydia and gonorrhea   Mental health conditions like depression and anxiety  Your doctor will talk to you about the different types of screening tests. They can help you decide which screenings to have. They can also explain what the results might mean.   Answering questions - The physical is a good time to ask the doctor or nurse questions about your health. If needed, they can refer you to other doctors or specialists, too.  Adults older than 65 years often need other care, too. As you get older, your doctor will talk to you about:   How to prevent falling at " home   Hearing or vision tests   Memory testing   How to take your medicines safely   Making sure that you have the help and support you need at home  All topics are updated as new evidence becomes available and our peer review process is complete.  This topic retrieved from [employer] on: May 02, 2024.  Topic 577020 Version 1.0  Release: 32.4.3 - C32.122  © 2024 UpToDate, Inc. and/or its affiliates. All rights reserved.  Consumer Information Use and Disclaimer   Disclaimer: This generalized information is a limited summary of diagnosis, treatment, and/or medication information. It is not meant to be comprehensive and should be used as a tool to help the user understand and/or assess potential diagnostic and treatment options. It does NOT include all information about conditions, treatments, medications, side effects, or risks that may apply to a specific patient. It is not intended to be medical advice or a substitute for the medical advice, diagnosis, or treatment of a health care provider based on the health care provider's examination and assessment of a patient's specific and unique circumstances. Patients must speak with a health care provider for complete information about their health, medical questions, and treatment options, including any risks or benefits regarding use of medications. This information does not endorse any treatments or medications as safe, effective, or approved for treating a specific patient. UpToDate, Inc. and its affiliates disclaim any warranty or liability relating to this information or the use thereof.The use of this information is governed by the Terms of Use, available at https://www.[employer].com/en/know/clinical-effectiveness-terms. 2024© UpToDate, Inc. and its affiliates and/or licensors. All rights reserved.  Copyright   © 2024 UpToDate, Inc. and/or its affiliates. All rights reserved.    Patient Education     Smoking: Not Just Harmful to Your Lungs and Heart   About this  topic   Smoking is very common at any age. It is one of the leading causes of poor health and illness. Smoking can lead to death. It has many harmful chemicals that are released by the tobacco you smoke and inhale. Tobacco has many forms. These are:  Cigarettes  Pipes  Cigars  Chewing tobacco  Electronic cigarettes  Loose  Hookah  All kinds of tobaccos contain many toxic substances. These are what make you sick from smoking.  Nicotine - The main thing that makes you addicted to smoking  Carbon monoxide - A poison that gets into your blood when smoking  Tar - Has chemicals that are cancerous  Lead and many others  These factors affect how much damage smoking will have on you:  How much you smoke  What you smoke  How what you smoke has been prepared  The content of what you smoke  Smoking hurts every part of the body. The lungs and the heart are most often affected by tobacco use.  General   Smoking is very harmful to your body. It can cause many illnesses and can affect how you look.  Smoking and Cancer   Smoking is the most common cause of lung cancer. Smoking may also increase the risk of:  Mouth cancer. This can also be caused by chewing tobacco.  Bladder cancer  Cancer of the tube from the mouth to stomach. This is also called the esophagus.  Cancer of the throat. This can also be caused by chewing tobacco.  Kidney cancer  Liver cancer  Stomach, colon, and rectal cancer  Cancer of the pancreas  Cancer of the cervix in women  Cancer of the blood or leukemia  Skin cancer  Smoking and Your Lungs   If you smoke you are more likely to have:  Breathing problems  Lung infections like pneumonia or bronchitis  Lung disease such as COPD or emphysema  Asthma  Smoking and the Heart and Circulation   Causes heart disease and stroke  Smokers are at a higher risk for high blood pressure  You are more likely to get blood clots  Smoking can lower blood flow to the legs and skin  Smoking and Diabetes   If you smoke, you:  Have a  higher risk of developing diabetes  May have higher blood sugar levels  May have more problems with your diabetes  Smoking and Digestion   Smokers make more stomach acid. This can cause sores or ulcers in your belly or bowel.  Your stomach acids may flow back to your esophagus. This is also called heartburn.  You have more chance of bowel irritation and swelling  Smoking and Your Bones   If you smoke:  Your bone-forming cells don't grow as fast  Your bones may become weaker (osteoporosis)  You may have more low back pain and arthritis  You may have more overuse injuries  You may have a greater risk of breaking bones  Your broken bones may take longer to heal  Smoking and Pregnancy   Makes your baby more prone to problems  You have a higher chance of having a premature baby or baby born early  Your healthy baby may die without reason. This is called sudden infant death syndrome.  Your baby may be born dead (stillbirth)  Your baby may have a low birth weight  You have a higher risk of an ectopic pregnancy or a pregnancy outside of the uterus  You have a higher chance of having a baby with mouth or facial defects  Smoking and Your Eyes, Hair, Hands, and Mouth   If you smoke, you may notice your:  Eyes become cloudy and form cataracts  Hair may get thin and turn gray sooner than it should  Fingernails turn yellow  Teeth become yellowish brown  Gums have more problems  Teeth have problems or even become loose  Sense of taste and smell start to go away  Breath smells bad  Smoking and Skin   Smoking causes:  Less blood flow to the skin  Wrinkles start early around your eyes and mouth  Dry skin  Your skin to lose elasticity and strength  Skin may get splotchy or pale  Your face to look thin and old. This is called smoker's face.  Greater risk of getting a skin problem called psoriasis  Slower healing of cuts or bruises  Smoking and Sex Life   If you smoke you are more likely to have problems like:  Impotence  Decreased  blood flow to the penis. This is also called erectile dysfunction.  Trouble getting pregnant  Early change of life or menopause for women  A higher risk of heart attack or stroke for women who smoke and use birth control pills  Damaged sperm that may lead to problems getting a woman pregnant, birth defects, or miscarriage  Smoking and Your Brain and Behavior   Smoking can:  Affect your mood  Lead to addiction  Cause long-term confusion or damage to your brain cells  Cause low mood  Smoking and Children   If you smoke, your children:  Will be more likely to smoke.  Can be sick from being around your smoke. This is called secondhand smoke.  Need to learn about the bad effects of smoking. Most smokers start before the age of 18. Encourage your children never to smoke.  Smoking and Other Effects   Smoking can cause:  Wounds to take longer to heal  You to eat poorly  Weight loss  Swelling in the body and affect the body's immune or defense system  Rheumatoid arthritis  Greater chance of injury  You to get warts easier (human papillomavirus)  A bad odor in hair and on clothes  You to have poor sports performance  You to spend a lot of money. Smoking is an expensive habit. A smoker who smokes a pack per day in the [place] will spend over $2000 per year on cigarettes.  Health care to cost more  You to miss work more often  Hearing loss  Even if you have smoked for many years, it is not too late to quit. Your body starts to heal as soon as you stop smoking. It is better to quit when you are young, but you can still get healthier if you quit at any age.       Helpful tips   Some helpful steps you can take to help you quit smoking:  Set a date to quit smoking.  Know the reasons that make you smoke more.  Know why you want to quit smoking.  Write down each time you smoke. Include the time and what you are doing. Plan ahead about what you will do instead of smoking when that time or event reappears.  Tell your family and friends  about your plan to quit smoking. Let them know how to help you.  Slowly reduce your smoking.  Remove smoking and tobacco products from your home and other places.  Avoid places and situations where you will more likely smoke. If people close to you smoke, ask them to quit with you. If they do not quit, ask them to not smoke around you.  Reward or treat yourself every time you do not smoke. Do not use food as a reward.  Ask a doctor for help.  Talk with your doctor before you try an electronic cigarette.  Last Reviewed Date   2021-03-31  Consumer Information Use and Disclaimer   This generalized information is a limited summary of diagnosis, treatment, and/or medication information. It is not meant to be comprehensive and should be used as a tool to help the user understand and/or assess potential diagnostic and treatment options. It does NOT include all information about conditions, treatments, medications, side effects, or risks that may apply to a specific patient. It is not intended to be medical advice or a substitute for the medical advice, diagnosis, or treatment of a health care provider based on the health care provider's examination and assessment of a patient’s specific and unique circumstances. Patients must speak with a health care provider for complete information about their health, medical questions, and treatment options, including any risks or benefits regarding use of medications. This information does not endorse any treatments or medications as safe, effective, or approved for treating a specific patient. UpToDate, Inc. and its affiliates disclaim any warranty or liability relating to this information or the use thereof. The use of this information is governed by the Terms of Use, available at https://www.wolters[employer]uwer.com/en/know/clinical-effectiveness-terms   Copyright   Copyright © 2024 UpToDate, Inc. and its affiliates and/or licensors. All rights reserved.

## 2024-12-20 NOTE — PROGRESS NOTES
Adult Annual Physical  Name: Seevriano Ball      : 1970      MRN: [id number]  Encounter Provider: Selina Perez PA-C  Encounter Date: 2024   Encounter department: [facility] [address]    Assessment & Plan  Annual physical exam         Cerebrovascular accident (CVA) due to stenosis of cerebral artery (HCC)    Orders:  •  Ambulatory Referral to Neurosurgery; Future    Aphasia as late effect of cerebrovascular accident (CVA)  Pt still having difficulties, does not want to continue with speech therapy  Orders:  •  Ambulatory Referral to Neurosurgery; Future    Mixed hyperlipidemia    Orders:  •  Lipid Panel with Direct LDL reflex; Future  •  CBC and differential; Future  •  Comprehensive metabolic panel; Future    Facial skin lesion  H/O squamous cell carcinoma  Orders:  •  Ambulatory Referral to Plastic Surgery; Future    Tobacco abuse, in remission  Pt agrees to have lung screening done and realizes it is screening tool  Orders:  •  CT lung screening program; Future    History of colon polyps    Orders:  •  Ambulatory Referral to Gastroenterology; Future    Screening for prostate cancer    Orders:  •  PSA, Total Screen; Future    Screening for diabetes mellitus         Immunizations and preventive care screenings were discussed with patient today. Appropriate education was printed on patient's after visit summary.        Counseling:  Dental Health: discussed importance of regular tooth brushing, flossing, and dental visits.  Injury prevention: discussed safety/seat belts, safety helmets, smoke detectors, carbon monoxide detectors, and smoking near bedding or upholstery.  Exercise: the importance of regular exercise/physical activity was discussed. Recommend exercise 3-5 times per week for at least 30 minutes.          History of Present Illness     Adult Annual Physical:  Patient presents for annual physical.     Diet and Physical Activity:  -  "Diet/Nutrition: well balanced diet.  - Exercise: no formal exercise.    General Health:  - Sleep: sleeps well.  - Hearing: normal hearing bilateral ears.  - Vision: wears glasses, no vision problems and goes for regular eye exams.  - Dental:. edentulous     Health:    - Urinary symptoms: none.     Review of Systems   Reason unable to perform ROS: pt has difficulty speaking and understandin, wife answered.   Constitutional:  Negative for appetite change and fatigue.   HENT:  Negative for trouble swallowing.    Gastrointestinal:  Negative for constipation, diarrhea and nausea.   Neurological:  Positive for speech difficulty. Negative for dizziness, seizures, weakness, light-headedness and headaches.   Psychiatric/Behavioral:  Positive for agitation and confusion. Negative for dysphoric mood and sleep disturbance.      Current Outpatient Medications on File Prior to Visit   Medication Sig Dispense Refill   • aspirin 81 mg chewable tablet Chew 81 mg daily     • atorvastatin (LIPITOR) 40 mg tablet Take 1 tablet (40 mg total) by mouth daily 30 tablet 5     No current facility-administered medications on file prior to visit.      Social History     Tobacco Use   • Smoking status: Every Day     Current packs/day: 1.50     Average packs/day: 1.5 packs/day for 35.0 years (52.5 ttl pk-yrs)     Types: Cigarettes   • Smokeless tobacco: Former   Vaping Use   • Vaping status: Former   • Quit date: 6/9/1995   Substance and Sexual Activity   • Alcohol use: Not Currently   • Drug use: Not Currently     Frequency: 7.0 times per week     Types: Marijuana     Comment: Medical Marijuana   • Sexual activity: Not on file       Objective   /84   Pulse 60   Temp 98 °F (36.7 °C)   Ht 5' 10\" (1.778 m)   Wt 81.6 kg (180 lb)   SpO2 96%   BMI 25.83 kg/m²     Physical Exam  Vitals and nursing note reviewed.   Constitutional:       Appearance: Normal appearance. He is well-developed.   HENT:      Head: Normocephalic.      Right Ear: " Hearing, tympanic membrane, ear canal and external ear normal.      Left Ear: Hearing, tympanic membrane, ear canal and external ear normal.      Nose: Nose normal.      Mouth/Throat:      Mouth: Mucous membranes are moist.      Pharynx: Oropharynx is clear. Uvula midline.   Eyes:      Extraocular Movements: Extraocular movements intact.      Conjunctiva/sclera: Conjunctivae normal.   Neck:      Thyroid: No thyromegaly.      Vascular: No carotid bruit.   Cardiovascular:      Rate and Rhythm: Normal rate and regular rhythm.      Pulses: Normal pulses.      Heart sounds: Normal heart sounds.   Pulmonary:      Effort: Pulmonary effort is normal.      Breath sounds: Normal breath sounds.   Abdominal:      General: Bowel sounds are normal.      Palpations: Abdomen is soft. There is no mass.      Tenderness: There is no abdominal tenderness. There is no right CVA tenderness or left CVA tenderness.   Musculoskeletal:         General: Normal range of motion.      Cervical back: Normal range of motion and neck supple.      Right lower leg: No edema.      Left lower leg: No edema.   Lymphadenopathy:      Cervical: No cervical adenopathy.   Skin:     General: Skin is warm and dry.      Capillary Refill: Capillary refill takes less than 2 seconds.   Neurological:      Mental Status: He is alert and oriented to person, place, and time. Mental status is at baseline.   Psychiatric:         Mood and Affect: Mood normal.         Behavior: Behavior normal.         Thought Content: Thought content normal.         Judgment: Judgment normal.

## 2024-12-20 NOTE — ASSESSMENT & PLAN NOTE
Orders:  •  Lipid Panel with Direct LDL reflex; Future  •  CBC and differential; Future  •  Comprehensive metabolic panel; Future

## 2024-12-20 NOTE — ASSESSMENT & PLAN NOTE
Pt agrees to have lung screening done and realizes it is screening tool  Orders:  •  CT lung screening program; Future

## 2024-12-20 NOTE — ASSESSMENT & PLAN NOTE
Pt still having difficulties, does not want to continue with speech therapy  Orders:  •  Ambulatory Referral to Neurosurgery; Future

## 2024-12-23 ENCOUNTER — TELEPHONE (OUTPATIENT)
Age: 54
End: 2024-12-23

## 2024-12-23 NOTE — TELEPHONE ENCOUNTER
Received warmed transferred from neurosurg pep to be triaged for NP appointment. Upon checking chart patient is established with Dr. Victor . Patient's wife stated he needs to follow up care on stent of his brain ., She states that he is in need of an X-ray of his head to have the stent checked and continue care .  She will like to know if this something Dr. Victor may be able to assist with.

## 2024-12-27 ENCOUNTER — HOSPITAL ENCOUNTER (OUTPATIENT)
Dept: CT IMAGING | Facility: CLINIC | Age: 54
Discharge: HOME | End: 2024-12-27

## 2024-12-27 DIAGNOSIS — F17.201 TOBACCO ABUSE, IN REMISSION: ICD-10-CM

## 2024-12-27 NOTE — TELEPHONE ENCOUNTER
Linda Victor MD  You2 hours ago (3:24 PM)       The patient should have the stent managed by neurosurgery ideally. Is he establishing here, and can they order this?

## 2024-12-30 NOTE — TELEPHONE ENCOUNTER
Phone call to patient's spouse Kathy regarding Dr. Victor's message below. (Communication Consent on file.) Neurosurgery referral was already placed by Selina Perez on 12-20-24. Kathy was aware and they are waiting for a call from NSurg intake department. Nothing further needed.

## 2025-01-02 ENCOUNTER — APPOINTMENT (OUTPATIENT)
Dept: LAB | Facility: HOSPITAL | Age: 55
End: 2025-01-02
Payer: COMMERCIAL

## 2025-01-02 ENCOUNTER — RESULTS FOLLOW-UP (OUTPATIENT)
Dept: FAMILY MEDICINE CLINIC | Facility: CLINIC | Age: 55
End: 2025-01-02

## 2025-01-02 DIAGNOSIS — Z12.5 SCREENING FOR PROSTATE CANCER: ICD-10-CM

## 2025-01-02 DIAGNOSIS — E78.2 MIXED HYPERLIPIDEMIA: ICD-10-CM

## 2025-01-02 LAB
ALBUMIN SERPL BCG-MCNC: 3.9 G/DL (ref 3.5–5)
ALP SERPL-CCNC: 77 U/L (ref 34–104)
ALT SERPL W P-5'-P-CCNC: 17 U/L (ref 7–52)
ANION GAP SERPL CALCULATED.3IONS-SCNC: 3 MMOL/L (ref 4–13)
AST SERPL W P-5'-P-CCNC: 15 U/L (ref 13–39)
BASOPHILS # BLD AUTO: 0.05 THOUSANDS/ΜL (ref 0–0.1)
BASOPHILS NFR BLD AUTO: 1 % (ref 0–1)
BILIRUB SERPL-MCNC: 0.75 MG/DL (ref 0.2–1)
BUN SERPL-MCNC: 14 MG/DL (ref 5–25)
CALCIUM SERPL-MCNC: 9.4 MG/DL (ref 8.4–10.2)
CHLORIDE SERPL-SCNC: 104 MMOL/L (ref 96–108)
CHOLEST SERPL-MCNC: 150 MG/DL (ref ?–200)
CO2 SERPL-SCNC: 31 MMOL/L (ref 21–32)
CREAT SERPL-MCNC: 1.08 MG/DL (ref 0.6–1.3)
EOSINOPHIL # BLD AUTO: 0.12 THOUSAND/ΜL (ref 0–0.61)
EOSINOPHIL NFR BLD AUTO: 2 % (ref 0–6)
ERYTHROCYTE [DISTWIDTH] IN BLOOD BY AUTOMATED COUNT: 13.2 % (ref 11.6–15.1)
GFR SERPL CREATININE-BSD FRML MDRD: 76 ML/MIN/1.73SQ M
GLUCOSE P FAST SERPL-MCNC: 97 MG/DL (ref 65–99)
HCT VFR BLD AUTO: 47.4 % (ref 36.5–49.3)
HDLC SERPL-MCNC: 42 MG/DL
HGB BLD-MCNC: 15.8 G/DL (ref 12–17)
IMM GRANULOCYTES # BLD AUTO: 0.01 THOUSAND/UL (ref 0–0.2)
IMM GRANULOCYTES NFR BLD AUTO: 0 % (ref 0–2)
LDLC SERPL CALC-MCNC: 82 MG/DL (ref 0–100)
LYMPHOCYTES # BLD AUTO: 2.28 THOUSANDS/ΜL (ref 0.6–4.47)
LYMPHOCYTES NFR BLD AUTO: 37 % (ref 14–44)
MCH RBC QN AUTO: 30.7 PG (ref 26.8–34.3)
MCHC RBC AUTO-ENTMCNC: 33.3 G/DL (ref 31.4–37.4)
MCV RBC AUTO: 92 FL (ref 82–98)
MONOCYTES # BLD AUTO: 0.64 THOUSAND/ΜL (ref 0.17–1.22)
MONOCYTES NFR BLD AUTO: 10 % (ref 4–12)
NEUTROPHILS # BLD AUTO: 3.09 THOUSANDS/ΜL (ref 1.85–7.62)
NEUTS SEG NFR BLD AUTO: 50 % (ref 43–75)
NRBC BLD AUTO-RTO: 0 /100 WBCS
PLATELET # BLD AUTO: 190 THOUSANDS/UL (ref 149–390)
PMV BLD AUTO: 8.4 FL (ref 8.9–12.7)
POTASSIUM SERPL-SCNC: 4.7 MMOL/L (ref 3.5–5.3)
PROT SERPL-MCNC: 7.2 G/DL (ref 6.4–8.4)
PSA SERPL-MCNC: 1.31 NG/ML (ref 0–4)
RBC # BLD AUTO: 5.15 MILLION/UL (ref 3.88–5.62)
SODIUM SERPL-SCNC: 138 MMOL/L (ref 135–147)
TRIGL SERPL-MCNC: 131 MG/DL (ref ?–150)
WBC # BLD AUTO: 6.19 THOUSAND/UL (ref 4.31–10.16)

## 2025-01-02 PROCEDURE — 36415 COLL VENOUS BLD VENIPUNCTURE: CPT

## 2025-01-02 PROCEDURE — 80053 COMPREHEN METABOLIC PANEL: CPT

## 2025-01-02 PROCEDURE — 85025 COMPLETE CBC W/AUTO DIFF WBC: CPT

## 2025-01-02 PROCEDURE — 80061 LIPID PANEL: CPT

## 2025-01-02 PROCEDURE — G0103 PSA SCREENING: HCPCS

## 2025-01-07 ENCOUNTER — OFFICE VISIT (OUTPATIENT)
Age: 55
End: 2025-01-07
Payer: COMMERCIAL

## 2025-01-07 VITALS
WEIGHT: 180 LBS | DIASTOLIC BLOOD PRESSURE: 92 MMHG | HEIGHT: 70 IN | SYSTOLIC BLOOD PRESSURE: 120 MMHG | BODY MASS INDEX: 25.77 KG/M2

## 2025-01-07 DIAGNOSIS — Z86.0100 HISTORY OF COLON POLYPS: ICD-10-CM

## 2025-01-07 DIAGNOSIS — I25.10 CORONARY ARTERY CALCIFICATION SEEN ON CAT SCAN: Primary | ICD-10-CM

## 2025-01-07 PROCEDURE — 99203 OFFICE O/P NEW LOW 30 MIN: CPT | Performed by: PHYSICIAN ASSISTANT

## 2025-01-07 RX ORDER — SODIUM CHLORIDE, SODIUM LACTATE, POTASSIUM CHLORIDE, CALCIUM CHLORIDE 600; 310; 30; 20 MG/100ML; MG/100ML; MG/100ML; MG/100ML
125 INJECTION, SOLUTION INTRAVENOUS CONTINUOUS
OUTPATIENT
Start: 2025-01-07

## 2025-01-07 NOTE — PROGRESS NOTES
Name: Severiano Ball      : 1970      MRN: [id number]  Encounter Provider: Simona Landeros PA-C  Encounter Date: 2025   Encounter department: [facility] [place]  :  Assessment & Plan  History of colon polyps    Patient presents to schedule a colonoscopy.  He had a prior colonoscopy in  and 2 polyps were removed; one via piecemeal and repeat colonoscopy was recommended in 1 year.    Will plan for colonoscopy to investigate.    Orders:    Ambulatory Referral to Gastroenterology    Colonoscopy; Future      History of Present Illness   HPI  Severiano Ball is a 54 y.o. male with a PMH of CVA with aphasia, carotid stent, asthma who presents to the office to schedule a colonoscopy.  He had a prior colonoscopy in  and 2 polyps were removed; one via piecemeal and repeat colonoscopy was recommended in 1 year. No family history of colon cancer.  No issues with rectal bleeding, constipation, or abdominal pain.  He had a CVA last  and had a carotid stent.  No issues with dysphagia/swallowing. He reports he has follow up with Cardiology and Neurology this month and next month.  Will plan for the procedure in March after he sees these providers.    I discussed informed consent with the patient for the colonoscopy. The risks/benefits/alternatives of the procedure were discussed with the patient. Risks included, but not limited to, infection, bleeding, perforation were discussed. Patient was agreeable.     History obtained from: patient and patient's wife.    Review of Systems  Pertinent Medical History   There is no content from the last Pertinent Medical History section.  Medical History Reviewed by provider this encounter:  Meds     .  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 Marie    [place] ADJT TIS TRNS/REARGMT F/C/C/M/N/A/G/H/F 10SQCM/< Right 5/11/2023    Procedure: RECONSTRUCTION OF MOHS DEFECT RIGHT CHEEK WITH LOCAL FLAP;  Surgeon: Hola Tan MD;  Location: [facility];  Service: Plastics    SKIN BIOPSY      TONSILLECTOMY      WISDOM TOOTH EXTRACTION      teeth extraction     Family History   Problem Relation Age of Onset    Diabetes Mother     Prostate cancer Father       reports that he has been smoking cigarettes. He has a 52.5 pack-year smoking history. He has quit using smokeless tobacco. He reports that he does not currently use alcohol. He reports that he does not currently use drugs after having used the following drugs: Marijuana. Frequency: 7.00 times per week.  Current Outpatient Medications on File Prior to Visit   Medication Sig Dispense Refill    aspirin 81 mg chewable tablet Chew 81 mg daily      atorvastatin (LIPITOR) 40 mg tablet Take 1 tablet (40 mg total) by mouth daily 30 tablet 5     No current facility-administered medications on file prior to visit.   No Known Allergies   Current Outpatient Medications on File Prior to Visit   Medication Sig Dispense Refill    aspirin 81 mg chewable tablet Chew 81 mg daily      atorvastatin (LIPITOR) 40 mg tablet Take 1 tablet (40 mg total) by mouth daily 30 tablet 5     No current facility-administered medications on file prior to visit.      Social History     Tobacco Use    Smoking status: Every Day     Current packs/day: 1.50     Average packs/day: 1.5 packs/day for 35.0 years (52.5 ttl pk-yrs)     Types: Cigarettes    Smokeless tobacco: Former   Vaping Use  "   Vaping status: Former    Quit date: 6/9/1995   Substance and Sexual Activity    Alcohol use: Not Currently    Drug use: Not Currently     Frequency: 7.0 times per week     Types: Marijuana     Comment: Medical Marijuana    Sexual activity: Not on file        Objective   /92   Ht 5' 10\" (1.778 m)   Wt 81.6 kg (180 lb)   BMI 25.83 kg/m²      Physical Exam      "

## 2025-01-08 ENCOUNTER — TELEPHONE (OUTPATIENT)
Age: 55
End: 2025-01-08

## 2025-01-08 DIAGNOSIS — I69.320 APHASIA AS LATE EFFECT OF CEREBROVASCULAR ACCIDENT (CVA): ICD-10-CM

## 2025-01-08 DIAGNOSIS — I63.50 CEREBROVASCULAR ACCIDENT (CVA) DUE TO STENOSIS OF CEREBRAL ARTERY (HCC): Primary | ICD-10-CM

## 2025-01-08 NOTE — TELEPHONE ENCOUNTER
Patients wife called, wants to know if PCP can put in a referral so her  can get Physical Therapy for speech. Please advise.         Kathy Tompkins (Spouse)  [phone] (Mobile)

## 2025-01-14 NOTE — PATIENT INSTRUCTIONS
Scheduled date of colonoscopy (as of today): 3/6/25  Physician performing colonoscopy: Irving  Location of colonoscopy: [facility]  Bowel prep reviewed with patient: Miralax  Instructions reviewed with patient by: Nereyda MOORE  Clearances:

## 2025-01-15 ENCOUNTER — OFFICE VISIT (OUTPATIENT)
Dept: CARDIOLOGY CLINIC | Facility: CLINIC | Age: 55
End: 2025-01-15
Payer: COMMERCIAL

## 2025-01-15 VITALS
DIASTOLIC BLOOD PRESSURE: 90 MMHG | WEIGHT: 187 LBS | SYSTOLIC BLOOD PRESSURE: 122 MMHG | OXYGEN SATURATION: 97 % | HEIGHT: 70 IN | RESPIRATION RATE: 16 BRPM | HEART RATE: 66 BPM | BODY MASS INDEX: 26.77 KG/M2

## 2025-01-15 DIAGNOSIS — I63.50 CEREBROVASCULAR ACCIDENT (CVA) DUE TO STENOSIS OF CEREBRAL ARTERY (HCC): Primary | ICD-10-CM

## 2025-01-15 DIAGNOSIS — I25.10 CORONARY ARTERY CALCIFICATION SEEN ON CAT SCAN: ICD-10-CM

## 2025-01-15 DIAGNOSIS — E78.2 MIXED HYPERLIPIDEMIA: ICD-10-CM

## 2025-01-15 DIAGNOSIS — R07.9 CHEST PAIN IN ADULT: ICD-10-CM

## 2025-01-15 PROCEDURE — 99204 OFFICE O/P NEW MOD 45 MIN: CPT | Performed by: INTERNAL MEDICINE

## 2025-01-15 RX ORDER — ATORVASTATIN CALCIUM 80 MG/1
80 TABLET, FILM COATED ORAL DAILY
Qty: 30 TABLET | Refills: 5 | Status: SHIPPED | OUTPATIENT
Start: 2025-01-15 | End: 2025-07-14

## 2025-01-15 NOTE — PROGRESS NOTES
Cardiology Consultation     Severiano Ball  [id number]  1970  [address] [facility] [place]  [address]  [place] [address]    Assessment & Plan  Coronary artery calcification seen on CAT scan    Cerebrovascular accident (CVA) due to stenosis of cerebral artery (HCC)    Mixed hyperlipidemia  LDL 82- increase atorvastatin to 80 mg a day.     Chest pain in adult  Given chest pain, coronary calcifications, risk factors will check a nuclear stress test and echo.  Further management based on results.  Continue aspirin and atorvastatin, increased dose as above.  All questions answered.     Severiano Ball is a 54 y.o. male with a PMH of CVA with aphasia, carotid stent, asthma.  He had right-sided weakness and speech impairment in [facility] and had what sounds like a left carotid stent placed.  He was on aspirin, Plavix and a statin but had an allergic reaction and stopped Plavix.  He was supposed to be switched to Brilinta but he stopped all of his meds at that time.    He is now here referred by PCP due to coronary artery calcification seen on a CT of the chest for lung cancer screening. He has aphasia and is a difficult historian. He states he does get chest pains and fatigue, difficult to tease out.     Patient Active Problem List   Diagnosis    GERD (gastroesophageal reflux disease)    Hyperlipidemia    Psoriasis    Annual physical exam    Non-recurrent bilateral inguinal hernia without obstruction or gangrene    Postoperative visit    Cerebrovascular accident (CVA) due to stenosis of cerebral artery (HCC)    Aphasia as late effect of cerebrovascular accident (CVA)    Facial skin lesion    Tobacco abuse, in remission     Past Medical History:   Diagnosis Date    Asthma     Erectile dysfunction     GERD (gastroesophageal reflux disease)     Hyperlipidemia     Kidney stone     Psoriasis     Squamous  cell skin cancer 01/31/2023    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     Social History     Socioeconomic History    Marital status: /Civil Union     Spouse name: Not on file    Number of children: Not on file    Years of education: Not on file    Highest education level: Not on file   Occupational History    Not on file   Tobacco Use    Smoking status: Every Day     Current packs/day: 1.50     Average packs/day: 1.5 packs/day for 35.0 years (52.5 ttl pk-yrs)     Types: Cigarettes    Smokeless tobacco: Former   Vaping Use    Vaping status: Former    Quit date: 6/9/1995   Substance and Sexual Activity    Alcohol use: Not Currently    Drug use: Not Currently     Frequency: 7.0 times per week     Types: Marijuana     Comment: Medical Marijuana    Sexual activity: Not on file   Other Topics Concern    Not on file   Social History Narrative    Not on file     Social Drivers of Health     Financial Resource Strain: Not on file   Food Insecurity: Not on file   Transportation Needs: Not on file   Physical Activity: Not on file   Stress: Not on file   Social Connections: Not on file   Intimate Partner Violence: Not on file   Housing Stability: Not on file      Family History   Problem Relation Age of Onset    Diabetes Mother     Prostate cancer Father      Past Surgical History:   Procedure Laterality Date    COLONOSCOPY      HAND SURGERY Right     HERNIA REPAIR Bilateral 11/17/2021    Procedure: REPAIR HERNIA INGUINAL, LAPAROSCOPIC BILATERAL WITH MESH, TRANS ABDOMINAL;  Surgeon: Melvin Marquez MD;  [facility];  Service: General    MOHS SURGERY  05/10/2023    Right cheek-Dr. Marie    [place] ADJT TIS TRNS/REARGMT F/C/C/M/N/A/G/H/F 10SQCM/< Right 5/11/2023    Procedure: RECONSTRUCTION OF MOHS DEFECT RIGHT CHEEK WITH LOCAL FLAP;  Surgeon: Hola Tan MD;  [facility];  Service: Plastics    SKIN BIOPSY      TONSILLECTOMY      WISDOM TOOTH EXTRACTION      teeth extraction       Current Outpatient  "Medications:     aspirin 81 mg chewable tablet, Chew 81 mg daily, Disp: , Rfl:     atorvastatin (LIPITOR) 40 mg tablet, Take 1 tablet (40 mg total) by mouth daily, Disp: 30 tablet, Rfl: 5  No Known Allergies  Vitals:    01/15/25 1341   BP: 122/90   BP Location: Left arm   Patient Position: Sitting   Cuff Size: Standard   Pulse: 66   Resp: 16   SpO2: 97%   Weight: 84.8 kg (187 lb)   Height: 5' 10\" (1.778 m)       Labs:  Lab Results   Component Value Date    CHOL 211 (H) 02/26/2016    TRIG 131 01/02/2025    TRIG 148 02/26/2016    HDL 42 01/02/2025    HDL 32 (L) 02/26/2016     Imaging: CT lung screening program  Result Date: 1/4/2025  Narrative: CT CHEST LUNG CANCER SCREENING WITHOUT IV CONTRAST INDICATION: F17.201: Nicotine dependence, unspecified, in remission. COMPARISON: None. TECHNIQUE: Unenhanced CT examination of the chest was performed utilizing a low dose protocol. Multiplanar 2D reformatted images were created from the source data. Radiation dose length product (DLP) for this visit:  181 mGy-cm . This examination, like all CT scans performed in the [facility], was performed utilizing techniques to minimize radiation dose exposure, including the use of iterative reconstruction and automated exposure control. FINDINGS: LUNGS: Centrilobular emphysema. 6 mm linear opacity of anterior right upper lobe (series 302 image 60) likely representing scarring. Lingular and right middle lobe atelectasis. Bibasilar groundglass opacities. Mild airway secretions of trachea and left main bronchus. PLEURA: Unremarkable. HEART/GREAT VESSELS: Coronary artery calcifications. No thoracic aortic aneurysm. MEDIASTINUM AND SONIA: Unremarkable. CHEST WALL AND LOWER NECK: Unremarkable. VISUALIZED STRUCTURES IN THE UPPER ABDOMEN: Unremarkable. OSSEOUS STRUCTURES: No acute fracture or destructive osseous lesion.     Impression: Bibasilar groundglass opacities likely representing atelectasis versus hypoventilatory changes.  "  Lung-RADS2, benign appearance or behavior.  Continue annual screening with LDCT in 12 months. Workstation performed: [id number]       Review of Systems:  Review of Systems negative except for HPI.    Physical Exam:  Physical Exam  GEN: Alert and oriented x 3, in no acute distress.  Well appearing and well nourished.   HEENT: Sclera anicteric, conjunctivae pink, mucous membranes moist. Oropharynx clear.   NECK: Supple, no carotid bruits, no significant JVD. Trachea midline, no thyromegaly.   HEART: Regular rhythm, normal S1 and S2, no murmurs, clicks, gallops or rubs. PMI nondisplaced, no thrills.   LUNGS: Clear to auscultation bilaterally; no wheezes, rales, or rhonchi. No increased work of breathing or signs of respiratory distress.   ABDOMEN: Soft, nontender, nondistended, normoactive bowel sounds.   EXTREMITIES: Skin warm and well perfused, no clubbing, cyanosis, or edema.  NEURO: No focal findings. Normal speech. Mood and affect normal.   SKIN: Normal without suspicious lesions on exposed skin.       1. Cerebrovascular accident (CVA) due to stenosis of cerebral artery (HCC)        2. Coronary artery calcification seen on CAT scan  Ambulatory Referral to Cardiology      3. Mixed hyperlipidemia

## 2025-01-27 ENCOUNTER — TELEPHONE (OUTPATIENT)
Dept: NEUROLOGY | Facility: CLINIC | Age: 55
End: 2025-01-27

## 2025-01-27 NOTE — TELEPHONE ENCOUNTER
Received FREEMAN in the [place] mail, forms  have been scanned into the patients chart and routed to [facility] for completion.

## 2025-01-29 ENCOUNTER — HOSPITAL ENCOUNTER (OUTPATIENT)
Dept: NON INVASIVE DIAGNOSTICS | Facility: CLINIC | Age: 55
Discharge: HOME | End: 2025-01-29
Payer: COMMERCIAL

## 2025-01-29 VITALS
HEIGHT: 70 IN | SYSTOLIC BLOOD PRESSURE: 122 MMHG | DIASTOLIC BLOOD PRESSURE: 90 MMHG | WEIGHT: 187 LBS | HEART RATE: 70 BPM | BODY MASS INDEX: 26.77 KG/M2

## 2025-01-29 VITALS
HEIGHT: 70 IN | OXYGEN SATURATION: 97 % | BODY MASS INDEX: 26.77 KG/M2 | SYSTOLIC BLOOD PRESSURE: 164 MMHG | HEART RATE: 56 BPM | DIASTOLIC BLOOD PRESSURE: 108 MMHG | WEIGHT: 187 LBS

## 2025-01-29 DIAGNOSIS — I63.50 CEREBROVASCULAR ACCIDENT (CVA) DUE TO STENOSIS OF CEREBRAL ARTERY (HCC): ICD-10-CM

## 2025-01-29 DIAGNOSIS — I25.10 CORONARY ARTERY CALCIFICATION SEEN ON CAT SCAN: ICD-10-CM

## 2025-01-29 DIAGNOSIS — E78.2 MIXED HYPERLIPIDEMIA: ICD-10-CM

## 2025-01-29 DIAGNOSIS — R07.9 CHEST PAIN IN ADULT: ICD-10-CM

## 2025-01-29 LAB
AORTIC ROOT: 3.3 CM
ASCENDING AORTA: 3.4 CM
CHEST PAIN STATEMENT: NORMAL
E WAVE DECELERATION TIME: 238 MS
E/A RATIO: 0.86
FRACTIONAL SHORTENING: 34 (ref 28–44)
INTERVENTRICULAR SEPTUM IN DIASTOLE (PARASTERNAL SHORT AXIS VIEW): 0.9 CM
INTERVENTRICULAR SEPTUM: 0.9 CM (ref 0.6–1.1)
LAAS-AP2: 13.8 CM2
LAAS-AP4: 15.1 CM2
LEFT ATRIUM SIZE: 3.4 CM
LEFT ATRIUM VOLUME (MOD BIPLANE): 37 ML
LEFT ATRIUM VOLUME INDEX (MOD BIPLANE): 18.2 ML/M2
LEFT INTERNAL DIMENSION IN SYSTOLE: 3.1 CM (ref 2.1–4)
LEFT VENTRICULAR INTERNAL DIMENSION IN DIASTOLE: 4.7 CM (ref 3.5–6)
LEFT VENTRICULAR POSTERIOR WALL IN END DIASTOLE: 0.9 CM
LEFT VENTRICULAR STROKE VOLUME: 67 ML
LV EF US.2D.A4C+ESTIMATED: 56 %
LVSV (TEICH): 67 ML
MAX DIASTOLIC BP: 108 MMHG
MAX PREDICTED HEART RATE: 163 BPM
MV E'TISSUE VEL-LAT: 8 CM/S
MV E'TISSUE VEL-SEP: 6 CM/S
MV PEAK A VEL: 0.43 M/S
MV PEAK E VEL: 37 CM/S
MV STENOSIS PRESSURE HALF TIME: 69 MS
MV VALVE AREA P 1/2 METHOD: 3.19
PROTOCOL NAME: NORMAL
RATE PRESSURE PRODUCT: NORMAL
REASON FOR TERMINATION: NORMAL
RIGHT ATRIUM AREA SYSTOLE A4C: 13.4 CM2
RIGHT VENTRICLE ID DIMENSION: 3.2 CM
SL CV LEFT ATRIUM LENGTH A2C: 4.6 CM
SL CV LV EF: 55
SL CV PED ECHO LEFT VENTRICLE DIASTOLIC VOLUME (MOD BIPLANE) 2D: 105 ML
SL CV PED ECHO LEFT VENTRICLE SYSTOLIC VOLUME (MOD BIPLANE) 2D: 37 ML
SL CV REST NUCLEAR ISOTOPE DOSE: 10.32 MCI
SL CV STRESS NUCLEAR ISOTOPE DOSE: 31.5 MCI
SL CV STRESS RECOVERY BP: NORMAL MMHG
SL CV STRESS RECOVERY HR: 67 BPM
SL CV STRESS RECOVERY O2 SAT: 97 %
SPECT HRT GATED+EF W RNC IV: 53 %
STRESS ANGINA INDEX: 0
STRESS BASELINE BP: NORMAL MMHG
STRESS BASELINE HR: 56 BPM
STRESS O2 SAT REST: 97 %
STRESS PEAK HR: 88 BPM
STRESS POST EXERCISE DUR MIN: 3 MIN
STRESS POST EXERCISE DUR SEC: 0 SEC
STRESS POST O2 SAT PEAK: 96 %
STRESS POST PEAK BP: 146 MMHG
STRESS POST PEAK HR: 88 BPM
STRESS POST PEAK SYSTOLIC BP: 164 MMHG
TARGET HR FORMULA: NORMAL
TR MAX PG: 27 MMHG
TR PEAK VELOCITY: 2.6 M/S
TRICUSPID ANNULAR PLANE SYSTOLIC EXCURSION: 2.6 CM
TRICUSPID VALVE PEAK REGURGITATION VELOCITY: 2.58 M/S

## 2025-01-29 PROCEDURE — A9502 TC99M TETROFOSMIN: HCPCS

## 2025-01-29 PROCEDURE — 93017 CV STRESS TEST TRACING ONLY: CPT

## 2025-01-29 PROCEDURE — 78452 HT MUSCLE IMAGE SPECT MULT: CPT

## 2025-01-29 PROCEDURE — 78452 HT MUSCLE IMAGE SPECT MULT: CPT | Performed by: INTERNAL MEDICINE

## 2025-01-29 PROCEDURE — 93016 CV STRESS TEST SUPVJ ONLY: CPT | Performed by: INTERNAL MEDICINE

## 2025-01-29 PROCEDURE — 93306 TTE W/DOPPLER COMPLETE: CPT

## 2025-01-29 PROCEDURE — 93018 CV STRESS TEST I&R ONLY: CPT | Performed by: INTERNAL MEDICINE

## 2025-01-29 PROCEDURE — 93306 TTE W/DOPPLER COMPLETE: CPT | Performed by: INTERNAL MEDICINE

## 2025-01-29 RX ORDER — REGADENOSON 0.08 MG/ML
0.4 INJECTION, SOLUTION INTRAVENOUS ONCE
Status: COMPLETED | OUTPATIENT
Start: 2025-01-29 | End: 2025-01-29

## 2025-01-29 RX ADMIN — REGADENOSON 0.4 MG: 0.08 INJECTION, SOLUTION INTRAVENOUS at 13:07

## 2025-01-30 ENCOUNTER — TELEPHONE (OUTPATIENT)
Age: 55
End: 2025-01-30

## 2025-01-30 DIAGNOSIS — I63.50 CEREBROVASCULAR ACCIDENT (CVA) DUE TO STENOSIS OF CEREBRAL ARTERY (HCC): Primary | ICD-10-CM

## 2025-01-30 DIAGNOSIS — R45.4 EXCESSIVE ANGER: ICD-10-CM

## 2025-01-30 NOTE — TELEPHONE ENCOUNTER
Patients spouse called in requesting a referral to psych due to patient having increasing mood swings. Patient also had a stress test done yesterday and wife is wondering the results of that? Please advise.

## 2025-01-31 ENCOUNTER — RESULTS FOLLOW-UP (OUTPATIENT)
Dept: CARDIOLOGY CLINIC | Facility: CLINIC | Age: 55
End: 2025-01-31

## 2025-02-05 ENCOUNTER — EVALUATION (OUTPATIENT)
Age: 55
End: 2025-02-05
Payer: COMMERCIAL

## 2025-02-05 DIAGNOSIS — I69.320 APHASIA AS LATE EFFECT OF CEREBROVASCULAR ACCIDENT (CVA): Primary | ICD-10-CM

## 2025-02-05 DIAGNOSIS — I63.50 CEREBROVASCULAR ACCIDENT (CVA) DUE TO STENOSIS OF CEREBRAL ARTERY (HCC): ICD-10-CM

## 2025-02-05 PROCEDURE — 96105 ASSESSMENT OF APHASIA: CPT

## 2025-02-05 NOTE — PROGRESS NOTES
Speech-Language Pathology Initial Evaluation    Today's date: 2025   Patient’s name: Severiano Ball  : 1970  MRN: [id number]  Safety measures: Hx of CVA, hyperlipidemia, asthma  Referring provider: Selina Perez,*    Encounter Diagnosis     ICD-10-CM    1. Aphasia as late effect of cerebrovascular accident (CVA)  I69.320 Ambulatory Referral to Speech Therapy      2. Cerebrovascular accident (CVA) due to stenosis of cerebral artery (HCC)  I63.50 Ambulatory Referral to Speech Therapy          Assessment:  Patient presents with severe aphasia c/b significant deficits in both receptive and expressive language, including the ability to identify and name common/functional items, follow simple and complex, multi-step commands, answer Y/N questions, relay personal information, state automatic sequences (e.g. days of the week), significantly reduced auditory comprehension for longer utterances, and significant limitations contributing to conversations 2/2 language processing and verbal expression deficits. Patient was a previous patient at this facility, and at the time of this evaluation, informal assessment of conversational skills revealed the patient presents with longer spontaneous utterances that are more intelligible than in previous sessions during prior POC, as this evaluating clinician also provided skilled ST tx services for him at that time. Both patient and patient's wife expressed significant challenges at home 2/2 communication deficits which often result in increased emotional lability, anger, outbursts, and arguments between the two. Patient's wife reported observing improvement in patient's ability to speak with friends on the phone though does not notice as much improvement in language skills at home. Patient was administered The [place] Diagnostic Aphasia Evaluation - Third Edition (BDAE-3) during today's evaluation and obtained scores within the 0-10th percentiles for those sub-tests  that were administered. Administration of the full assessment was deferred 2/2 time and PLOF, as patient arrived ten minutes late for evaluation and demonstrated significant difficulty relaying personal information during patient interview. Patient would benefit from outpatient skilled Speech Therapy services to support the highest level of independence with his PLOF, rehabilitate expressive/receptive language skills, promote positive communication interactions with both familiar & unfamiliar listeners, patient and caregiver education/training in compensatory strategies, increase communication of wants/needs, utilize expressive/receptive language within functional activities, participate in meaningful activities, allow for increased socialization, promote safety, reduce caregiver burden, and facilitate overall improved quality of life.         Short-term goals:  Patient will answer basic/simple Y/N questions with 60% accuracy, given max cues, to facilitate increased auditory comprehension skills, to be achieved in 10-12 weeks.  Patient will follow one-step verbal directions (e.g., close your eyes) with 60% accuracy, given max fading to mod cues, to facilitate increased auditory comprehension skills, to be achieved in 10-12 weeks.  Patient will complete naming of automatic tasks (e.g., counting, listing days of the week/months of the year, etc.) with 100% accuracy given max cues to facilitate increased verbal expression skills, to be achieved in 6-8 weeks.  Patient will relay personal information (e.g. name, date of birth, address), w/ or w/o the use of MADISON principles, with 100% accuracy given max cues, to facilitate increased verbal expression skills, to be achieved in 10-12 weeks.     Long-term goals:  Patient will demonstrate improved expressive and receptive language skills during structured and unstructured tasks by discharge.    Patient will improve ability to facilitate communication to meet needs including  "use of compensatory strategies to promote meaningful interactions for improved quality of life and maximize level of independence.       Plan:  Patient would benefit from outpatient skilled Speech Therapy services: Speech-language therapy    Frequency: 3-4x weekly  Duration: 3 months    Intervention certification from: 2/5/2025  Intervention certification to: 5/5/2025      Subjective:    History of present illness: Patient is a 55 y.o. male who was referred to outpatient skilled Speech Therapy services for an aphasia evaluation. PMH remarkable for asthma, ED, GERD, hyperlipidemia, hx of kidney stone, hx of squamous cell skin cancer, psoriasis, and hx of CVA. Patient an unreliable historian 2/2 global speech-language deficits. Wife was initially present for evaluation to assist in patient interview portion. Patient became agitated 2/2 communication breakdowns and requested wife to remain in waiting room for rest of evaluation. Patient unable to relay any personal or historical/medical information IND.     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         Communication Confidence Rating Scale For Aphasia (CCRSA) - wife completed  How confident are you about your ability to talk with people? 50/100  How confident are you about your ability to stay in touch with family and friends? 50/100  How confident are you that people include you in conversations? 70/100  How confident are you about your ability to follow news and sports on TV? 70/100  How confident are you about your ability to follow movies on TV or in a " theatre? 90/100  How confident are you about your ability to speak on the phone? 90/100  How confident are you that people understand you when you talk? 50/100  How confident are you that you can make your own decisions? 50/100  How confident are you about your ability to speak for yourself? 50/100  How confident are you that you can participate in conversation about your finances? 0/100     TOTAL: 57%         Objective:  The [place] Diagnostic Aphasia Examination-Third Edition (BDAE-3) is a comprehensive standardized assessment designed to evaluate a broad range of language impairments that often arise as a consequence of organic brain dysfunction. The BDAE-3 is divided into five subtests, including conversational & expository speech, auditory comprehension, oral expression, reading, and writing. The results of the BDAE-3 are used to classify a patient’s language profiles into one of the localization-based classifications of aphasia: Broca’s, Wernicke’s, anomic, conduction, transcortical, transcortical motor, transcortical sensory, and global aphasia syndromes, although the test does not always provide a diagnosis or a therapeutic approach. The following results were obtained during the administration of the standard form assessment:       Score: Percentile:   SEVERITY RATIN/5 40%ile        FLUENCY:     -Phrase length (rating scale) DNT N/A   -Melodic line (rating scale) DNT N/A   -Grammatical form (rating scale) DNT N/A        CONVERSATION/EXPOSITORY SPEECH:     -Simple social responses  0-10%ile   -Complexity index DNT N/A        AUDITORY COMPREHENSION:     -Basic word discrimination  0%ile   -Commands 15 0-10%ile   -Complex ideational material 012 0%ile        ARTICULATION: DNT    -Nonverbal agility N/A N/A   -Verbal agility N/A N/A   -Articulatory agility (rating scale) N/A N/A        RECITATION:     -Automatized sequences 0 0%ile   -Recitation  DNT N/A   -Susi DNT N/A   -Rhythm DNT N/A         REPETITION: DNT    -Words N/A N/A    -Sentences N/A N/A        NAMING: DNT    -Responsive naming N/A N/A   -[place] Naming Test - long N/A N/A   -Special categories N/A N/A        READING: DNT    -Matching cases & scripts N/A N/A   -Number matching N/A N/A   -Picture-word matching N/A N/A   -Lexical decision N/A N/A   -Homophone matching N/A N/A   -Free grammatical morphemes N/A N/A   -Oral word reading N/A N/A   -Oral sentence reading N/A N/A   -Oral sentence comprehension N/A N/A   -Sentence/paragraph comprehension N/A N/A        WRITING: DNT    -Form N/A N/A   -Letter choice N/A N/A   -Motor facility N/A N/A   -Primer words N/A N/A   -Regular phonics N/A N/A   -Common irregular words N/A N/A   -Written picture naming  N/A N/A   -Narrative writing N/A N/A     Due to time constraints, only portions of the standardized assessment were administered on this date of service.      Overall, patient presents with a severe expressive/receptive aphasia with a severity rating of 1 (out of a possible 5 being fluent speech).    0 - No usable speech or auditory comprehension.     1 - All communication is through fragmentary expression; great need for inference, questioning, and guessing by the listener. The range of information that can be exchanged is limited, and the listener carries the burden of communication.      2 - Conversation about familiar subjects is possible with help from the listener. There are frequent failures to convey the idea, but the patient shares the burden of communication.      3 - The patient can discuss almost all everyday problems with little or no assistance. Reduction of speech and/or comprehension, however, makes conversation about certain material difficult or impossible.      4 - Some obvious loss of fluency in speech or facility of comprehension, without significant limitation on ideas expressed or form of expression.      5 - Minimal discernible speech handicap; the patient may have  subjective difficulties that are not obvious to the listener.             Visit Tracking:  POC expires Unit limit Auth Expiration date PT/OT + Visit Limit?   5/5/25 N/A 1/8/26 PEND                           Visit/Unit Tracking  AUTH Status:  Date 2/5  IE              Yes Used 1               Remaining  9                        Intervention Comments:  - 35 minutes standard aphasia evaluation   - 60 minutes scoring, report write-up, and establishment of POC

## 2025-02-05 NOTE — LETTER
2025    Selina Perez PA-C  [address] [address]  [facility] [address]    Patient: Severiano Ball   YOB: 1970   Date of Visit: 2025     Encounter Diagnosis     ICD-10-CM    1. Aphasia as late effect of cerebrovascular accident (CVA)  I69.320 Ambulatory Referral to Speech Therapy      2. Cerebrovascular accident (CVA) due to stenosis of cerebral artery (HCC)  I63.50 Ambulatory Referral to Speech Therapy          Dear Dr. Perez:    Thank you for your recent referral of Severiano Ball. Please review the attached evaluation summary from Severiano's recent visit.     Please verify that you agree with the plan of care by signing the attached order.     If you have any questions or concerns, please do not hesitate to call.     I sincerely appreciate the opportunity to share in the care of one of your patients and hope to have another opportunity to work with you in the near future.     Sincerely,    Mirella Jacob, SLP      Referring Provider:     Based upon review of the patient's progress and continued therapy plan, it is my medical opinion that Severiano Ball should continue speech therapy treatment at the Physical Therapy at [facility]:                    Selina Perez PA-C  [address] [place] [id number]  Via Fax: [phone]        Speech-Language Pathology Initial Evaluation    Today's date: 2025   Patient’s name: Severiano Ball  : 1970  MRN: [id number]  Safety measures: Hx of CVA, hyperlipidemia, asthma  Referring provider: Selina Perez,*    Encounter Diagnosis     ICD-10-CM    1. Aphasia as late effect of cerebrovascular accident (CVA)  I69.320 Ambulatory Referral to Speech Therapy      2. Cerebrovascular accident (CVA) due to stenosis of cerebral artery (HCC)  I63.50 Ambulatory Referral to Speech Therapy          Assessment:  Patient presents with severe aphasia c/b significant deficits in both receptive and  expressive language, including the ability to identify and name common/functional items, follow simple and complex, multi-step commands, answer Y/N questions, relay personal information, state automatic sequences (e.g. days of the week), significantly reduced auditory comprehension for longer utterances, and significant limitations contributing to conversations 2/2 language processing and verbal expression deficits. Patient was a previous patient at this facility, and at the time of this evaluation, informal assessment of conversational skills revealed the patient presents with longer spontaneous utterances that are more intelligible than in previous sessions during prior POC, as this evaluating clinician also provided skilled ST tx services for him at that time. Both patient and patient's wife expressed significant challenges at home 2/2 communication deficits which often result in increased emotional lability, anger, outbursts, and arguments between the two. Patient's wife reported observing improvement in patient's ability to speak with friends on the phone though does not notice as much improvement in language skills at home. Patient was administered The [place] Diagnostic Aphasia Evaluation - Third Edition (BDAE-3) during today's evaluation and obtained scores within the 0-10th percentiles for those sub-tests that were administered. Administration of the full assessment was deferred 2/2 time and PLOF, as patient arrived ten minutes late for evaluation and demonstrated significant difficulty relaying personal information during patient interview. Patient would benefit from outpatient skilled Speech Therapy services to support the highest level of independence with his PLOF, rehabilitate expressive/receptive language skills, promote positive communication interactions with both familiar & unfamiliar listeners, patient and caregiver education/training in compensatory strategies, increase communication of  wants/needs, utilize expressive/receptive language within functional activities, participate in meaningful activities, allow for increased socialization, promote safety, reduce caregiver burden, and facilitate overall improved quality of life.         Short-term goals:  Patient will answer basic/simple Y/N questions with 60% accuracy, given max cues, to facilitate increased auditory comprehension skills, to be achieved in 10-12 weeks.  Patient will follow one-step verbal directions (e.g., close your eyes) with 60% accuracy, given max fading to mod cues, to facilitate increased auditory comprehension skills, to be achieved in 10-12 weeks.  Patient will complete naming of automatic tasks (e.g., counting, listing days of the week/months of the year, etc.) with 100% accuracy given max cues to facilitate increased verbal expression skills, to be achieved in 6-8 weeks.  Patient will relay personal information (e.g. name, date of birth, address), w/ or w/o the use of MADISON principles, with 100% accuracy given max cues, to facilitate increased verbal expression skills, to be achieved in 10-12 weeks.     Long-term goals:  Patient will demonstrate improved expressive and receptive language skills during structured and unstructured tasks by discharge.    Patient will improve ability to facilitate communication to meet needs including use of compensatory strategies to promote meaningful interactions for improved quality of life and maximize level of independence.       Plan:  Patient would benefit from outpatient skilled Speech Therapy services: Speech-language therapy    Frequency: 3-4x weekly  Duration: 3 months    Intervention certification from: 2/5/2025  Intervention certification to: 5/5/2025      Subjective:    History of present illness: Patient is a 55 y.o. male who was referred to outpatient skilled Speech Therapy services for an aphasia evaluation. PMH remarkable for asthma, ED, GERD, hyperlipidemia, hx of kidney  "stone, hx of squamous cell skin cancer, psoriasis, and hx of CVA. Patient an unreliable historian 2/2 global speech-language deficits. Wife was initially present for evaluation to assist in patient interview portion. Patient became agitated 2/2 communication breakdowns and requested wife to remain in waiting room for rest of evaluation. Patient unable to relay any personal or historical/medical information IND.     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         Communication Confidence Rating Scale For Aphasia (CCRSA) - wife completed  How confident are you about your ability to talk with people? 50/100  How confident are you about your ability to stay in touch with family and friends? 50/100  How confident are you that people include you in conversations? 70/100  How confident are you about your ability to follow news and sports on TV? 70/100  How confident are you about your ability to follow movies on TV or in a theatre? 90/100  How confident are you about your ability to speak on the phone? 90/100  How confident are you that people understand you when you talk? 50/100  How confident are you that you can make your own decisions? 50/100  How confident are you about your ability to speak for yourself? 50/100  How confident are you that you can participate in conversation about your finances? 0/100     TOTAL: 57%         Objective:  The [place] Diagnostic Aphasia Examination-Third Edition (BDAE-3) is a comprehensive standardized assessment designed to evaluate a broad range of language impairments that often " arise as a consequence of organic brain dysfunction. The BDAE-3 is divided into five subtests, including conversational & expository speech, auditory comprehension, oral expression, reading, and writing. The results of the BDAE-3 are used to classify a patient’s language profiles into one of the localization-based classifications of aphasia: Broca’s, Wernicke’s, anomic, conduction, transcortical, transcortical motor, transcortical sensory, and global aphasia syndromes, although the test does not always provide a diagnosis or a therapeutic approach. The following results were obtained during the administration of the standard form assessment:       Score: Percentile:   SEVERITY RATIN/5 40%ile        FLUENCY:     -Phrase length (rating scale) DNT N/A   -Melodic line (rating scale) DNT N/A   -Grammatical form (rating scale) DNT N/A        CONVERSATION/EXPOSITORY SPEECH:     -Simple social responses  0-10%ile   -Complexity index DNT N/A        AUDITORY COMPREHENSION:     -Basic word discrimination  0%ile   -Commands 2/15 0-10%ile   -Complex ideational material 012 0%ile        ARTICULATION: DNT    -Nonverbal agility N/A N/A   -Verbal agility N/A N/A   -Articulatory agility (rating scale) N/A N/A        RECITATION:     -Automatized sequences 0 0%ile   -Recitation  DNT N/A   -Susi DNT N/A   -Rhythm DNT N/A        REPETITION: DNT    -Words N/A N/A    -Sentences N/A N/A        NAMING: DNT    -Responsive naming N/A N/A   -[place] Naming Test - long N/A N/A   -Special categories N/A N/A        READING: DNT    -Matching cases & scripts N/A N/A   -Number matching N/A N/A   -Picture-word matching N/A N/A   -Lexical decision N/A N/A   -Homophone matching N/A N/A   -Free grammatical morphemes N/A N/A   -Oral word reading N/A N/A   -Oral sentence reading N/A N/A   -Oral sentence comprehension N/A N/A   -Sentence/paragraph comprehension N/A N/A        WRITING: DNT    -Form N/A N/A   -Letter choice N/A N/A   -Motor  facility N/A N/A   -Primer words N/A N/A   -Regular phonics N/A N/A   -Common irregular words N/A N/A   -Written picture naming  N/A N/A   -Narrative writing N/A N/A     Due to time constraints, only portions of the standardized assessment were administered on this date of service.      Overall, patient presents with a severe expressive/receptive aphasia with a severity rating of 1 (out of a possible 5 being fluent speech).    0 - No usable speech or auditory comprehension.     1 - All communication is through fragmentary expression; great need for inference, questioning, and guessing by the listener. The range of information that can be exchanged is limited, and the listener carries the burden of communication.      2 - Conversation about familiar subjects is possible with help from the listener. There are frequent failures to convey the idea, but the patient shares the burden of communication.      3 - The patient can discuss almost all everyday problems with little or no assistance. Reduction of speech and/or comprehension, however, makes conversation about certain material difficult or impossible.      4 - Some obvious loss of fluency in speech or facility of comprehension, without significant limitation on ideas expressed or form of expression.      5 - Minimal discernible speech handicap; the patient may have subjective difficulties that are not obvious to the listener.             Visit Tracking:  POC expires Unit limit Auth Expiration date PT/OT + Visit Limit?   5/5/25 N/A 1/8/26 PEND                           Visit/Unit Tracking  AUTH Status:  Date 2/5  IE              Yes Used 1               Remaining  9                        Intervention Comments:  - 35 minutes standard aphasia evaluation   - 60 minutes scoring, report write-up, and establishment of POC

## 2025-02-07 ENCOUNTER — OFFICE VISIT (OUTPATIENT)
Dept: PLASTIC SURGERY | Facility: CLINIC | Age: 55
End: 2025-02-07
Payer: COMMERCIAL

## 2025-02-07 DIAGNOSIS — Z86.007 HISTORY OF SQUAMOUS CELL CARCINOMA IN SITU (SCCIS): ICD-10-CM

## 2025-02-07 DIAGNOSIS — I63.50 CEREBROVASCULAR ACCIDENT (CVA) DUE TO STENOSIS OF CEREBRAL ARTERY (HCC): ICD-10-CM

## 2025-02-07 DIAGNOSIS — L98.9 FACIAL SKIN LESION: Primary | ICD-10-CM

## 2025-02-07 PROCEDURE — 99203 OFFICE O/P NEW LOW 30 MIN: CPT

## 2025-02-10 RX ORDER — ATORVASTATIN CALCIUM 80 MG/1
80 TABLET, FILM COATED ORAL DAILY
Qty: 90 TABLET | Refills: 1 | Status: SHIPPED | OUTPATIENT
Start: 2025-02-10

## 2025-02-12 ENCOUNTER — OFFICE VISIT (OUTPATIENT)
Age: 55
End: 2025-02-12
Payer: COMMERCIAL

## 2025-02-12 DIAGNOSIS — I69.320 APHASIA AS LATE EFFECT OF CEREBROVASCULAR ACCIDENT (CVA): Primary | ICD-10-CM

## 2025-02-12 DIAGNOSIS — I63.50 CEREBROVASCULAR ACCIDENT (CVA) DUE TO STENOSIS OF CEREBRAL ARTERY (HCC): ICD-10-CM

## 2025-02-12 PROCEDURE — 92507 TX SP LANG VOICE COMM INDIV: CPT

## 2025-02-12 NOTE — PROGRESS NOTES
Daily Speech Treatment Note    Today's date: 2025   Patient’s name: Severiano Ball  : 1970  MRN: [id number]  Safety measures: Hx of CVA, hyperlipidemia, asthma   Referring provider: Selina Perez,*    Encounter Diagnosis     ICD-10-CM    1. 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 Cerebrovascular accident (CVA) due to stenosis of cerebral artery (HCC)  I63.50           Visit Tracking:  POC expires Unit limit Auth Expiration date PT/OT + Visit Limit?   25 N/A 26 PEND                                           Visit/Unit Tracking  AUTH Status:  Date   IE                         Yes Used 1  2                         Remaining  9  8                            Subjective/Behavioral:  -Patient pleasant, engaged, and cooperative. Patient attended today's session unaccompanied. No new concerns reported.     Objective/Assessment:  -Reviewed testing results and goals in plan care with patient. Patient is in agreement at this time.  -Patient presented with improved intelligible utterances during spontaneous discourse; numerous neologisms for key content words which disrupts intended message; tangential and off-topic comments noted.     Short-term goals:  Patient will answer basic/simple Y/N questions with 60% accuracy, given max cues, to facilitate increased auditory comprehension skills, to be achieved in 10-12 weeks.  Patient will follow one-step verbal directions (e.g., close your eyes) with 60% accuracy, given max fading to mod cues, to facilitate increased auditory comprehension skills, to be achieved in 10-12 weeks.  Patient will complete naming of automatic tasks (e.g., counting, listing days of the week/months of the year, etc.) with 100% accuracy given max cues to facilitate increased verbal expression skills, to be achieved in 6-8 weeks.  Patient will relay personal information (e.g. name, date of birth, address), w/ or w/o the use of MADISON  principles, with 100% accuracy given max cues, to facilitate increased verbal expression skills, to be achieved in 10-12 weeks.   25: Patient relayed full name with 33% accuracy, IND. Patient relayed  and age with 0% accuracy given max cues including visual, orthographic, and clinician models. 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       Plan:  -Patient was provided with home exercises/activities to target goals in plan of care at the end of today's session.  -Discussed session and patient's progress with caregiver/family member after today's session.  -Continue with current plan of care.

## 2025-02-13 ENCOUNTER — APPOINTMENT (OUTPATIENT)
Age: 55
End: 2025-02-13
Payer: COMMERCIAL

## 2025-02-13 NOTE — PROGRESS NOTES
[facility]  [address], [address], [place] [address]  [phone]    Patient Identification: Severiano Ball is a 55 y.o. male     History of Present Illness: The patient is a 55 y.o.  year-old male  who presents to the office with concerns of a mass on the face. He has a history of a stroke in June 2024 following stenosis of a stent placement of the carotid artery. Pt has aphasia, he is accompanied by his wife who serves as a historian.    Per wife, the patient has a mass on is left cheek that has been present for an unknown amount of time. The mass has not grown in size, shape or presentation. The patient will occasionally states it is bothersome, but denies a history of infection or drainage from the area. Pt has a history of squamous cell carcinoma insitu to the right cheek in 2023.  Patient has no complaints at this time.    Past Medical History:   Diagnosis Date    Asthma     Erectile dysfunction     GERD (gastroesophageal reflux disease)     Hyperlipidemia     Kidney stone     Psoriasis     Squamous cell skin cancer 01/31/2023    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          Review of Systems  Constitutional: Denies fevers, chills or pain.  Skin: Denies any warmth, erythema, edema or mucopurulent drainage. Left cheek mass    Physical Exam  Constitutional:AAOx3, well-developed, no distress. Pt is cooperative and pleasant.  Skin: 0.5 x 0.5cm mass is felt to the left cheek/nasolabial fold. Circular, mobile, soft, no punctum appreciated. Likely cystic or lipoma       Assessment and Plan:  The patient is an 55 y.o.  year-old male who presents to the office for a mass to the left cheek      -At today's visit area of concern examined, consistent with a cystic structure or possibly lipoma. Discussed the benign nature of each. Discussed options of care with the patient and his wife. Both wish to continue with observation to the area as the patient is currently undergoing therapy  and other treatment modalities following his stroke. I agree with plan. They may return PRN with any concerns or wish to discuss surgical intervention.  -Continue follow up with dermatologist for annual skin checks.  -The patient is to call the office with any questions or concerns. All of the patient's questions were answered at this time and they agree with the plan of care.      Anny Garcia PA-C  [facility]

## 2025-02-14 ENCOUNTER — TELEPHONE (OUTPATIENT)
Age: 55
End: 2025-02-14

## 2025-02-14 NOTE — TELEPHONE ENCOUNTER
Contacted patient in regards to ROUTINE Referral. Wife answered phone and patient was not available. Communication form is  to speak with wife. Sending new form via [employer].

## 2025-02-19 ENCOUNTER — OFFICE VISIT (OUTPATIENT)
Age: 55
End: 2025-02-19
Payer: COMMERCIAL

## 2025-02-19 DIAGNOSIS — I63.50 CEREBROVASCULAR ACCIDENT (CVA) DUE TO STENOSIS OF CEREBRAL ARTERY (HCC): ICD-10-CM

## 2025-02-19 DIAGNOSIS — I69.320 APHASIA AS LATE EFFECT OF CEREBROVASCULAR ACCIDENT (CVA): Primary | ICD-10-CM

## 2025-02-19 PROCEDURE — 92507 TX SP LANG VOICE COMM INDIV: CPT

## 2025-02-19 NOTE — PROGRESS NOTES
"Daily Speech Treatment Note    Today's date: 2025   Patient’s name: Severiano Ball  : 1970  MRN: [id number]  Safety measures: Hx of CVA, hyperlipidemia, asthma   Referring provider: Selina Perez,*    Encounter Diagnosis     ICD-10-CM    1. 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 Cerebrovascular accident (CVA) due to stenosis of cerebral artery (HCC)  I63.50             Visit Tracking:  POC expires Unit limit Auth Expiration date PT/OT + Visit Limit?   25 N/A 26 PEND                                           Visit/Unit Tracking  AUTH Status:  Date   IE                       Yes Used 1  2  3                       Remaining  9  8  7                          Subjective/Behavioral:  -Patient pleasant, engaged, and cooperative. Patient attended today's session unaccompanied. Patient's wife commented upon retrieval from waiting room, \"he hates coming here\". Clinician confirmed patient was agreeable to attending continued tx. Patient verbally expressed agreement.     Objective/Assessment: Patient benefited from immediate models/errorless learning with multimodal max supports including models, gestures, visual, and orthographic cues given as simultaneously as possible. Patient initially refused to receive/complete HEP. Patient educated on importance of daily HEP to facilitate improvement in skills with repetition/increased exposure, neuro-plasticity, and path of recovery. Patient expressed verbal comprehension and agreement with recommendations for HEP.     Short-term goals:  Patient will answer basic/simple Y/N questions with 60% accuracy, given max cues, to facilitate increased auditory comprehension skills, to be achieved in 10-12 weeks.  25: Patient answered basic personal Y/N questions (e.g. \"is your name ___? Are you 58 years old?\" Etc.) with 60% accuracy given visual and written cues. Accuracy increased to 100% given a max model. "     Patient will follow one-step verbal directions (e.g., close your eyes) with 60% accuracy, given max fading to mod cues, to facilitate increased auditory comprehension skills, to be achieved in 10-12 weeks.  25: Patient followed simple one-step directions containing body parts with 80% accuracy given max cues including visual, gesture, tactile, written, and clinician models. Given as HEP.     Patient will complete naming of automatic tasks (e.g., counting, listing days of the week/months of the year, etc.) with 100% accuracy given max cues to facilitate increased verbal expression skills, to be achieved in 6-8 weeks.  Patient will relay personal information (e.g. name, date of birth, address), w/ or w/o the use of MADISON principles, with 100% accuracy given max cues, to facilitate increased verbal expression skills, to be achieved in 10-12 weeks.   25: Patient relayed full name with 33% accuracy, IND. Patient relayed  and age with 0% accuracy given max cues including visual, orthographic, and clinician models. 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       Plan:  -Patient was provided with home exercises/activities to target goals in plan of care at the end of today's session.  -Discussed session and patient's progress with caregiver/family member after today's session.  -Continue with current plan of care.

## 2025-02-20 ENCOUNTER — OFFICE VISIT (OUTPATIENT)
Dept: NEUROLOGY | Facility: CLINIC | Age: 55
End: 2025-02-20
Payer: COMMERCIAL

## 2025-02-20 VITALS
SYSTOLIC BLOOD PRESSURE: 130 MMHG | BODY MASS INDEX: 27.46 KG/M2 | HEART RATE: 64 BPM | DIASTOLIC BLOOD PRESSURE: 82 MMHG | OXYGEN SATURATION: 98 % | HEIGHT: 70 IN | WEIGHT: 191.8 LBS

## 2025-02-20 DIAGNOSIS — I65.29 CAROTID STENOSIS: ICD-10-CM

## 2025-02-20 DIAGNOSIS — I63.50 CEREBROVASCULAR ACCIDENT (CVA) DUE TO STENOSIS OF CEREBRAL ARTERY (HCC): Primary | ICD-10-CM

## 2025-02-20 DIAGNOSIS — F32.A DEPRESSION: ICD-10-CM

## 2025-02-20 PROCEDURE — 99213 OFFICE O/P EST LOW 20 MIN: CPT | Performed by: STUDENT IN AN ORGANIZED HEALTH CARE EDUCATION/TRAINING PROGRAM

## 2025-02-20 NOTE — PATIENT INSTRUCTIONS
It was a pleasure to see you today for stroke follow-up.     I have ordered the following tests/referrals:   Carotid Ultrasound  To schedule any tests or appointments, please call the call center at [phone].    Continue taking the following medication:  Aspirin and atorvastatin    Stop taking the following medication:      If you need anything, please contact us at [phone], or contact me via [email or web address].     Sincerely,  Dr. Victor

## 2025-02-20 NOTE — PROGRESS NOTES
Neurology Ambulatory Visit  Name: Severiano Ball       : 1970       MRN: [id number]   Encounter Provider: Linda Victor MD   Encounter Date: 2025  Encounter department: [facility]    Severiano Ball is a 55 y.o. male smoker with PMH of asthma and HLD who presents for follow-up of L MCA stroke (2024) 2/2 L ICA/M1 tandem occlusion s/p MT and L ICA stent. He has residual receptive aphasia.    He was started on DAPT and statin, but had an allergic reaction in July to Plavix. He subsequently stopped all of his medication. We restarted his aspirin and statin at our last visit. I will order a carotid U/S to evaluate his left ICA stent since he was off medication.  Assessment & Plan  Cerebrovascular accident (CVA) due to stenosis of cerebral artery (HCC)  - continue ASA 81 and atorvastatain 80  - continue SLP   - smoking cessation  - Goal SBP <130/90  - Goal A1C <7  - Goal LDL <70 (last LDL 82), we discussed diet today and cutting down on fast food  Orders:    Hemoglobin A1C; Future    Carotid stenosis  S/p stent  - f/u with neurosurgery  - will order Carotid U/S as patient was off antiplatelets for a long period of time  Orders:    VAS carotid complete study; Future    Depression  - I explained that depression is very common after stroke, and we could start an antidepressant  - Wife will think about it; she believes it may get better after winter is over        RTC 6 mo         INTERVAL HISTORY  He is severely aphasic to history is provided by his wife.     They have not followed up with his surgeon in [place] because it is too far away. They are hoping that I will be able to evaluate his stent. He is currently taking aspirin and atorvastatin as I had advised. He was dsicharged from PT and OT but continues SLP to work on his speech. There has been some mild improvements but he remains severely aphasic.     His wife tells me that he is often frustrated and depressed  "and sometimes expresses that he no longer wants to be alive. He has no plans to harm himself. She is wondering if his mood is poor related to the cold weather and it might improve in the summer, since he is generally active and now has to stay inside.    In the meantime, we did receive his records from [facility].     Per chart review:  \"54-year-old male who is a smoke who presented with acute onset global aphasia, right facial droop, and dense right-sided weakness. On arrival to ED his NIH stroke scale was 24. He did not receive TNK as he presented outside the therapeutic window. CTH showed left dense MCA sign and early left MCA infarct. CTA head and neck showed left ICA and left M1 occlusion. Underwent thrombectomy and left ICA stenting by neurosurgery with TICI 2B reperfusion. Started on aspirin and Plavix.\" Plan was for DAPT for 3 months per NSGY.\"      Prior Work-up:   TTE 6/13/24: EF >55%, normal LV, normal atrial size  MRI brain wo contrast 6/13/24:  Large area of restricted diffusion with associated gyral swelling and edema is seen within the left frontotemporal lobes and basal ganglia. Mild left-to-right midline shift of approximately 1-2mm.   CTA Head 6/12/24: Occlusion of the left internal carotid artery extends intracranially. The most distal aspect of the vessel has some retrograde opacification via collateralization. The left M1 segment of the MCA is also occluded beyond its most proximal portion.   CT Perfusion 6/12/24: There is a large region of delayed perfusion on the time maps in the left MCA territory the majority of this region has corresponding decreased CBV suspicious for large core infarct.   CTA Neck 6/12/24: The left internal carotid artery is occluded at its origin.   CT Head 6/12/24: Acute large left MCA infarct with thrombus in the left middle cerebral artery. No acute intracranial hemorrhage.   EKG: Sinus rhythm    Past Medical History:    Past Medical History:   Diagnosis Date    Asthma  " "   Erectile dysfunction     GERD (gastroesophageal reflux disease)     Hyperlipidemia     Kidney stone     Psoriasis     Squamous cell skin cancer 01/31/2023    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     Past Surgical History:   Procedure Laterality Date    COLONOSCOPY      HAND SURGERY Right     HERNIA REPAIR Bilateral 11/17/2021    Procedure: REPAIR HERNIA INGUINAL, LAPAROSCOPIC BILATERAL WITH MESH, TRANS ABDOMINAL;  Surgeon: Melvin Marquez MD;  Location: [facility];  Service: General    IR CAROTID STENT  06/2024    MOHS SURGERY  05/10/2023    Right cheek-Dr. Marie    MS ADJT TIS TRNS/REARGMT F/C/C/M/N/A/G/H/F 10SQCM/< Right 05/11/2023    Procedure: RECONSTRUCTION OF MOHS DEFECT RIGHT CHEEK WITH LOCAL FLAP;  Surgeon: Hola Tan MD;  Location: [facility];  Service: Plastics    SKIN BIOPSY      TONSILLECTOMY      WISDOM TOOTH EXTRACTION      teeth extraction       Family History:  Family History   Problem Relation Age of Onset    Diabetes Mother     Prostate cancer Father        Social History:  Social History     Tobacco Use    Smoking status: Every Day     Current packs/day: 1.50     Average packs/day: 1.5 packs/day for 35.0 years (52.5 ttl pk-yrs)     Types: Cigarettes    Smokeless tobacco: Former   Vaping Use    Vaping status: Former    Quit date: 6/9/1995   Substance Use Topics    Alcohol use: Not Currently    Drug use: Not Currently     Frequency: 7.0 times per week     Types: Marijuana     Comment: Medical Marijuana        Allergies:  No Known Allergies    Medications:    Current Outpatient Medications:     aspirin 81 mg chewable tablet, Chew 81 mg daily, Disp: , Rfl:     atorvastatin (LIPITOR) 80 mg tablet, TAKE 1 TABLET BY MOUTH EVERY DAY, Disp: 90 tablet, Rfl: 1      OBJECTIVE  /82 (BP Location: Left arm, Patient Position: Sitting, Cuff Size: Large)   Pulse 64   Ht 5' 10\" (1.778 m)   Wt 87 kg (191 lb 12.8 oz)   SpO2 98%   BMI 27.52 kg/m²      Labs  I have reviewed pertinent labs:  CBC:   Lab " Results   Component Value Date    WBC 6.19 01/02/2025    RBC 5.15 01/02/2025    HGB 15.8 01/02/2025    HCT 47.4 01/02/2025    MCV 92 01/02/2025     01/02/2025    MCH 30.7 01/02/2025    MCHC 33.3 01/02/2025    RDW 13.2 01/02/2025    MPV 8.4 (L) 01/02/2025    NEUTROABS 3.09 01/02/2025     CMP:   Lab Results   Component Value Date    SODIUM 138 01/02/2025    K 4.7 01/02/2025     01/02/2025    CO2 31 01/02/2025    AGAP 3 (L) 01/02/2025    BUN 14 01/02/2025    CREATININE 1.08 01/02/2025    GLUC 113 07/04/2024    GLUF 97 01/02/2025    CALCIUM 9.4 01/02/2025    AST 15 01/02/2025    ALT 17 01/02/2025    ALKPHOS 77 01/02/2025    TP 7.2 01/02/2025    ALB 3.9 01/02/2025    TBILI 0.75 01/02/2025    EGFR 76 01/02/2025     Lipid Profile:   Lab Results   Component Value Date    CHOLESTEROL 150 01/02/2025    HDL 42 01/02/2025    TRIG 131 01/02/2025    LDLCALC 82 01/02/2025    NONHDLC 179 (H) 02/26/2016           General Exam  GENERAL APPEARANCE:  No distress, alert, interactive and cooperative.  CARDIOVASCULAR: Warm and well perfused  LUNGS: normal work of breathing on room air  EXTREMITIES: no peripheral edema     Neurologic Exam  Mental Status: He follows commands inconsistently, mostly by mimicking. His speech is insensible.   Language: Fluent, comprehension intact.  Cranial Nerves: EOMI with no nystagmus. Face is symmetric. No dysarthria. Hearing is intact to conversation.    Motor:  Antigravity in all extremities.   Gait: Normal gait.     Administrative Statements   The total amount of time spent with the patient and on chart review and documentation was 20 minutes. Issues addressed during this visit included counseling on medical management and counseling on lifestyle.

## 2025-02-20 NOTE — ASSESSMENT & PLAN NOTE
- continue ASA 81 and atorvastatain 80  - continue SLP   - smoking cessation  - Goal SBP <130/90  - Goal A1C <7  - Goal LDL <70 (last LDL 82), we discussed diet today and cutting down on fast food  Orders:    Hemoglobin A1C; Future

## 2025-02-25 ENCOUNTER — OFFICE VISIT (OUTPATIENT)
Age: 55
End: 2025-02-25
Payer: COMMERCIAL

## 2025-02-25 DIAGNOSIS — I63.50 CEREBROVASCULAR ACCIDENT (CVA) DUE TO STENOSIS OF CEREBRAL ARTERY (HCC): ICD-10-CM

## 2025-02-25 DIAGNOSIS — I69.320 APHASIA AS LATE EFFECT OF CEREBROVASCULAR ACCIDENT (CVA): Primary | ICD-10-CM

## 2025-02-25 PROCEDURE — 92507 TX SP LANG VOICE COMM INDIV: CPT

## 2025-02-25 NOTE — PROGRESS NOTES
"Daily Speech Treatment Note    Today's date: 2025   Patient’s name: Severiano Ball  : 1970  MRN: [id number]  Safety measures: Hx of CVA, hyperlipidemia, asthma   Referring provider: Selina Perez,*    Encounter Diagnosis     ICD-10-CM    1. 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 Cerebrovascular accident (CVA) due to stenosis of cerebral artery (HCC)  I63.50           \    Visit Tracking:  POC expires Unit limit Auth Expiration date PT/OT + Visit Limit?   25 N/A 26 18                                           Visit/Unit Tracking  AUTH Status:  Date   IE                     Yes Used 1  2  3  4                     Remaining  17  16  15  14                        Subjective/Behavioral:  Spouse reports patient has to modify schedule to earlier appointments d/t patient request.     Objective/Assessment:. Noted frequent neologistic and semantic paraphasias throughout session.    Short-term goals:  Patient will answer basic/simple Y/N questions with 60% accuracy, given max cues, to facilitate increased auditory comprehension skills, to be achieved in 10-12 weeks.  25: Patient answered basic personal Y/N questions (e.g. \"is your name ___? Are you 58 years old?\" Etc.) with 60% accuracy given visual and written cues. Accuracy increased to 100% given a max model.     Patient will follow one-step verbal directions (e.g., close your eyes) with 60% accuracy, given max fading to mod cues, to facilitate increased auditory comprehension skills, to be achieved in 10-12 weeks.  25: Patient followed simple one-step directions containing body parts with 80% accuracy given max cues including visual, gesture, tactile, written, and clinician models. Given as HEP.     Patient will complete naming of automatic tasks (e.g., counting, listing days of the week/months of the year, etc.) with 100% accuracy given max cues to facilitate increased verbal " expression skills, to be achieved in 6-8 weeks.    : Verbalizing alphabet with visual aid: Trial 1: 84% accuracy ind, trial 2: 46% accuracy ind, trial 3: 69% accuracy, trial 4: 61% accuracy, trial 5: 76% accuracy.  Patient benefited from errorless learning, verbal/visual models, gestures, visual, and orthographic cues.Noted repetition of task tends to decrease accuracy. Counting 1-10 only 1 trial with visual aid: 100% accuracy independently!    Patient will relay personal information (e.g. name, date of birth, address), w/ or w/o the use of MADISON principles, with 100% accuracy given max cues, to facilitate increased verbal expression skills, to be achieved in 10-12 weeks.   25: Patient relayed full name with 33% accuracy, IND. Patient relayed  and age with 0% accuracy given max cues including visual, orthographic, and clinician models. 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   : Attempted to relay information from phone of different contacts and the relation to patient: Task completed in  opp (54% acc) independently, increasing to  opp (100% acc) from max cueing from spouse. Patient benefited from rrorless learning, verbal/visual models, that lead to increased accuracy        Plan:  -Patient was provided with home exercises/activities to target goals in plan of care at the end of today's session.  -Discussed session and patient's progress with caregiver/family member after today's session.  -Continue with current plan of care.

## 2025-02-26 ENCOUNTER — APPOINTMENT (OUTPATIENT)
Age: 55
End: 2025-02-26
Payer: COMMERCIAL

## 2025-02-28 ENCOUNTER — OFFICE VISIT (OUTPATIENT)
Age: 55
End: 2025-02-28
Payer: COMMERCIAL

## 2025-02-28 DIAGNOSIS — I63.50 CEREBROVASCULAR ACCIDENT (CVA) DUE TO STENOSIS OF CEREBRAL ARTERY (HCC): ICD-10-CM

## 2025-02-28 DIAGNOSIS — I69.320 APHASIA AS LATE EFFECT OF CEREBROVASCULAR ACCIDENT (CVA): Primary | ICD-10-CM

## 2025-02-28 PROCEDURE — 92507 TX SP LANG VOICE COMM INDIV: CPT

## 2025-02-28 NOTE — PROGRESS NOTES
"Daily Speech Treatment Note    Today's date: 2025   Patient’s name: Severiano Ball  : 1970  MRN: [id number]  Safety measures: Hx of CVA, hyperlipidemia, asthma   Referring provider: Selina Perez,*    Encounter Diagnosis     ICD-10-CM    1. 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 Cerebrovascular accident (CVA) due to stenosis of cerebral artery (HCC)  I63.50           Visit Tracking:  POC expires Unit limit Auth Expiration date PT/OT + Visit Limit?   25 N/A 2025 18                                           Visit/Unit Tracking  AUTH Status:  Date   IE                   Yes Used 1  2  3  4  5                   Remaining  17  16  15  14  13                      Subjective/Behavioral:  Patient reported no new concerns for today's session.      Objective/Assessment:. Noted frequent neologistic, semantic, phonemic paraphasias throughout session. During task for goal 2, patient frequently added \"er\" and \"a\" at the end of each word (ex. Cup-a, plate-er).     Short-term goals:  Patient will answer basic/simple Y/N questions with 60% accuracy, given max cues, to facilitate increased auditory comprehension skills, to be achieved in 10-12 weeks.  25: Patient answered basic personal Y/N questions (e.g. \"is your name ___? Are you 58 years old?\" Etc.) with 60% accuracy given visual and written cues. Accuracy increased to 100% given a max model.     Patient will follow one-step verbal directions (e.g., close your eyes) with 60% accuracy, given max fading to mod cues, to facilitate increased auditory comprehension skills, to be achieved in 10-12 weeks.  25: Patient followed simple one-step directions containing body parts with 80% accuracy given max cues including visual, gesture, tactile, written, and clinician models. Given as HEP.   25: Patient was given an array of 3 initially and increasing to 6 tangible objects and had to " match the object with the orthographic cues: Task completed with 60% accuracy from max verbal models, tactile, gestures cues, and multiple repetitions of task. Patient infrequently named the object with no consistent pattern.      Patient will complete naming of automatic tasks (e.g., counting, listing days of the week/months of the year, etc.) with 100% accuracy given max cues to facilitate increased verbal expression skills, to be achieved in 6-8 weeks.    : Verbalizing alphabet with visual aid: Trial 1: 84% accuracy ind, trial 2: 46% accuracy ind, trial 3: 69% accuracy, trial 4: 61% accuracy ind, trial 5: 76% accuracy ind.  Patient benefited from errorless learning, verbal/visual models, gestures, visual, and orthographic cues.Noted repetition of task tends to decrease accuracy. Counting 1-10 only 1 trial with visual aid: 100% accuracy independently!  : Verbalizing alphabet with visual aid and gestures Trial 1: 65% accuracy ind, trial 2: 80% accuracy ind, trial 3: 76% accuracy ind, trial 4: 92% accuracy ind. Increased to 100% accuracy from max visual/verbal models for trial 2 & 4. Patient benefited from errorless learning. Counting 1-10 only 1 trial with visual aid: 100% accuracy independently!    Patient will relay personal information (e.g. name, date of birth, address), w/ or w/o the use of MADISON principles, with 100% accuracy given max cues, to facilitate increased verbal expression skills, to be achieved in 10-12 weeks.   25: Patient relayed full name with 33% accuracy, IND. Patient relayed  and age with 0% accuracy given max cues including visual, orthographic, and clinician models. 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   : Attempted to relay information from phone of different contacts and the relation to patient: Task completed in  opp (54% acc) independently, increasing to  opp (100% acc) from max cueing  from spouse. Patient benefited from rrorless learning, verbal/visual models, that lead to increased accuracy        Plan:  -Patient was provided with home exercises/activities to target goals in plan of care at the end of today's session.  -Discussed session and patient's progress with caregiver/family member after today's session.  -Continue with current plan of care.

## 2025-03-03 ENCOUNTER — OFFICE VISIT (OUTPATIENT)
Age: 55
End: 2025-03-03
Payer: COMMERCIAL

## 2025-03-03 DIAGNOSIS — I63.50 CEREBROVASCULAR ACCIDENT (CVA) DUE TO STENOSIS OF CEREBRAL ARTERY (HCC): ICD-10-CM

## 2025-03-03 DIAGNOSIS — I69.320 APHASIA AS LATE EFFECT OF CEREBROVASCULAR ACCIDENT (CVA): Primary | ICD-10-CM

## 2025-03-03 PROCEDURE — 92507 TX SP LANG VOICE COMM INDIV: CPT

## 2025-03-03 NOTE — PROGRESS NOTES
"Daily Speech Treatment Note    Today's date: 3/3/2025   Patient’s name: Severiano Ball  : 1970  MRN: [id number]  Safety measures: Hx of CVA, hyperlipidemia, asthma   Referring provider: Selina Perez,*    Encounter Diagnosis     ICD-10-CM    1. 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 Cerebrovascular accident (CVA) due to stenosis of cerebral artery (HCC)  I63.50             Visit Tracking:  POC expires Unit limit Auth Expiration date PT/OT + Visit Limit?   25 N/A 2025 18                                           Visit/Unit Tracking  AUTH Status:  Date   IE  2/12  2/19  2/25  2/28  3/3    PU             Yes Used 1  2  3  4  5  6                 Remaining  17  16  15  14  13  12                    Subjective/Behavioral: Patient pleasant, engaged, and cooperative. Patient attended today's session unaccompanied. No new concerns reported.     Objective/Assessment: Patient's daughter video called during session and asked for cueing techniques; with patient's agreement, daughter was educated on aphasia, patient's receptive/expressive PLOF, and cueing techniques utilized in conversation to facilitate improved communicative success/effectiveness. Daughter informed any family member can join pt in tx. Daughter given automatic sequences and personal information to complete as HEP when available/interested. Daughter and patient verbalized agreement.     Short-term goals:  Patient will answer basic/simple Y/N questions with 60% accuracy, given max cues, to facilitate increased auditory comprehension skills, to be achieved in 10-12 weeks.  25: Patient answered basic personal Y/N questions (e.g. \"is your name ___? Are you 58 years old?\" Etc.) with 60% accuracy given visual and written cues. Accuracy increased to 100% given a max model.   3/3/25: Patient answered personal Y/N questions with 50% given max cues, including orthographic cues and clinician models. "     Patient will follow one-step verbal directions (e.g., close your eyes) with 60% accuracy, given max fading to mod cues, to facilitate increased auditory comprehension skills, to be achieved in 10-12 weeks.  2/19/25: Patient followed simple one-step directions containing body parts with 80% accuracy given max cues including visual, gesture, tactile, written, and clinician models. Given as HEP.   2/28/25: Patient was given an array of 3 initially and increasing to 6 tangible objects and had to match the object with the orthographic cues: Task completed with 60% accuracy from max verbal models, tactile, gestures cues, and multiple repetitions of task. Patient infrequently named the object with no consistent pattern.      Patient will complete naming of automatic tasks (e.g., counting, listing days of the week/months of the year, etc.) with 100% accuracy given max cues to facilitate increased verbal expression skills, to be achieved in 6-8 weeks.  2/25: Verbalizing alphabet with visual aid: Trial 1: 84% accuracy ind, trial 2: 46% accuracy ind, trial 3: 69% accuracy, trial 4: 61% accuracy ind, trial 5: 76% accuracy ind.  Patient benefited from errorless learning, verbal/visual models, gestures, visual, and orthographic cues.Noted repetition of task tends to decrease accuracy. Counting 1-10 only 1 trial with visual aid: 100% accuracy independently!  2/28: Verbalizing alphabet with visual aid and gestures Trial 1: 65% accuracy ind, trial 2: 80% accuracy ind, trial 3: 76% accuracy ind, trial 4: 92% accuracy ind. Increased to 100% accuracy from max visual/verbal models for trial 2 & 4. Patient benefited from errorless learning. Counting 1-10 only 1 trial with visual aid: 100% accuracy independently!    Patient will relay personal information (e.g. name, date of birth, address), w/ or w/o the use of MADISON principles, with 100% accuracy given max cues, to facilitate increased verbal expression skills, to be achieved in 10-12  weeks.   25: Patient relayed full name with 33% accuracy, IND. Patient relayed  and age with 0% accuracy given max cues including visual, orthographic, and clinician models. 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   : Attempted to relay information from phone of different contacts and the relation to patient: Task completed in  opp (54% acc) independently, increasing to  opp (100% acc) from max cueing from spouse. Patient benefited from rrorless learning, verbal/visual models, that lead to increased accuracy  3/3/25: Patient relayed personal information, including full name, and  with 33% accuracy given max cues including errorless learning, orthographic, visuals for pacing, and attempts to incorporate MADISON principles.         Plan:  -Patient was provided with home exercises/activities to target goals in plan of care at the end of today's session.  -Discussed session and patient's progress with caregiver/family member after today's session.  -Continue with current plan of care.

## 2025-03-04 ENCOUNTER — PREP FOR PROCEDURE (OUTPATIENT)
Age: 55
End: 2025-03-04

## 2025-03-05 ENCOUNTER — EVALUATION (OUTPATIENT)
Age: 55
End: 2025-03-05
Payer: COMMERCIAL

## 2025-03-05 DIAGNOSIS — I69.320 APHASIA AS LATE EFFECT OF CEREBROVASCULAR ACCIDENT (CVA): Primary | ICD-10-CM

## 2025-03-05 DIAGNOSIS — I63.50 CEREBROVASCULAR ACCIDENT (CVA) DUE TO STENOSIS OF CEREBRAL ARTERY (HCC): ICD-10-CM

## 2025-03-05 PROCEDURE — 92507 TX SP LANG VOICE COMM INDIV: CPT

## 2025-03-05 NOTE — PROGRESS NOTES
Speech-Language Pathology Progress Update    Today's date: 3/5/2025   Patient’s name: Severiano Ball  : 1970  MRN: [id number]  Safety measures: Hx of CVA, hyperlipidemia, asthma   Referring provider: Selina Perez,*    Encounter Diagnosis     ICD-10-CM    1. 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 Cerebrovascular accident (CVA) due to stenosis of cerebral artery (HCC)  I63.50           Assessment:   Patient continues to present with mixed severe aphasia c/b significant deficits in both receptive and expressive language, including the ability to identify and name common/functional items, follow simple and complex, multi-step commands, answer Y/N questions, relay personal information, state automatic sequences (e.g. days of the week), significantly reduced auditory comprehension for longer utterances, and significant limitations contributing to conversations 2/2 language processing and verbal expression deficits.   Patient demonstrates slow progress on goals addressed 2/2 severity and aphasia type (patient most closely resembles Wernicke's type aphasia), with reduced ability to comprehend oral and written language and self-monitor own productions/responses. Patient also exhibits significant difficulties following cueing 2/2 receptive language deficits. Patient benefits from consistent multimodal cues of all available cueing types (I.e. gesture, orthographic, visual, verbal, tactile-kinesthetic, and models). Patient would benefit from continued outpatient skilled Speech Therapy services to support the highest level of independence with his PLOF, rehabilitate expressive/receptive language skills, promote positive communication interactions with both familiar & unfamiliar listeners, patient and caregiver education/training in compensatory strategies, increase communication of wants/needs, utilize expressive/receptive language within functional activities, participate  in meaningful activities, allow for increased socialization, promote safety, reduce caregiver burden, and facilitate overall improved quality of life.         Short-term goals:  Patient will answer basic/simple Y/N questions with 60% accuracy, given max cues, to facilitate increased auditory comprehension skills, to be achieved in 10-12 weeks. ONGOING  Patient will follow one-step verbal directions (e.g., close your eyes) with 60% accuracy, given max fading to mod cues, to facilitate increased auditory comprehension skills, to be achieved in 10-12 weeks. ONGOING  Patient will complete naming of automatic tasks (e.g., counting, listing days of the week/months of the year, etc.) with 100% accuracy given max cues to facilitate increased verbal expression skills, to be achieved in 6-8 weeks. ONGOING  Patient will relay personal information (e.g. name, date of birth, address), w/ or w/o the use of MADISON principles, with 100% accuracy given max cues, to facilitate increased verbal expression skills, to be achieved in 10-12 weeks. ONGOING     Long-term goals:  Patient will demonstrate improved expressive and receptive language skills during structured and unstructured tasks by discharge. ONGOING  Patient will improve ability to facilitate communication to meet needs including use of compensatory strategies to promote meaningful interactions for improved quality of life and maximize level of independence. ONGOING         Plan:  Patient would benefit from outpatient skilled Speech Therapy services: Speech-language therapy    Frequency: 3-4x weekly  Duration: 2 months    Intervention certification from: 3/5/2025  Intervention certification to: 5/5/2025      Subjective:  - Patient pleasant, engaged, and cooperative. Patient's wife present for today's session. No new concerns reported.     Patient's goal(s): Per patient interview questions, clinician surmised patient wants to understand and speak better. As per patient's wife,  "\"that we can understand him a little better.\"     Pain: Absent (scale:  N/A )      Objective (testing):  No formal testing administered as today's session serves as a progress update.         Treatment:  Patient will complete naming of automatic tasks (e.g., counting, listing days of the week/months of the year, etc.) with 100% accuracy given max cues to facilitate increased verbal expression skills, to be achieved in 6-8 weeks.  3/5/25: Patient stated the days of the week with 0% accuracy given max cues. Patient stated the months of the year in 1 out of 2 trials given max cues including visual, orthographic, placement cues, verbal, and immediate clinician models, given in most-least-hierarchy. Given as HEP.   Patient will answer basic/simple Y/N questions with 60% accuracy, given max cues, to facilitate increased auditory comprehension skills, to be achieved in 10-12 weeks.   3/5/25: Patient answered basic/personal Y/N questions with 55% accuracy given max cues including visual, orthographic, placement cues, verbal, and clinician models. Within those trials, he correctly answered Y/N questions with 18% accuracy, IND. Given as HEP.        Visit Tracking:  POC expires Unit limit Auth Expiration date PT/OT + Visit Limit?   5/5/25 N/A 6/5/2025 18                                           Visit/Unit Tracking  AUTH Status:  Date 2/5  IE  2/12  2/19  2/25  2/28  3/3  3/5  PU             Yes Used 1  2  3  4  5  6  7               Remaining  17  16  15  14  13  12  11                  Intervention comments:  - 45 minutes speech/language tx  "

## 2025-03-06 ENCOUNTER — ANESTHESIA (OUTPATIENT)
Dept: GASTROENTEROLOGY | Facility: HOSPITAL | Age: 55
End: 2025-03-06
Payer: COMMERCIAL

## 2025-03-06 ENCOUNTER — ANESTHESIA EVENT (OUTPATIENT)
Dept: GASTROENTEROLOGY | Facility: HOSPITAL | Age: 55
End: 2025-03-06
Payer: COMMERCIAL

## 2025-03-06 ENCOUNTER — HOSPITAL ENCOUNTER (OUTPATIENT)
Dept: GASTROENTEROLOGY | Facility: HOSPITAL | Age: 55
Setting detail: OUTPATIENT SURGERY
End: 2025-03-06
Payer: COMMERCIAL

## 2025-03-06 VITALS
BODY MASS INDEX: 27.08 KG/M2 | HEART RATE: 47 BPM | WEIGHT: 189.15 LBS | HEIGHT: 70 IN | RESPIRATION RATE: 16 BRPM | DIASTOLIC BLOOD PRESSURE: 79 MMHG | OXYGEN SATURATION: 96 % | SYSTOLIC BLOOD PRESSURE: 117 MMHG | TEMPERATURE: 97.7 F

## 2025-03-06 DIAGNOSIS — Z86.0100 HISTORY OF COLON POLYPS: ICD-10-CM

## 2025-03-06 PROCEDURE — 88305 TISSUE EXAM BY PATHOLOGIST: CPT | Performed by: PATHOLOGY

## 2025-03-06 PROCEDURE — 45380 COLONOSCOPY AND BIOPSY: CPT | Performed by: INTERNAL MEDICINE

## 2025-03-06 RX ORDER — PROPOFOL 10 MG/ML
INJECTION, EMULSION INTRAVENOUS AS NEEDED
Status: DISCONTINUED | OUTPATIENT
Start: 2025-03-06 | End: 2025-03-06

## 2025-03-06 RX ORDER — LIDOCAINE HYDROCHLORIDE 20 MG/ML
INJECTION, SOLUTION EPIDURAL; INFILTRATION; INTRACAUDAL; PERINEURAL AS NEEDED
Status: DISCONTINUED | OUTPATIENT
Start: 2025-03-06 | End: 2025-03-06

## 2025-03-06 RX ORDER — SODIUM CHLORIDE, SODIUM LACTATE, POTASSIUM CHLORIDE, CALCIUM CHLORIDE 600; 310; 30; 20 MG/100ML; MG/100ML; MG/100ML; MG/100ML
INJECTION, SOLUTION INTRAVENOUS CONTINUOUS PRN
Status: DISCONTINUED | OUTPATIENT
Start: 2025-03-06 | End: 2025-03-06

## 2025-03-06 RX ADMIN — PROPOFOL 40 MG: 10 INJECTION, EMULSION INTRAVENOUS at 09:23

## 2025-03-06 RX ADMIN — PROPOFOL 40 MG: 10 INJECTION, EMULSION INTRAVENOUS at 09:26

## 2025-03-06 RX ADMIN — PROPOFOL 40 MG: 10 INJECTION, EMULSION INTRAVENOUS at 09:29

## 2025-03-06 RX ADMIN — PROPOFOL 40 MG: 10 INJECTION, EMULSION INTRAVENOUS at 09:24

## 2025-03-06 RX ADMIN — LIDOCAINE HYDROCHLORIDE 80 MG: 20 INJECTION, SOLUTION EPIDURAL; INFILTRATION; INTRACAUDAL; PERINEURAL at 09:22

## 2025-03-06 RX ADMIN — PROPOFOL 40 MG: 10 INJECTION, EMULSION INTRAVENOUS at 09:25

## 2025-03-06 RX ADMIN — SODIUM CHLORIDE, SODIUM LACTATE, POTASSIUM CHLORIDE, AND CALCIUM CHLORIDE: .6; .31; .03; .02 INJECTION, SOLUTION INTRAVENOUS at 09:19

## 2025-03-06 RX ADMIN — PROPOFOL 40 MG: 10 INJECTION, EMULSION INTRAVENOUS at 09:22

## 2025-03-06 NOTE — INTERVAL H&P NOTE
H&P reviewed. After examining the patient I find no changes in the patients condition since the H&P had been written.    Vitals:    03/06/25 0810   BP: 138/83   Pulse: 56   Resp: 18   Temp: 98 °F (36.7 °C)   SpO2: 96%

## 2025-03-06 NOTE — H&P
History and Physical -  [facility]  Severiano Ball 55 y.o. male MRN: [id number]                  HPI: Severiano Ball is a 55 y.o. year old male who presents for colonoscopy for history of colon polyps.  Last colonoscopy 5 years ago      REVIEW OF SYSTEMS: Per the HPI, and otherwise unremarkable.    Historical Information   Past Medical History:   Diagnosis Date    Asthma     Erectile dysfunction     GERD (gastroesophageal reflux disease)     Hyperlipidemia     Kidney stone     Psoriasis     Squamous cell skin cancer 01/31/2023    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     Past Surgical History:   Procedure Laterality Date    COLONOSCOPY      HAND SURGERY Right     HERNIA REPAIR Bilateral 11/17/2021    Procedure: REPAIR HERNIA INGUINAL, LAPAROSCOPIC BILATERAL WITH MESH, TRANS ABDOMINAL;  Surgeon: Melvin Marquez MD;  Location: [facility];  Service: General    IR CAROTID STENT  06/2024    MOHS SURGERY  05/10/2023    Right cheek-Dr. Marie    KS ADJT TIS TRNS/REARGMT F/C/C/M/N/A/G/H/F 10SQCM/< Right 05/11/2023    Procedure: RECONSTRUCTION OF MOHS DEFECT RIGHT CHEEK WITH LOCAL FLAP;  Surgeon: Hola Tan MD;  Location: [facility];  Service: Plastics    SKIN BIOPSY      TONSILLECTOMY      WISDOM TOOTH EXTRACTION      teeth extraction     Social History   Social History     Substance and Sexual Activity   Alcohol Use Not Currently     Social History     Substance and Sexual Activity   Drug Use Not Currently    Frequency: 7.0 times per week    Types: Marijuana    Comment: Medical Marijuana     Social History     Tobacco Use   Smoking Status Former    Current packs/day: 1.50    Average packs/day: 1.5 packs/day for 35.0 years (52.5 ttl pk-yrs)    Types: Cigarettes   Smokeless Tobacco Former     Family History   Problem Relation Age of Onset    Diabetes Mother     Prostate cancer Father        Meds/Allergies     Not in a hospital admission.    No Known Allergies    Objective     There were no vitals taken  for this visit.      PHYSICAL EXAM    Gen: NAD  CV: RRR  CHEST: Clear  ABD: soft, NT/ND  EXT: no edema  Neuro: AAO      ASSESSMENT/PLAN:  This is a 55 y.o. year old male here for history of colon polyps    PLAN:   Procedure: Colonoscopy

## 2025-03-06 NOTE — ANESTHESIA POSTPROCEDURE EVALUATION
Post-Op Assessment Note    CV Status:  Stable  Pain Score: 0    Pain management: adequate       Mental Status:  Alert and awake   Hydration Status:  Euvolemic   PONV Controlled:  Controlled   Airway Patency:  Patent     Post Op Vitals Reviewed: Yes    No anethesia notable event occurred.            Last Filed PACU Vitals:  Vitals Value Taken Time   Temp     Pulse     BP     Resp     SpO2

## 2025-03-06 NOTE — ANESTHESIA PREPROCEDURE EVALUATION
Procedure:  COLONOSCOPY    Relevant Problems   CARDIO   (+) Hyperlipidemia      GI/HEPATIC   (+) GERD (gastroesophageal reflux disease)      NEURO/PSYCH   (+) Aphasia as late effect of cerebrovascular accident (CVA)   (+) Cerebrovascular accident (CVA) due to stenosis of cerebral artery (HCC)        Physical Exam    Airway    Mallampati score: III  TM Distance: >3 FB  Neck ROM: full     Dental   No notable dental hx     Cardiovascular  Cardiovascular exam normal    Pulmonary  Pulmonary exam normal     Other Findings    CVA--aphasia  Dentures very hard to take out--can leave in    Anesthesia Plan  ASA Score- 3     Anesthesia Type- IV sedation with anesthesia with ASA Monitors.         Additional Monitors:     Airway Plan:            Plan Factors-Exercise tolerance (METS): >4 METS.    Chart reviewed.    Patient summary reviewed.    Patient is not a current smoker.              Induction- intravenous.    Postoperative Plan-         Informed Consent- Anesthetic plan and risks discussed with patient.        NPO Status:  Vitals Value Taken Time   Date of last liquid 03/05/25 03/06/25 0805   Time of last liquid     Date of last solid 03/05/25 03/06/25 0805   Time of last solid

## 2025-03-07 ENCOUNTER — APPOINTMENT (OUTPATIENT)
Age: 55
End: 2025-03-07
Payer: COMMERCIAL

## 2025-03-10 ENCOUNTER — OFFICE VISIT (OUTPATIENT)
Age: 55
End: 2025-03-10
Payer: COMMERCIAL

## 2025-03-10 DIAGNOSIS — I63.50 CEREBROVASCULAR ACCIDENT (CVA) DUE TO STENOSIS OF CEREBRAL ARTERY (HCC): ICD-10-CM

## 2025-03-10 DIAGNOSIS — I69.320 APHASIA AS LATE EFFECT OF CEREBROVASCULAR ACCIDENT (CVA): Primary | ICD-10-CM

## 2025-03-10 PROCEDURE — 92507 TX SP LANG VOICE COMM INDIV: CPT

## 2025-03-11 PROCEDURE — 88305 TISSUE EXAM BY PATHOLOGIST: CPT | Performed by: PATHOLOGY

## 2025-03-12 ENCOUNTER — OFFICE VISIT (OUTPATIENT)
Age: 55
End: 2025-03-12
Payer: COMMERCIAL

## 2025-03-12 DIAGNOSIS — I63.50 CEREBROVASCULAR ACCIDENT (CVA) DUE TO STENOSIS OF CEREBRAL ARTERY (HCC): ICD-10-CM

## 2025-03-12 DIAGNOSIS — I69.320 APHASIA AS LATE EFFECT OF CEREBROVASCULAR ACCIDENT (CVA): Primary | ICD-10-CM

## 2025-03-12 PROCEDURE — 92507 TX SP LANG VOICE COMM INDIV: CPT

## 2025-03-12 NOTE — PROGRESS NOTES
"Daily Speech Treatment Note    Today's date: 3/12/2025   Patient’s name: Severiano Ball  : 1970  MRN: [id number]  Safety measures: Hx of CVA, hyperlipidemia, asthma   Referring provider: Selina Perez,*    Encounter Diagnosis     ICD-10-CM    1. 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 Cerebrovascular accident (CVA) due to stenosis of cerebral artery (HCC)  I63.50             Visit Tracking:  POC expires Unit limit Auth Expiration date PT/OT + Visit Limit?   25 N/A 2025 18                                           Visit/Unit Tracking  AUTH Status:  Date   IE  2/12  2/19  2/25  2/28  3/3  3/5  PU  3/10  3/12         Yes Used 1  2  3  4  5  6  7  1  2           Remaining  17  16  15  14  13  12  11  10  9              Subjective/Behavioral:  -Patient pleasant, engaged, and cooperative. Patient attended today's session unaccompanied. Patient's wife reported patient went to visit his father and his father seems to be understanding him more.     Objective/Assessment: Patient presented with improved self-monitoring of productions when completing automatic sequences compared to last session. Patient demonstrated most difficulty verbalizing \"yes\" and \"no\" when answering basic Y/N questions, although he would refer to the correct visual/orthographic cue (e.g. green and red \"yes/no\" buttons, \"yes/no\" sticky notes in green and red).       Short-term goals:  Patient will answer basic/simple Y/N questions with 60% accuracy, given max cues, to facilitate increased auditory comprehension skills, to be achieved in 10-12 weeks.   3/10/25: Patient answered basic personal Y/N questions with 50% accuracy, IND! Patient answered Y/N questions with 80% accuracy given max cues including models and orthographic cues.  3/12/25: Patient answered basic/personal Y/N questions with 50% accuracy given orthographic cues and verbal prompts; 88% accuracy with max cues/models.     Patient " "will follow one-step verbal directions (e.g., close your eyes) with 60% accuracy, given max fading to mod cues, to facilitate increased auditory comprehension skills, to be achieved in 10-12 weeks.   3/12/25: Patient followed basic 1-step directions (e.g. \"look up at the ceiling\") 68% accuracy given max cues including errorless learning, orthographic cues, direct verbal instruction, immediate, direct verbal feedback, and hand-over-hand physical assist. First 7 targeted directions given for HEP.    Patient will complete naming of automatic tasks (e.g., counting, listing days of the week/months of the year, etc.) with 100% accuracy given max cues to facilitate increased verbal expression skills, to be achieved in 6-8 weeks.   3/10/25: Patient stated the days of the week with 67% accuracy given max cues including errorless learning, orthographic cues, and visuals for pacing. Patient stated the months of the year with 0% accuracy given max cues. Given as HEP.  3/12/25: Patient stated the days of the week with 60% accuracy given orthographic/visual cues!! Accuracy increased to 80% given max cues including initial phoneme and initial syllable cues, gestures, and models, given in a least-to-most prompt hierarchy. Continue with HEP. He stated the months of the year with 0% accuracy given max cues including immediate models.     Patient will relay personal information (e.g. name, date of birth, address), w/ or w/o the use of MADISON principles, with 100% accuracy given max cues, to facilitate increased verbal expression skills, to be achieved in 10-12 weeks.   3/10/25: Patient relayed name in 2/2 opportunities with mod verbal and visual cues. He stated his age with 0% accuracy given max cues including models, orthographic cues, and backward chaining. Deferred task and ended tx a few minutes early 2/2 increased anger/frustration with task.     Plan:  -Patient was provided with home exercises/activities to target goals in plan of " care at the end of today's session.  -Discussed session and patient's progress with caregiver/family member after today's session.  -Continue with current plan of care.;

## 2025-03-14 ENCOUNTER — APPOINTMENT (OUTPATIENT)
Age: 55
End: 2025-03-14
Payer: COMMERCIAL

## 2025-03-17 ENCOUNTER — APPOINTMENT (OUTPATIENT)
Age: 55
End: 2025-03-17
Payer: COMMERCIAL

## 2025-03-19 ENCOUNTER — APPOINTMENT (OUTPATIENT)
Age: 55
End: 2025-03-19
Payer: COMMERCIAL

## 2025-03-20 ENCOUNTER — HOSPITAL ENCOUNTER (OUTPATIENT)
Dept: VASCULAR ULTRASOUND | Facility: HOSPITAL | Age: 55
Discharge: HOME | End: 2025-03-20
Payer: COMMERCIAL

## 2025-03-20 DIAGNOSIS — I65.29 CAROTID STENOSIS: ICD-10-CM

## 2025-03-20 PROCEDURE — 93880 EXTRACRANIAL BILAT STUDY: CPT

## 2025-03-21 ENCOUNTER — OFFICE VISIT (OUTPATIENT)
Age: 55
End: 2025-03-21
Payer: COMMERCIAL

## 2025-03-21 ENCOUNTER — RESULTS FOLLOW-UP (OUTPATIENT)
Dept: NEUROLOGY | Facility: CLINIC | Age: 55
End: 2025-03-21

## 2025-03-21 DIAGNOSIS — I69.320 APHASIA AS LATE EFFECT OF CEREBROVASCULAR ACCIDENT (CVA): Primary | ICD-10-CM

## 2025-03-21 DIAGNOSIS — I63.50 CEREBROVASCULAR ACCIDENT (CVA) DUE TO STENOSIS OF CEREBRAL ARTERY (HCC): ICD-10-CM

## 2025-03-21 PROCEDURE — 92507 TX SP LANG VOICE COMM INDIV: CPT

## 2025-03-21 PROCEDURE — 93880 EXTRACRANIAL BILAT STUDY: CPT | Performed by: INTERNAL MEDICINE

## 2025-03-21 NOTE — PROGRESS NOTES
"Daily Speech Treatment Note    Today's date: 3/21/2025   Patient’s name: Severiano Ball  : 1970  MRN: [id number]  Safety measures: Hx of CVA, hyperlipidemia, asthma   Referring provider: Selina Perez,*    Encounter Diagnosis     ICD-10-CM    1. 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 Cerebrovascular accident (CVA) due to stenosis of cerebral artery (HCC)  I63.50               Visit Tracking:  POC expires Unit limit Auth Expiration date PT/OT + Visit Limit?   25 N/A 2025 18                                           Visit/Unit Tracking  AUTH Status:  Date   IE  2/12  2/19  2/25  2/28  3/3  3/5  PU  3/10  3/12  3/21       Yes Used 1  2  3  4  5  6  7  1  2  3         Remaining  17  16  15  14  13  12  11  10  9  8            Subjective/Behavioral:  -Patient pleasant, engaged, and cooperative. Patient attended today's session unaccompanied. Patient reported he has been practicing and \"it's been better\".    Objective/Assessment: Patient presented with improved self-monitoring of productions across tasks; however; he demonstrated a significant increase in neologisms when attempting to repeat clinician models. Patient became very frustrated and requested to move on to different tasks. Continue HEP across all skills given.       Short-term goals:  Patient will answer basic/simple Y/N questions with 60% accuracy, given max cues, to facilitate increased auditory comprehension skills, to be achieved in 10-12 weeks.   3/10/25: Patient answered basic personal Y/N questions with 50% accuracy, IND! Patient answered Y/N questions with 80% accuracy given max cues including models and orthographic cues.  3/12/25: Patient answered basic/personal Y/N questions with 50% accuracy given orthographic cues and verbal prompts; 88% accuracy with max cues/models.   3/21/25: Patient answered basic/personal Y/N questions with 25% accuracy given max cues including immediate models, " "orthographic cues, and visuals.     Patient will follow one-step verbal directions (e.g., close your eyes) with 60% accuracy, given max fading to mod cues, to facilitate increased auditory comprehension skills, to be achieved in 10-12 weeks.   3/12/25: Patient followed basic 1-step directions (e.g. \"look up at the ceiling\") 68% accuracy given max cues including errorless learning, orthographic cues, direct verbal instruction, immediate, direct verbal feedback, and hand-over-hand physical assist. First 7 targeted directions given for HEP.    Patient will complete naming of automatic tasks (e.g., counting, listing days of the week/months of the year, etc.) with 100% accuracy given max cues to facilitate increased verbal expression skills, to be achieved in 6-8 weeks.   3/10/25: Patient stated the days of the week with 67% accuracy given max cues including errorless learning, orthographic cues, and visuals for pacing. Patient stated the months of the year with 0% accuracy given max cues. Given as HEP.  3/12/25: Patient stated the days of the week with 60% accuracy given orthographic/visual cues!! Accuracy increased to 80% given max cues including initial phoneme and initial syllable cues, gestures, and models, given in a least-to-most prompt hierarchy. Continue with HEP. He stated the months of the year with 0% accuracy given max cues including immediate models.   3/21/25: Patient stated the days of the week with 80% accuracy given max cues; he stated the months of the year with 0% accuracy given max cues.     Patient will relay personal information (e.g. name, date of birth, address), w/ or w/o the use of MADISON principles, with 100% accuracy given max cues, to facilitate increased verbal expression skills, to be achieved in 10-12 weeks.   3/10/25: Patient relayed name in 2/2 opportunities with mod verbal and visual cues. He stated his age with 0% accuracy given max cues including models, orthographic cues, and backward " chaining. Deferred task and ended tx a few minutes early 2/2 increased anger/frustration with task.   3/21/25: Patient relayed his name with 57% accuracy and his age with 0% accuracy given max cues including errorless learning.     Plan:  -Patient was provided with home exercises/activities to target goals in plan of care at the end of today's session.  -Discussed session and patient's progress with caregiver/family member after today's session.  -Continue with current plan of care.;

## 2025-03-24 ENCOUNTER — OFFICE VISIT (OUTPATIENT)
Age: 55
End: 2025-03-24
Payer: COMMERCIAL

## 2025-03-24 DIAGNOSIS — I63.50 CEREBROVASCULAR ACCIDENT (CVA) DUE TO STENOSIS OF CEREBRAL ARTERY (HCC): ICD-10-CM

## 2025-03-24 DIAGNOSIS — I69.320 APHASIA AS LATE EFFECT OF CEREBROVASCULAR ACCIDENT (CVA): Primary | ICD-10-CM

## 2025-03-24 PROCEDURE — 92507 TX SP LANG VOICE COMM INDIV: CPT

## 2025-03-24 NOTE — PROGRESS NOTES
"Daily Speech Treatment Note    Today's date: 3/24/2025   Patient’s name: Severiano Ball  : 1970  MRN: [id number]  Safety measures: Hx of CVA, hyperlipidemia, asthma   Referring provider: Selina Perez,*    Encounter Diagnosis     ICD-10-CM    1. 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 Cerebrovascular accident (CVA) due to stenosis of cerebral artery (HCC)  I63.50             Visit Tracking:  POC expires Unit limit Auth Expiration date PT/OT + Visit Limit?   25 N/A 2025 18                                           Visit/Unit Tracking  AUTH Status:  Date   IE  2/12  2/19  2/25  2/28  3/3  3/5  PU  3/10  3/12  3/21  3/24     Yes Used 1  2  3  4  5  6  7  1  2  3  4       Remaining  17  16  15  14  13  12  11  10  9  8  7          Subjective/Behavioral:  -Patient pleasant, engaged, and cooperative. Patient attended today's session unaccompanied. No new concerns reported.     Objective/Assessment: Patient presented with continued improved self-monitoring of productions; orthographic cue of \"j\" successful in initiating \"January\" for the months of the year automatic sequence. Increased literal paraphasias noted.    Short-term goals:  Patient will answer basic/simple Y/N questions with 60% accuracy, given max cues, to facilitate increased auditory comprehension skills, to be achieved in 10-12 weeks.   3/10/25: Patient answered basic personal Y/N questions with 50% accuracy, IND! Patient answered Y/N questions with 80% accuracy given max cues including models and orthographic cues.  3/12/25: Patient answered basic/personal Y/N questions with 50% accuracy given orthographic cues and verbal prompts; 88% accuracy with max cues/models.   3/21/25: Patient answered basic/personal Y/N questions with 25% accuracy given max cues including immediate models, orthographic cues, and visuals.   3/24/25: Patient answered basic/personal Y/N questions with 80% accuracy given min " "visual and orthographic cues. Accuracy increased to 100% given mod verbal, visual, and gesture cues. Continue with HEP.     Patient will follow one-step verbal directions (e.g., close your eyes) with 60% accuracy, given max fading to mod cues, to facilitate increased auditory comprehension skills, to be achieved in 10-12 weeks.   3/12/25: Patient followed basic 1-step directions (e.g. \"look up at the ceiling\") 68% accuracy given max cues including errorless learning, orthographic cues, direct verbal instruction, immediate, direct verbal feedback, and hand-over-hand physical assist. First 7 targeted directions given for HEP.  3/24/25: Patient followed simple 1-step directions (e.g. \"close your eyes\") with 83% accuracy given max cues including clinician models and orthographic cues. Continue with HEP.     Patient will complete naming of automatic tasks (e.g., counting, listing days of the week/months of the year, etc.) with 100% accuracy given max cues to facilitate increased verbal expression skills, to be achieved in 6-8 weeks.   3/10/25: Patient stated the days of the week with 67% accuracy given max cues including errorless learning, orthographic cues, and visuals for pacing. Patient stated the months of the year with 0% accuracy given max cues. Given as HEP.  3/12/25: Patient stated the days of the week with 60% accuracy given orthographic/visual cues!! Accuracy increased to 80% given max cues including initial phoneme and initial syllable cues, gestures, and models, given in a least-to-most prompt hierarchy. Continue with HEP. He stated the months of the year with 0% accuracy given max cues including immediate models.   3/21/25: Patient stated the days of the week with 80% accuracy given max cues; he stated the months of the year with 0% accuracy given max cues.   3/24/25: Patient stated the days of the week with 40% accuracy given orthographic cues; 50% accuracy with mod verbal and visual cues; 70% accuracy " given max cues including models. Patient stated the months of the year with 10% accuracy given max cues including models and orthographic cues. Continue with HEP.     Patient will relay personal information (e.g. name, date of birth, address), w/ or w/o the use of MADISON principles, with 100% accuracy given max cues, to facilitate increased verbal expression skills, to be achieved in 10-12 weeks.   3/10/25: Patient relayed name in 2/2 opportunities with mod verbal and visual cues. He stated his age with 0% accuracy given max cues including models, orthographic cues, and backward chaining. Deferred task and ended tx a few minutes early 2/2 increased anger/frustration with task.   3/21/25: Patient relayed his name with 57% accuracy and his age with 0% accuracy given max cues including errorless learning.     Plan:  -Patient was provided with home exercises/activities to target goals in plan of care at the end of today's session.  -Discussed session and patient's progress with caregiver/family member after today's session.  -Continue with current plan of care.;

## 2025-03-26 ENCOUNTER — OFFICE VISIT (OUTPATIENT)
Age: 55
End: 2025-03-26
Payer: COMMERCIAL

## 2025-03-26 DIAGNOSIS — I63.50 CEREBROVASCULAR ACCIDENT (CVA) DUE TO STENOSIS OF CEREBRAL ARTERY (HCC): ICD-10-CM

## 2025-03-26 DIAGNOSIS — I69.320 APHASIA AS LATE EFFECT OF CEREBROVASCULAR ACCIDENT (CVA): Primary | ICD-10-CM

## 2025-03-26 PROCEDURE — 92507 TX SP LANG VOICE COMM INDIV: CPT

## 2025-03-26 NOTE — PROGRESS NOTES
"Daily Speech Treatment Note    Today's date: 3/26/2025   Patient’s name: Severiano Ball  : 1970  MRN: [id number]  Safety measures: Hx of CVA, hyperlipidemia, asthma   Referring provider: Selina Perez,*    Encounter Diagnosis     ICD-10-CM    1. 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 Cerebrovascular accident (CVA) due to stenosis of cerebral artery (HCC)  I63.50               Visit Tracking:  POC expires Unit limit Auth Expiration date PT/OT + Visit Limit?   25 N/A 2025 18                                           Visit/Unit Tracking  AUTH Status:  Date  3/5  PU  3/10  3/12  3/21  3/24  3/26       Yes Used  7  1  2  3  4  5         Remaining   11  10  9  8  7  6            Subjective/Behavioral:  -Patient pleasant, engaged, and cooperative. Patient attended today's session unaccompanied. No new concerns reported.     Objective/Assessment: Patient presented with continued improved self-monitoring of incorrect productions (e.g. \"that wasn't right, was it?\" Or \"that didn't sound right\").     Short-term goals:  Patient will answer basic/simple Y/N questions with 60% accuracy, given max cues, to facilitate increased auditory comprehension skills, to be achieved in 10-12 weeks.   3/10/25: Patient answered basic personal Y/N questions with 50% accuracy, IND! Patient answered Y/N questions with 80% accuracy given max cues including models and orthographic cues.  3/12/25: Patient answered basic/personal Y/N questions with 50% accuracy given orthographic cues and verbal prompts; 88% accuracy with max cues/models.   3/21/25: Patient answered basic/personal Y/N questions with 25% accuracy given max cues including immediate models, orthographic cues, and visuals.   3/24/25: Patient answered basic/personal Y/N questions with 80% accuracy given min visual and orthographic cues. Accuracy increased to 100% given mod verbal, visual, and gesture cues. Continue with HEP. " "  3/26/25: Patient answered basic/personal Y/N questions with 20% accuracy, IND! Accuracy increased to 60% with min orthographic cue; 90% accuracy with mod verbal and visual cues (semantic, verbal shaping, orthographic, color coding green/yes and red/no). Continue with HEP.    Patient will follow one-step verbal directions (e.g., close your eyes) with 60% accuracy, given max fading to mod cues, to facilitate increased auditory comprehension skills, to be achieved in 10-12 weeks.   3/12/25: Patient followed basic 1-step directions (e.g. \"look up at the ceiling\") 68% accuracy given max cues including errorless learning, orthographic cues, direct verbal instruction, immediate, direct verbal feedback, and hand-over-hand physical assist. First 7 targeted directions given for HEP.  3/24/25: Patient followed simple 1-step directions (e.g. \"close your eyes\") with 83% accuracy given max cues including clinician models and orthographic cues. Continue with HEP.     Patient will complete naming of automatic tasks (e.g., counting, listing days of the week/months of the year, etc.) with 100% accuracy given max cues to facilitate increased verbal expression skills, to be achieved in 6-8 weeks.   3/10/25: Patient stated the days of the week with 67% accuracy given max cues including errorless learning, orthographic cues, and visuals for pacing. Patient stated the months of the year with 0% accuracy given max cues. Given as HEP.  3/12/25: Patient stated the days of the week with 60% accuracy given orthographic/visual cues!! Accuracy increased to 80% given max cues including initial phoneme and initial syllable cues, gestures, and models, given in a least-to-most prompt hierarchy. Continue with HEP. He stated the months of the year with 0% accuracy given max cues including immediate models.   3/21/25: Patient stated the days of the week with 80% accuracy given max cues; he stated the months of the year with 0% accuracy given max " cues.   3/24/25: Patient stated the days of the week with 40% accuracy given orthographic cues; 50% accuracy with mod verbal and visual cues; 70% accuracy given max cues including models. Patient stated the months of the year with 10% accuracy given max cues including models and orthographic cues. Continue with HEP.   3/26/25: Patient stated days of the week with 50% accuracy given orthographic cues; accuracy increased to 100% with mod verbal and visual cues. Patient stated the months of the year with 0% accuracy given max cues. Continue with HEP.    Patient will relay personal information (e.g. name, date of birth, address), w/ or w/o the use of MADISON principles, with 100% accuracy given max cues, to facilitate increased verbal expression skills, to be achieved in 10-12 weeks.   3/10/25: Patient relayed name in 2/2 opportunities with mod verbal and visual cues. He stated his age with 0% accuracy given max cues including models, orthographic cues, and backward chaining. Deferred task and ended tx a few minutes early 2/2 increased anger/frustration with task.   3/21/25: Patient relayed his name with 57% accuracy and his age with 0% accuracy given max cues including errorless learning.   3/26/25: Patient relayed his name with 40% accuracy and age with 0% accuracy given max cues including errorless learning, verbal shaping of answer, and orthographic cues. Continue with HEP.     Plan:  -Patient was provided with home exercises/activities to target goals in plan of care at the end of today's session.  -Discussed session and patient's progress with caregiver/family member after today's session.  -Continue with current plan of care.;

## 2025-03-28 ENCOUNTER — OFFICE VISIT (OUTPATIENT)
Age: 55
End: 2025-03-28
Payer: COMMERCIAL

## 2025-03-28 DIAGNOSIS — I63.50 CEREBROVASCULAR ACCIDENT (CVA) DUE TO STENOSIS OF CEREBRAL ARTERY (HCC): ICD-10-CM

## 2025-03-28 DIAGNOSIS — I69.320 APHASIA AS LATE EFFECT OF CEREBROVASCULAR ACCIDENT (CVA): Primary | ICD-10-CM

## 2025-03-28 PROCEDURE — 92507 TX SP LANG VOICE COMM INDIV: CPT

## 2025-03-28 NOTE — PROGRESS NOTES
Daily Speech Treatment Note    Today's date: 3/28/2025   Patient’s name: Severiano Ball  : 1970  MRN: [id number]  Safety measures: Hx of CVA, hyperlipidemia, asthma   Referring provider: Selina Perez,*    Encounter Diagnosis     ICD-10-CM    1. 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 Cerebrovascular accident (CVA) due to stenosis of cerebral artery (HCC)  I63.50             Visit Tracking:  POC expires Unit limit Auth Expiration date PT/OT + Visit Limit?   25 N/A 2025 18                                           Visit/Unit Tracking  AUTH Status:  Date  3/5  PU  3/10  3/12  3/21  3/24  3/26 3/28      Yes Used  7  1  2  3  4  5 6        Remaining   11  10  9  8  7  6 5           Subjective/Behavioral:  -Patient pleasant, engaged, and cooperative. Patient attended today's session unaccompanied. No new concerns reported.     Objective/Assessment: Patient demonstrated increased neologisms and paraphasias today, particularly during automatic sequences with months of the year.    Short-term goals:  Patient will answer basic/simple Y/N questions with 60% accuracy, given max cues, to facilitate increased auditory comprehension skills, to be achieved in 10-12 weeks.   3/10/25: Patient answered basic personal Y/N questions with 50% accuracy, IND! Patient answered Y/N questions with 80% accuracy given max cues including models and orthographic cues.  3/12/25: Patient answered basic/personal Y/N questions with 50% accuracy given orthographic cues and verbal prompts; 88% accuracy with max cues/models.   3/21/25: Patient answered basic/personal Y/N questions with 25% accuracy given max cues including immediate models, orthographic cues, and visuals.   3/24/25: Patient answered basic/personal Y/N questions with 80% accuracy given min visual and orthographic cues. Accuracy increased to 100% given mod verbal, visual, and gesture cues. Continue with HEP.   3/26/25: Patient  "answered basic/personal Y/N questions with 20% accuracy, IND! Accuracy increased to 60% with min orthographic cue; 90% accuracy with mod verbal and visual cues (semantic, verbal shaping, orthographic, color coding green/yes and red/no). Continue with HEP.    Patient will follow one-step verbal directions (e.g., close your eyes) with 60% accuracy, given max fading to mod cues, to facilitate increased auditory comprehension skills, to be achieved in 10-12 weeks.   3/12/25: Patient followed basic 1-step directions (e.g. \"look up at the ceiling\") 68% accuracy given max cues including errorless learning, orthographic cues, direct verbal instruction, immediate, direct verbal feedback, and hand-over-hand physical assist. First 7 targeted directions given for HEP.  3/24/25: Patient followed simple 1-step directions (e.g. \"close your eyes\") with 83% accuracy given max cues including clinician models and orthographic cues. Continue with HEP.   3/28/25: Patient followed 1-step directions with 100% accuracy given max cues including clinician models, orthographic, and tactile cues. Given as HEP.    Patient will complete naming of automatic tasks (e.g., counting, listing days of the week/months of the year, etc.) with 100% accuracy given max cues to facilitate increased verbal expression skills, to be achieved in 6-8 weeks.   3/10/25: Patient stated the days of the week with 67% accuracy given max cues including errorless learning, orthographic cues, and visuals for pacing. Patient stated the months of the year with 0% accuracy given max cues. Given as HEP.  3/12/25: Patient stated the days of the week with 60% accuracy given orthographic/visual cues!! Accuracy increased to 80% given max cues including initial phoneme and initial syllable cues, gestures, and models, given in a least-to-most prompt hierarchy. Continue with HEP. He stated the months of the year with 0% accuracy given max cues including immediate models.   3/21/25: " Patient stated the days of the week with 80% accuracy given max cues; he stated the months of the year with 0% accuracy given max cues.   3/24/25: Patient stated the days of the week with 40% accuracy given orthographic cues; 50% accuracy with mod verbal and visual cues; 70% accuracy given max cues including models. Patient stated the months of the year with 10% accuracy given max cues including models and orthographic cues. Continue with HEP.   3/26/25: Patient stated days of the week with 50% accuracy given orthographic cues; accuracy increased to 100% with mod verbal and visual cues. Patient stated the months of the year with 0% accuracy given max cues. Continue with HEP.  3/28/25: Patient stated the days of the week with 60% accuracy given min gesture and orthographic cues. Accuracy increased to 100% given mod verbal and visual cues. Patient stated the months of the year with 25% accuracy given max cues including errorless learning, orthographic cues, and verbal prompts. Continue with HEP.     Patient will relay personal information (e.g. name, date of birth, address), w/ or w/o the use of MADISON principles, with 100% accuracy given max cues, to facilitate increased verbal expression skills, to be achieved in 10-12 weeks.   3/10/25: Patient relayed name in 2/2 opportunities with mod verbal and visual cues. He stated his age with 0% accuracy given max cues including models, orthographic cues, and backward chaining. Deferred task and ended tx a few minutes early 2/2 increased anger/frustration with task.   3/21/25: Patient relayed his name with 57% accuracy and his age with 0% accuracy given max cues including errorless learning.   3/26/25: Patient relayed his name with 40% accuracy and age with 0% accuracy given max cues including errorless learning, verbal shaping of answer, and orthographic cues. Continue with HEP.     Plan:  -Patient was provided with home exercises/activities to target goals in plan of care at  the end of today's session.  -Discussed session and patient's progress with caregiver/family member after today's session.  -Continue with current plan of care.;

## 2025-03-31 ENCOUNTER — OFFICE VISIT (OUTPATIENT)
Age: 55
End: 2025-03-31
Payer: COMMERCIAL

## 2025-03-31 DIAGNOSIS — I63.50 CEREBROVASCULAR ACCIDENT (CVA) DUE TO STENOSIS OF CEREBRAL ARTERY (HCC): ICD-10-CM

## 2025-03-31 DIAGNOSIS — I69.320 APHASIA AS LATE EFFECT OF CEREBROVASCULAR ACCIDENT (CVA): Primary | ICD-10-CM

## 2025-03-31 PROCEDURE — 92507 TX SP LANG VOICE COMM INDIV: CPT

## 2025-03-31 NOTE — PROGRESS NOTES
Daily Speech Treatment Note    Today's date: 3/31/2025   Patient’s name: Severiano Ball  : 1970  MRN: [id number]  Safety measures: Hx of CVA, hyperlipidemia, asthma   Referring provider: Selina Perez,*    Encounter Diagnosis     ICD-10-CM    1. 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 Cerebrovascular accident (CVA) due to stenosis of cerebral artery (HCC)  I63.50               Visit Tracking:  POC expires Unit limit Auth Expiration date PT/OT + Visit Limit?   25 N/A 2025 18                                           Visit/Unit Tracking  AUTH Status:  Date  3/5  PU  3/10  3/12  3/21  3/24  3/26 3/28 3/31     Yes Used  7  1  2  3  4  5 6 7       Remaining   11  10  9  8  7  6 5 4          Subjective/Behavioral:  -Patient pleasant, engaged, and cooperative. Patient attended today's session unaccompanied. No new concerns reported.     Objective/Assessment: Patient presented with increase difficulty following models/repeating targets today. Improved reading fluency at sight word level noted.     Short-term goals:  Patient will answer basic/simple Y/N questions with 60% accuracy, given max cues, to facilitate increased auditory comprehension skills, to be achieved in 10-12 weeks.   3/10/25: Patient answered basic personal Y/N questions with 50% accuracy, IND! Patient answered Y/N questions with 80% accuracy given max cues including models and orthographic cues.  3/12/25: Patient answered basic/personal Y/N questions with 50% accuracy given orthographic cues and verbal prompts; 88% accuracy with max cues/models.   3/21/25: Patient answered basic/personal Y/N questions with 25% accuracy given max cues including immediate models, orthographic cues, and visuals.   3/24/25: Patient answered basic/personal Y/N questions with 80% accuracy given min visual and orthographic cues. Accuracy increased to 100% given mod verbal, visual, and gesture cues. Continue with HEP.  "  3/26/25: Patient answered basic/personal Y/N questions with 20% accuracy, IND! Accuracy increased to 60% with min orthographic cue; 90% accuracy with mod verbal and visual cues (semantic, verbal shaping, orthographic, color coding green/yes and red/no). Continue with HEP.    Patient will follow one-step verbal directions (e.g., close your eyes) with 60% accuracy, given max fading to mod cues, to facilitate increased auditory comprehension skills, to be achieved in 10-12 weeks.   3/12/25: Patient followed basic 1-step directions (e.g. \"look up at the ceiling\") 68% accuracy given max cues including errorless learning, orthographic cues, direct verbal instruction, immediate, direct verbal feedback, and hand-over-hand physical assist. First 7 targeted directions given for HEP.  3/24/25: Patient followed simple 1-step directions (e.g. \"close your eyes\") with 83% accuracy given max cues including clinician models and orthographic cues. Continue with HEP.   3/28/25: Patient followed 1-step directions with 100% accuracy given max cues including clinician models, orthographic, and tactile cues. Given as HEP.    Patient will complete naming of automatic tasks (e.g., counting, listing days of the week/months of the year, etc.) with 100% accuracy given max cues to facilitate increased verbal expression skills, to be achieved in 6-8 weeks.   3/10/25: Patient stated the days of the week with 67% accuracy given max cues including errorless learning, orthographic cues, and visuals for pacing. Patient stated the months of the year with 0% accuracy given max cues. Given as HEP.  3/12/25: Patient stated the days of the week with 60% accuracy given orthographic/visual cues!! Accuracy increased to 80% given max cues including initial phoneme and initial syllable cues, gestures, and models, given in a least-to-most prompt hierarchy. Continue with HEP. He stated the months of the year with 0% accuracy given max cues including immediate " models.   3/21/25: Patient stated the days of the week with 80% accuracy given max cues; he stated the months of the year with 0% accuracy given max cues.   3/24/25: Patient stated the days of the week with 40% accuracy given orthographic cues; 50% accuracy with mod verbal and visual cues; 70% accuracy given max cues including models. Patient stated the months of the year with 10% accuracy given max cues including models and orthographic cues. Continue with HEP.   3/26/25: Patient stated days of the week with 50% accuracy given orthographic cues; accuracy increased to 100% with mod verbal and visual cues. Patient stated the months of the year with 0% accuracy given max cues. Continue with HEP.  3/28/25: Patient stated the days of the week with 60% accuracy given min gesture and orthographic cues. Accuracy increased to 100% given mod verbal and visual cues. Patient stated the months of the year with 25% accuracy given max cues including errorless learning, orthographic cues, and verbal prompts. Continue with HEP.   3/31/25: Patient counted from 1-10 with 90% accuracy given mod verbal, visual, gesture, and orthographic cues. Accuracy maintained at 90% given a model/max cues. Patient stated the days of the week with 90% accuracy given mod verbal, visual, gesture, and orthographic cues.     Patient will relay personal information (e.g. name, date of birth, address), w/ or w/o the use of MADISON principles, with 100% accuracy given max cues, to facilitate increased verbal expression skills, to be achieved in 10-12 weeks.   3/10/25: Patient relayed name in 2/2 opportunities with mod verbal and visual cues. He stated his age with 0% accuracy given max cues including models, orthographic cues, and backward chaining. Deferred task and ended tx a few minutes early 2/2 increased anger/frustration with task.   3/21/25: Patient relayed his name with 57% accuracy and his age with 0% accuracy given max cues including errorless  learning.   3/26/25: Patient relayed his name with 40% accuracy and age with 0% accuracy given max cues including errorless learning, verbal shaping of answer, and orthographic cues. Continue with HEP.   3/31/25: Patient stated his name with 50% accuracy given max cues; age with 50% accuracy given max cues - verbal, visual, gesture, orthographic, and models. Continue with HEP.     Plan:  -Patient was provided with home exercises/activities to target goals in plan of care at the end of today's session.  -Discussed session and patient's progress with caregiver/family member after today's session.  -Continue with current plan of care.;

## 2025-04-04 ENCOUNTER — EVALUATION (OUTPATIENT)
Age: 55
End: 2025-04-04
Payer: COMMERCIAL

## 2025-04-04 DIAGNOSIS — I69.320 APHASIA AS LATE EFFECT OF CEREBROVASCULAR ACCIDENT (CVA): Primary | ICD-10-CM

## 2025-04-04 DIAGNOSIS — I63.50 CEREBROVASCULAR ACCIDENT (CVA) DUE TO STENOSIS OF CEREBRAL ARTERY (HCC): ICD-10-CM

## 2025-04-04 PROCEDURE — 92507 TX SP LANG VOICE COMM INDIV: CPT

## 2025-04-04 NOTE — PROGRESS NOTES
"Speech-Language Pathology Re-Evaluation    Today's date: 2025   Patient’s name: Severiano Ball  : 1970  MRN: [id number]  Safety measures: Hx of CVA, hyperlipidemia, asthma   Referring provider: Selina Perez,*    Encounter Diagnosis     ICD-10-CM    1. 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 Cerebrovascular accident (CVA) due to stenosis of cerebral artery (HCC)  I63.50           Assessment:   Patient continues to present with mixed severe aphasia c/b significant deficits in both receptive and expressive language, including the ability to identify and name common/functional items, follow simple and complex, multi-step commands, answer Y/N questions, relay personal information, state automatic sequences (e.g. days of the week), significantly reduced auditory comprehension for longer utterances, and significant limitations contributing to conversations 2/2 language processing and verbal expression deficits.   Patient's spouse reports \"he is talking more at home and with friends on the phone and seems to be doing better\". Patient continues to make very slow progress on goals; however, he's demonstrated a more consistent improvement in self-monitoring abilities. Patient works hard during sessions, and experiences significant increases in frustration 2/2 numerous communication breakdowns. Patient benefits from breaks from structured tasks; verbal cues to take deep breaths, and distractions to help alleviate frustration, which often causes an increase in paraphasias and neologisms. Patient reported inconsistent completion of HEP because \"it's hard\". Patient educated on importance of daily practice in order to facilitate improved carryover of skills and performance. Patient and wife expressed verbal agreement with completing HEP consistently. Patient continues to benefit from consistent multimodal cues of all available cueing types (I.e. gesture, orthographic, " visual, verbal, tactile-kinesthetic, and models). Patient would benefit from continued outpatient skilled Speech Therapy services to support the highest level of independence with his PLOF, rehabilitate expressive/receptive language skills, promote positive communication interactions with both familiar & unfamiliar listeners, patient and caregiver education/training in compensatory strategies, increase communication of wants/needs, utilize expressive/receptive language within functional activities, participate in meaningful activities, allow for increased socialization, promote safety, reduce caregiver burden, and facilitate overall improved quality of life.           Short-term goals:  Patient will answer basic/simple Y/N questions with 60% accuracy, given max cues, to facilitate increased auditory comprehension skills, to be achieved in 10-12 weeks. ONGOING  Patient will follow one-step verbal directions (e.g., close your eyes) with 60% accuracy, given max fading to mod cues, to facilitate increased auditory comprehension skills, to be achieved in 10-12 weeks. ONGOING  Patient will complete naming of automatic tasks (e.g., counting, listing days of the week/months of the year, etc.) with 100% accuracy given max cues to facilitate increased verbal expression skills, to be achieved in 6-8 weeks. ONGOING  Patient will relay personal information (e.g. name, date of birth, address), w/ or w/o the use of MADISON principles, with 100% accuracy given max cues, to facilitate increased verbal expression skills, to be achieved in 10-12 weeks. ONGOING     Long-term goals:  Patient will demonstrate improved expressive and receptive language skills during structured and unstructured tasks by discharge. ONGOING  Patient will improve ability to facilitate communication to meet needs including use of compensatory strategies to promote meaningful interactions for improved quality of life and maximize level of independence.  "ONGOING      Plan:  Patient would benefit from outpatient skilled Speech Therapy services: Speech-language therapy    Frequency: 3-4x weekly  Duration: 1 month    Intervention certification from: 2025  Intervention certification to: 2025      Subjective:  -Patient pleasant, engaged, and cooperative. Patient attended today's session unaccompanied. No new concerns reported.       Patient's goal(s): \"Just be able to talk.\"    Pain: Absent (scale:  N/A )      Objective (testing):  No formal testing administered as today's session serves as a progress update.       Treatment:  Patient will complete naming of automatic tasks (e.g., counting, listing days of the week/months of the year, etc.) with 100% accuracy given max cues to facilitate increased verbal expression skills, to be achieved in 6-8 weeks.   25: Patient counted from 1-10 1x with 90% accuracy given an orthographic cue. Patient stated the days of the week with 40% accuracy given min orthographic cues, 80% accuracy given max cues including visual/orthographic, gesture, verbal prompts, and models. Continue with HEP.     Patient will relay personal information (e.g. name, date of birth, address), w/ or w/o the use of MADISON principles, with 100% accuracy given max cues, to facilitate increased verbal expression skills, to be achieved in 10-12 weeks.  25: Patient relayed the following information given max cues: name: 67% acc; age and wife's name with 0% acc; daughter's name with 70% accuracy. Wife's and daughter's names added in as new targets to name and age. Did not address  this session.       Visit Tracking:  POC expires Unit limit Auth Expiration date PT/OT + Visit Limit?   25 N/A 2025 18                                           Visit/Unit Tracking  AUTH Status:  Date  3/5  PU  3/10  3/12  3/21  3/24  3/26 3/28  3/31  4/4  PU     Yes Used  7  1  2  3  4  5 6  7  8       Remaining   11  10  9  8  7  6 5  4  3          Intervention " comments:  - 45 minutes speech/language tx

## 2025-04-07 ENCOUNTER — OFFICE VISIT (OUTPATIENT)
Age: 55
End: 2025-04-07
Payer: COMMERCIAL

## 2025-04-07 DIAGNOSIS — I63.50 CEREBROVASCULAR ACCIDENT (CVA) DUE TO STENOSIS OF CEREBRAL ARTERY (HCC): ICD-10-CM

## 2025-04-07 DIAGNOSIS — I69.320 APHASIA AS LATE EFFECT OF CEREBROVASCULAR ACCIDENT (CVA): Primary | ICD-10-CM

## 2025-04-07 PROCEDURE — 92507 TX SP LANG VOICE COMM INDIV: CPT

## 2025-04-07 NOTE — PROGRESS NOTES
Daily Speech Treatment Note    Today's date: 2025   Patient’s name: Severiano Ball  : 1970  MRN: [id number]  Safety measures: Hx of CVA, hyperlipidemia, asthma   Referring provider: Selina Perez,*    Encounter Diagnosis     ICD-10-CM    1. 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 Cerebrovascular accident (CVA) due to stenosis of cerebral artery (HCC)  I63.50            Visit Tracking:  POC expires Unit limit Auth Expiration date PT/OT + Visit Limit?   25 N/A 2025 18                                           Visit/Unit Tracking  AUTH Status:  Date    PU     RE   Yes Used  8  1       Remaining   3  2          Subjective/Behavioral:  -Patient pleasant, engaged, and cooperative. Patient attended today's session unaccompanied. No new concerns reported.       Objective/Assessment:  -Reviewed testing results and goals in plan care with patient. Patient is in agreement at this time.    Short-term goals:  Patient will answer basic/simple Y/N questions with 60% accuracy, given max cues, to facilitate increased auditory comprehension skills, to be achieved in 10-12 weeks.   Patient will follow one-step verbal directions (e.g., close your eyes) with 60% accuracy, given max fading to mod cues, to facilitate increased auditory comprehension skills, to be achieved in 10-12 weeks.   Patient will complete naming of automatic tasks (e.g., counting, listing days of the week/months of the year, etc.) with 100% accuracy given max cues to facilitate increased verbal expression skills, to be achieved in 6-8 weeks.   Patient will relay personal information (e.g. name, date of birth, address), w/ or w/o the use of MADISON principles, with 100% accuracy given max cues, to facilitate increased verbal expression skills, to be achieved in 10-12 weeks  25: Patient relayed the following information given max cues: Name: 100% accuracy with min gesture and orthographic cues!;   and age with 0% accuracy (max cueing); introduced 3 dogs' names (Rhonda, Lagatha, [place]) which patient produced with 65% accuracy (max cues - written, verbal, immediate models). Continue with HEP.      Plan:  -Patient was provided with home exercises/activities to target goals in plan of care at the end of today's session.  -Continue with current plan of care.

## 2025-04-10 ENCOUNTER — APPOINTMENT (OUTPATIENT)
Age: 55
End: 2025-04-10
Payer: COMMERCIAL

## 2025-04-14 ENCOUNTER — OFFICE VISIT (OUTPATIENT)
Age: 55
End: 2025-04-14
Attending: PHYSICIAN ASSISTANT
Payer: COMMERCIAL

## 2025-04-14 DIAGNOSIS — I63.50 CEREBROVASCULAR ACCIDENT (CVA) DUE TO STENOSIS OF CEREBRAL ARTERY (HCC): ICD-10-CM

## 2025-04-14 DIAGNOSIS — I69.320 APHASIA AS LATE EFFECT OF CEREBROVASCULAR ACCIDENT (CVA): Primary | ICD-10-CM

## 2025-04-14 PROCEDURE — 92507 TX SP LANG VOICE COMM INDIV: CPT

## 2025-04-14 NOTE — PROGRESS NOTES
"Daily Speech Treatment Note    Today's date: 2025   Patient’s name: Severiano Ball  : 1970  MRN: [id number]  Safety measures: Hx of CVA, hyperlipidemia, asthma   Referring provider: Selina Perez,*    Encounter Diagnosis     ICD-10-CM    1. 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 Cerebrovascular accident (CVA) due to stenosis of cerebral artery (HCC)  I63.50              Visit Tracking:  POC expires Unit limit Auth Expiration date PT/OT + Visit Limit?   25 N/A 2025 18                                           Visit/Unit Tracking  AUTH Status:  Date    PU    RE   Yes Used  8  1 2      Remaining   3  2 1         Subjective/Behavioral:  -Patient pleasant, engaged, and cooperative. Patient attended today's session unaccompanied. Patient reported, \"I don't feel too good.\"      Objective/Assessment: Patient's wife confirmed he was feeling unwell. He presented with increased neologisms, greater difficulty with repetition, and reduced auditory comprehension during today's session.     Short-term goals:  Patient will answer basic/simple Y/N questions with 60% accuracy, given max cues, to facilitate increased auditory comprehension skills, to be achieved in 10-12 weeks.   Patient will follow one-step verbal directions (e.g., close your eyes) with 60% accuracy, given max fading to mod cues, to facilitate increased auditory comprehension skills, to be achieved in 10-12 weeks.   Patient will complete naming of automatic tasks (e.g., counting, listing days of the week/months of the year, etc.) with 100% accuracy given max cues to facilitate increased verbal expression skills, to be achieved in 6-8 weeks.   25: Patient stated the days of the week with 38% accuracy given min orthographic cues. Accuracy increased to 100% given mod verbal cues. Continue with HEP.   Patient will relay personal information (e.g. name, date of birth, address), w/ or w/o " the use of MADISON principles, with 100% accuracy given max cues, to facilitate increased verbal expression skills, to be achieved in 10-12 weeks  25: Patient relayed the following information given max cues: Name: 100% accuracy with min gesture and orthographic cues!;  and age with 0% accuracy (max cueing); introduced 3 dogs' names (Rhonda, Lagatha, [place]) which patient produced with 65% accuracy (max cues - written, verbal, immediate models). Continue with HEP.  25: Patient relayed his name with 25% accuracy, IND. Sccuracy increased to 100% given min-mod cues including orthographic and verbal prompts. He relayed his age with 33% accuracy given max cues. He relayed his daughter's name with 100% accuracy given max cues; he stated his dogs' names with 50% accuracy given max cues. He stated his  with 0% accuracy given max cues. Backward chaining inconsistently successful with number labeling. Continue with HEP.    Plan:  -Patient was provided with home exercises/activities to target goals in plan of care at the end of today's session.  -Discussed session and patient's progress with caregiver/family member after today's session.  -Continue with current plan of care.

## 2025-04-21 ENCOUNTER — OFFICE VISIT (OUTPATIENT)
Age: 55
End: 2025-04-21
Payer: COMMERCIAL

## 2025-04-21 DIAGNOSIS — I63.50 CEREBROVASCULAR ACCIDENT (CVA) DUE TO STENOSIS OF CEREBRAL ARTERY (HCC): ICD-10-CM

## 2025-04-21 DIAGNOSIS — I69.320 APHASIA AS LATE EFFECT OF CEREBROVASCULAR ACCIDENT (CVA): Primary | ICD-10-CM

## 2025-04-21 PROCEDURE — 92507 TX SP LANG VOICE COMM INDIV: CPT

## 2025-04-21 PROCEDURE — 92523 SPEECH SOUND LANG COMPREHEN: CPT

## 2025-04-21 NOTE — PROGRESS NOTES
Speech-Language Pathology Re-Evaluation    Today's date: 2025   Patient’s name: Severiano Ball  : 1970  MRN: [id number]  Safety measures: Hx of CVA, hyperlipidemia, asthma   Referring provider: Selina Perez,*    Encounter Diagnosis     ICD-10-CM    1. 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 Cerebrovascular accident (CVA) due to stenosis of cerebral artery (HCC)  I63.50             Assessment:   Patient continues to present with mixed severe aphasia c/b significant deficits in both receptive and expressive language, including the ability to identify and name common/functional items, follow simple and complex, multi-step commands, answer Y/N questions, relay personal information, state automatic sequences (e.g. days of the week), significantly reduced auditory comprehension for longer utterances, and significant limitations contributing to conversations 2/2 language processing and verbal expression deficits.     Patient would benefit from continued outpatient skilled Speech Therapy services to support the highest level of independence with his PLOF, rehabilitate expressive/receptive language skills, promote positive communication interactions with both familiar & unfamiliar listeners, patient and caregiver education/training in compensatory strategies, increase communication of wants/needs, utilize expressive/receptive language within functional activities, participate in meaningful activities, allow for increased socialization, promote safety, reduce caregiver burden, and facilitate overall improved quality of life.           Short-term goals:  Patient will answer basic/simple Y/N questions with 60% accuracy, given max cues, to facilitate increased auditory comprehension skills, to be achieved in 10-12 weeks. ONGOING  Patient will follow one-step verbal directions (e.g., close your eyes) with 60% accuracy, given max fading to mod cues, to facilitate increased  auditory comprehension skills, to be achieved in 10-12 weeks. ONGOING  Patient will complete naming of automatic tasks (e.g., counting, listing days of the week/months of the year, etc.) with 100% accuracy given max cues to facilitate increased verbal expression skills, to be achieved in 6-8 weeks. ONGOING  Patient will relay personal information (e.g. name, date of birth, address), w/ or w/o the use of MADISON principles, with 100% accuracy given max cues, to facilitate increased verbal expression skills, to be achieved in 10-12 weeks. ONGOING     Long-term goals:  Patient will demonstrate improved expressive and receptive language skills during structured and unstructured tasks by discharge. ONGOING  Patient will improve ability to facilitate communication to meet needs including use of compensatory strategies to promote meaningful interactions for improved quality of life and maximize level of independence. ONGOING      Plan:  Patient would benefit from outpatient skilled Speech Therapy services: Speech-language therapy    Frequency: 2-4x weekly  Duration: 3 months    Intervention certification from: 4/21/2025  Intervention certification to: 7/21/2025      Subjective:  - Patient engaged and cooperative. Patient attended today's session unaccompanied. Upon arrival in waiting room, patient appeared very angry and agitated, though clinician could not figure out precipitating factor contributing to emotional state. Patient demonstrated a very difficult/hard day with language expression today.     Patient's goal(s): Via patient interview, to be able to understand and speak better.     Pain: Absent (scale:  N/A )      Objective (testing):  The [place] Naming Test-Second Edition (BNT-2) is a confrontational naming assessment that asks patients to provide the best name for a given picture. It was designed to detect word-finding impairments. The BNT-2 consists of 60 pictures, ordered from easiest to most difficult. Stimulus  cues, including semantic, phonemic, and written information, are provided as necessary.      The following results were obtained during the administration of the assessment:     Summary of Scores: Score:   1. Number of spontaneously given correct response: 0       2. Number of stimulus cues given:  15   3. Number of correct responses following a stimulus cue:  0       *TOTAL SCORE: 0   Percentile Rank: Percentile rank not established 2/2 full assessment deferred 2/2 patient frustration and shutting down N/A       Additional Cuein. Number of phonemic cues: 15   5. Number of correct responses following the phonemic cue: 0       6. Number of multiple choices given:  15   7. Number of correct choices: 3             Visit Tracking:  POC expires Unit limit Auth Expiration date PT/OT + Visit Limit?   25 N/A 2025 18   25 N/A  25  18                                 Visit/Unit Tracking  AUTH Status:  Date    PU   RE   Yes Used  8  1 2  3     Remaining   3  2 1  0        Intervention comments:  - 35 minutes speech sound production and language comprehension evaluation  - 10 minutes language tx targeting automatic sequences       spontaneously given correct response: 0       2. Number of stimulus cues given:  15   3. Number of correct responses following a stimulus cue:  0       *TOTAL SCORE: 0   Percentile Rank: Percentile rank not established 2/2 full assessment deferred 2/2 patient frustration and shutting down N/A       Additional Cuein. Number of phonemic cues: 15   5. Number of correct responses following the phonemic cue: 0       6. Number of multiple choices given:  15   7. Number of correct choices: 3             Visit Tracking:  POC expires Unit limit Auth Expiration date PT/OT + Visit Limit?   25 N/A 2025 18   25 N/A  25  18                                 Visit/Unit Tracking  AUTH Status:  Date    PU   RE   Yes Used  8  1 2  3     Remaining   3  2 1  0        Intervention comments:  - 35 minutes speech sound production and language comprehension evaluation  - 10 minutes language tx targeting automatic sequences

## 2025-04-22 ENCOUNTER — TELEPHONE (OUTPATIENT)
Dept: FAMILY MEDICINE CLINIC | Facility: CLINIC | Age: 55
End: 2025-04-22

## 2025-04-22 NOTE — TELEPHONE ENCOUNTER
Received 2nd [facility] Request, successfully faxed to [facility] dept on 4/22/2025, scanned to chart.   Marked as Urgent.    2nd request - 1st request scanned into chartr on 4/7/2025, not documented in Encounter.

## 2025-04-28 ENCOUNTER — OFFICE VISIT (OUTPATIENT)
Age: 55
End: 2025-04-28
Payer: COMMERCIAL

## 2025-04-28 DIAGNOSIS — I69.320 APHASIA AS LATE EFFECT OF CEREBROVASCULAR ACCIDENT (CVA): Primary | ICD-10-CM

## 2025-04-28 DIAGNOSIS — I63.50 CEREBROVASCULAR ACCIDENT (CVA) DUE TO STENOSIS OF CEREBRAL ARTERY (HCC): ICD-10-CM

## 2025-04-28 PROCEDURE — 92507 TX SP LANG VOICE COMM INDIV: CPT

## 2025-04-28 NOTE — PROGRESS NOTES
"Daily Speech Treatment Note    Today's date: 2025   Patient’s name: Severiano Ball  : 1970  MRN: [id number]  Safety measures: Hx of CVA, hyperlipidemia, asthma   Referring provider: Selina Perez,*    Encounter Diagnosis     ICD-10-CM    1. 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 Cerebrovascular accident (CVA) due to stenosis of cerebral artery (HCC)  I63.50           Visit Tracking:  POC expires Unit limit Auth Expiration date PT/OT + Visit Limit?   25 N/A 2025 18   25 N/A  25  18                                 Visit/Unit Tracking  AUTH Status:  Date    PU   RE         Yes Used  8  1 2  3 4          Remaining   3  2 1  0              Subjective/Behavioral:  -Patient pleasant, engaged, and cooperative. Patient attended today's session unaccompanied. No new concerns reported.     Objective/Assessment:  -Reviewed testing results and goals in plan care with patient. Patient is in agreement at this time.    Short-term goals:  Patient will answer basic/simple Y/N questions with 60% accuracy, given max cues, to facilitate increased auditory comprehension skills, to be achieved in 10-12 weeks.   25: Patient answered basic Y/N questions with 40% accuracy given an orthographic and visual (color coding green \"yes\"/red \"no\") cue. Accuracy increased to 60% given mod verbal and visual cues. Accuracy increased to 90% accuracy given a model. Continue with HEP.    Patient will follow one-step verbal directions (e.g., close your eyes) with 60% accuracy, given max fading to mod cues, to facilitate Increased auditory comprehension skills, to be achieved in 10-12 weeks.   Patient will complete naming of automatic tasks (e.g., counting, listing days of the week/months of the year, etc.) with 100% accuracy given max cues to facilitate increased verbal expression skills, to be achieved in 6-8 weeks.   Patient will relay personal " information (e.g. name, date of birth, address), w/ or w/o the use of MADISON principles, with 100% accuracy given max cues, to facilitate increased verbal expression skills, to be achieved in 10-12 weeks.   Patient will receptively identify common/functional items in a field of 3 increasing to 4 pictures, with 80% accuracy given max cues, in 3 out of 4 consecutive sessions, to be achieved in 10-12 weeks.   4/28/25: Patient identified common foods in a field of 3 pictures with 65% accuracy, IND. Accuracy increased to 70% given an orthographic cue. Accuracy increased to 100% given mod visual and verbal cues. Given as HEP.     Plan:  -Patient was provided with home exercises/activities to target goals in plan of care at the end of today's session.  -Discussed session and patient's progress with caregiver/family member after today's session.  -Continue with current plan of care.

## 2025-05-01 ENCOUNTER — APPOINTMENT (OUTPATIENT)
Age: 55
End: 2025-05-01
Payer: COMMERCIAL

## 2025-05-06 ENCOUNTER — OFFICE VISIT (OUTPATIENT)
Age: 55
End: 2025-05-06
Attending: PHYSICIAN ASSISTANT
Payer: COMMERCIAL

## 2025-05-06 DIAGNOSIS — I69.320 APHASIA AS LATE EFFECT OF CEREBROVASCULAR ACCIDENT (CVA): Primary | ICD-10-CM

## 2025-05-06 DIAGNOSIS — I63.50 CEREBROVASCULAR ACCIDENT (CVA) DUE TO STENOSIS OF CEREBRAL ARTERY (HCC): ICD-10-CM

## 2025-05-06 PROCEDURE — 92507 TX SP LANG VOICE COMM INDIV: CPT

## 2025-05-06 NOTE — PROGRESS NOTES
"Daily Speech Treatment Note    Today's date: 2025   Patient’s name: Severiano Ball  : 1970  MRN: [id number]  Safety measures: Hx of CVA, hyperlipidemia, asthma   Referring provider: Selina Perez,*    Encounter Diagnosis     ICD-10-CM    1. 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 Cerebrovascular accident (CVA) due to stenosis of cerebral artery (HCC)  I63.50             Visit Tracking:  POC expires Unit limit Auth Expiration date PT/OT + Visit Limit?   25 N/A 2025 18   25 N/A  25  18                                 Visit/Unit Tracking  AUTH Status:  Date    PU   RE        Yes Used  8  1 2  3 4 5         Remaining   3  2 1  16 15 14            Subjective/Behavioral:  -Patient pleasant, engaged, and cooperative. Patient attended today's session unaccompanied. No new concerns reported.     Objective/Assessment: Attempted to fade visual/orthographic cues for stating days of the week; however, patient unable to correct errors without orthographic assistance. Phonemic cues unsuccessful for prompting patient to self-correct.       Short-term goals:  Patient will answer basic/simple Y/N questions with 60% accuracy, given max cues, to facilitate increased auditory comprehension skills, to be achieved in 10-12 weeks.   25: Patient answered basic Y/N questions with 40% accuracy given an orthographic and visual (color coding green \"yes\"/red \"no\") cue. Accuracy increased to 60% given mod verbal and visual cues. Accuracy increased to 90% accuracy given a model. Continue with HEP.    Patient will follow one-step verbal directions (e.g., close your eyes) with 60% accuracy, given max fading to mod cues, to facilitate Increased auditory comprehension skills, to be achieved in 10-12 weeks.   25: Patient followed basic one-step directions with 75% accuracy given max cues including errorless learning (immediate models), " orthographic cues, and hand-over-hand physical assist. Given as HEP.    Patient will complete naming of automatic tasks (e.g., counting, listing days of the week/months of the year, etc.) with 100% accuracy given max cues to facilitate increased verbal expression skills, to be achieved in 6-8 weeks.   5/6/25: Patient stated the days of the week with 60% accuracy given min orthographic cues. Accuracy increased to 100% given mod verbal cues. Patient stated the months of the year with 80% accuracy given max cues including models, orthographic cues, placement cues, and immediate, direct verbal feedback. Continue with HEP.    Patient will relay personal information (e.g. name, date of birth, address), w/ or w/o the use of MADISON principles, with 100% accuracy given max cues, to facilitate increased verbal expression skills, to be achieved in 10-12 weeks.   Patient will receptively identify common/functional items in a field of 3 increasing to 4 pictures, with 80% accuracy given max cues, in 3 out of 4 consecutive sessions, to be achieved in 10-12 weeks.   4/28/25: Patient identified common foods in a field of 3 pictures with 65% accuracy, IND. Accuracy increased to 70% given an orthographic cue. Accuracy increased to 100% given mod visual and verbal cues. Given as HEP.     Plan:  -Patient was provided with home exercises/activities to target goals in plan of care at the end of today's session.  -Discussed session and patient's progress with caregiver/family member after today's session.  -Continue with current plan of care.

## 2025-05-07 ENCOUNTER — OFFICE VISIT (OUTPATIENT)
Age: 55
End: 2025-05-07
Attending: PHYSICIAN ASSISTANT
Payer: COMMERCIAL

## 2025-05-07 DIAGNOSIS — I63.50 CEREBROVASCULAR ACCIDENT (CVA) DUE TO STENOSIS OF CEREBRAL ARTERY (HCC): ICD-10-CM

## 2025-05-07 DIAGNOSIS — I69.320 APHASIA AS LATE EFFECT OF CEREBROVASCULAR ACCIDENT (CVA): Primary | ICD-10-CM

## 2025-05-07 PROCEDURE — 92507 TX SP LANG VOICE COMM INDIV: CPT

## 2025-05-07 NOTE — PROGRESS NOTES
"Daily Speech Treatment Note    Today's date: 2025   Patient’s name: Severiano Ball  : 1970  MRN: [id number]  Safety measures: Hx of CVA, hyperlipidemia, asthma   Referring provider: Selina Perez,*    Encounter Diagnosis     ICD-10-CM    1. 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 Cerebrovascular accident (CVA) due to stenosis of cerebral artery (HCC)  I63.50           Visit Tracking:  POC expires Unit limit Auth Expiration date PT/OT + Visit Limit?   25 N/A 2025 18   25 N/A  25  18                                 Visit/Unit Tracking  AUTH Status:  Date    PU   RE       Yes Used  8  1 2  3 4 5 6        Remaining   3  2 1  16 15 14 13           Subjective/Behavioral:  -Patient pleasant, engaged, and cooperative. Patient attended today's session unaccompanied. No new concerns reported.     Objective/Assessment: Patient benefited from structured break 2/2 increased frustrations d/t incorrect productions. Increased paraphasias and neologisms, with reduced ability for repetition noted with increased emotional response. Patient demonstrating improved reading at basic 1-word level.     Short-term goals:  Patient will answer basic/simple Y/N questions with 60% accuracy, given max cues, to facilitate increased auditory comprehension skills, to be achieved in 10-12 weeks.   25: Patient answered basic Y/N questions with 40% accuracy given an orthographic and visual (color coding green \"yes\"/red \"no\") cue. Accuracy increased to 60% given mod verbal and visual cues. Accuracy increased to 90% accuracy given a model. Continue with HEP.    Patient will follow one-step verbal directions (e.g., close your eyes) with 60% accuracy, given max fading to mod cues, to facilitate Increased auditory comprehension skills, to be achieved in 10-12 weeks.   25: Patient followed basic one-step directions with 75% accuracy given max cues " including errorless learning (immediate models), orthographic cues, and hand-over-hand physical assist. Given as HEP.    Patient will complete naming of automatic tasks (e.g., counting, listing days of the week/months of the year, etc.) with 100% accuracy given max cues to facilitate increased verbal expression skills, to be achieved in 6-8 weeks.   25: Patient stated the days of the week with 60% accuracy given min orthographic cues. Accuracy increased to 100% given mod verbal cues. Patient stated the months of the year with 80% accuracy given max cues including models, orthographic cues, placement cues, and immediate, direct verbal feedback. Continue with HEP.    Patient will relay personal information (e.g. name, date of birth, address), w/ or w/o the use of MADISON principles, with 100% accuracy given max cues, to facilitate increased verbal expression skills, to be achieved in 10-12 weeks.   25: Patient relayed his name with 75% accuracy given a min orthographic cue. He relayed his age with 50% accuracy given a min orthographic cue; however, accuracy remained the same given max cues including visual/written, gesture, verbal, and clinician models. Patient relayed his  with 25% accuracy given max cues. Continue with HEP.     Patient will receptively identify common/functional items in a field of 3 increasing to 4 pictures, with 80% accuracy given max cues, in 3 out of 4 consecutive sessions, to be achieved in 10-12 weeks.   25: Patient identified common foods in a field of 3 pictures with 65% accuracy, IND. Accuracy increased to 70% given an orthographic cue. Accuracy increased to 100% given mod visual and verbal cues. Given as HEP.     Plan:  -Patient was provided with home exercises/activities to target goals in plan of care at the end of today's session.  -Discussed session and patient's progress with caregiver/family member after today's session.  -Continue with current plan of care.

## 2025-05-09 DIAGNOSIS — I63.50 CEREBROVASCULAR ACCIDENT (CVA) DUE TO STENOSIS OF CEREBRAL ARTERY (HCC): ICD-10-CM

## 2025-05-09 RX ORDER — ATORVASTATIN CALCIUM 80 MG/1
80 TABLET, FILM COATED ORAL DAILY
Qty: 90 TABLET | Refills: 1 | Status: SHIPPED | OUTPATIENT
Start: 2025-05-09

## 2025-05-14 ENCOUNTER — OFFICE VISIT (OUTPATIENT)
Age: 55
End: 2025-05-14
Attending: PHYSICIAN ASSISTANT
Payer: COMMERCIAL

## 2025-05-14 DIAGNOSIS — I63.50 CEREBROVASCULAR ACCIDENT (CVA) DUE TO STENOSIS OF CEREBRAL ARTERY (HCC): ICD-10-CM

## 2025-05-14 DIAGNOSIS — I69.320 APHASIA AS LATE EFFECT OF CEREBROVASCULAR ACCIDENT (CVA): Primary | ICD-10-CM

## 2025-05-14 PROCEDURE — 92507 TX SP LANG VOICE COMM INDIV: CPT

## 2025-05-14 NOTE — PROGRESS NOTES
"Daily Speech Treatment Note    Today's date: 2025   Patient’s name: Severiano Ball  : 1970  MRN: [id number]  Safety measures: Hx of CVA, hyperlipidemia, asthma   Referring provider: Selina Perez,*    Encounter Diagnosis     ICD-10-CM    1. 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 Cerebrovascular accident (CVA) due to stenosis of cerebral artery (HCC)  I63.50             Visit Tracking:  POC expires Unit limit Auth Expiration date PT/OT + Visit Limit?   25 N/A 2025 18   25 N/A  25  18                                 Visit/Unit Tracking  AUTH Status:  Date    PU   RE     PU   Yes Used  8  1 2  3 4 5 6 7       Remaining   3  2 1  16 15 14 13 12          Subjective/Behavioral:  -Patient pleasant, engaged, and cooperative. Patient attended today's session unaccompanied. No new concerns reported.     Objective/Assessment: Patient continued to benefit from structured break 2/2 increased frustrations d/t incorrect productions, particularly with months of the year and alphabet. Patient observed with correct spontaneous confrontational naming for receptive identification tasks.     Short-term goals:  Patient will answer basic/simple Y/N questions with 60% accuracy, given max cues, to facilitate increased auditory comprehension skills, to be achieved in 10-12 weeks.   25: Patient answered basic Y/N questions with 40% accuracy given an orthographic and visual (color coding green \"yes\"/red \"no\") cue. Accuracy increased to 60% given mod verbal and visual cues. Accuracy increased to 90% accuracy given a model. Continue with HEP.    Patient will follow one-step verbal directions (e.g., close your eyes) with 60% accuracy, given max fading to mod cues, to facilitate Increased auditory comprehension skills, to be achieved in 10-12 weeks.   25: Patient followed basic one-step directions with 75% accuracy given max cues " including errorless learning (immediate models), orthographic cues, and hand-over-hand physical assist. Given as HEP.    Patient will complete naming of automatic tasks (e.g., counting, listing days of the week/months of the year, etc.) with 100% accuracy given max cues to facilitate increased verbal expression skills, to be achieved in 6-8 weeks.   25: Patient stated the days of the week with 60% accuracy given min orthographic cues. Accuracy increased to 100% given mod verbal cues. Patient stated the months of the year with 80% accuracy given max cues including models, orthographic cues, placement cues, and immediate, direct verbal feedback. Continue with HEP.  25: Patient stated the days of the week with 60% accuracy given orthographic and gesture cues; accuracy increased to 100% given mod verbal and visual prompts. Patient stated the months of the year with 33% accuracy given max cues including models. Patient attempted to state alphabet following clinician models; however, deferred 2/2 difficulty level and patient frustration.    Patient will relay personal information (e.g. name, date of birth, address), w/ or w/o the use of MADISON principles, with 100% accuracy given max cues, to facilitate increased verbal expression skills, to be achieved in 10-12 weeks.   25: Patient relayed his name with 75% accuracy given a min orthographic cue. He relayed his age with 50% accuracy given a min orthographic cue; however, accuracy remained the same given max cues including visual/written, gesture, verbal, and clinician models. Patient relayed his  with 25% accuracy given max cues. Continue with HEP.     Patient will receptively identify common/functional items in a field of 3 increasing to 4 pictures, with 80% accuracy given max cues, in 3 out of 4 consecutive sessions, to be achieved in 10-12 weeks.   25: Patient identified common foods in a field of 3 pictures with 65% accuracy, IND. Accuracy  increased to 70% given an orthographic cue. Accuracy increased to 100% given mod visual and verbal cues. Given as HEP.   25: Given a field of 3, patient IND identified items in the following categories with the following accuracy: foods: 80% acc, IND; clothin% acc, IND; household items: 69% accuracy, IND. Accuracy increased to 100% given mod cues including reduction of visual field, semantic cues, orthographic cues, and immediate, direct verbal feedback. Continue with HEP.       Plan:  -Patient was provided with home exercises/activities to target goals in plan of care at the end of today's session.  -Continue with current plan of care.

## 2025-05-15 ENCOUNTER — OFFICE VISIT (OUTPATIENT)
Age: 55
End: 2025-05-15
Attending: PHYSICIAN ASSISTANT
Payer: COMMERCIAL

## 2025-05-15 DIAGNOSIS — I69.320 APHASIA AS LATE EFFECT OF CEREBROVASCULAR ACCIDENT (CVA): Primary | ICD-10-CM

## 2025-05-15 DIAGNOSIS — I63.50 CEREBROVASCULAR ACCIDENT (CVA) DUE TO STENOSIS OF CEREBRAL ARTERY (HCC): ICD-10-CM

## 2025-05-15 PROCEDURE — 92507 TX SP LANG VOICE COMM INDIV: CPT

## 2025-05-15 NOTE — PROGRESS NOTES
"Daily Speech Treatment Note    Today's date: 5/15/2025   Patient’s name: Severiano Ball  : 1970  MRN: [id number]  Safety measures: Hx of CVA, hyperlipidemia, asthma   Referring provider: Selina Perez,*    Encounter Diagnosis     ICD-10-CM    1. 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 Cerebrovascular accident (CVA) due to stenosis of cerebral artery (HCC)  I63.50               Visit Tracking:  POC expires Unit limit Auth Expiration date PT/OT + Visit Limit?   25 N/A 2025 18   25 N/A  25  18                                 Visit/Unit Tracking  AUTH Status:  Date    PU   RE 4/28 5/6 5/7 5/14 5/15   PU   Yes Used  8  1 2  3 4 5 6 7 8      Remaining   3  2 1  16 15 14 13 12 11         Subjective/Behavioral:  -Patient pleasant, engaged, and cooperative. Patient attended today's session unaccompanied. Patient's wife reported patient will be canceling next week's sessions 2/2 [place].    Objective/Assessment: Patient presented with significant increased difficulty with comprehension of y/n questions;,type of questions, and the fact that his answer will have to vary depending on type of question asked. Patient reported \"I like doing this one\" when referring to orthographic cue for \"yes\".    Short-term goals:  Patient will answer basic/simple Y/N questions with 60% accuracy, given max cues, to facilitate increased auditory comprehension skills, to be achieved in 10-12 weeks.   25: Patient answered basic Y/N questions with 40% accuracy given an orthographic and visual (color coding green \"yes\"/red \"no\") cue. Accuracy increased to 60% given mod verbal and visual cues. Accuracy increased to 90% accuracy given a model. Continue with HEP.  5/15/25: Patient answered personal basic Y/N questions (e.g. \"Is your name Severiano?\" \"Are you a woman?\") with 60% accuracy given max cues including orthographic, visual (color coded green = " "yes, red = no), gesture, semantic, and clinician models. Continue with HEP.     Patient will follow one-step verbal directions (e.g., close your eyes) with 60% accuracy, given max fading to mod cues, to facilitate Increased auditory comprehension skills, to be achieved in 10-12 weeks.   5/6/25: Patient followed basic one-step directions with 75% accuracy given max cues including errorless learning (immediate models), orthographic cues, and hand-over-hand physical assist. Given as HEP.  5/15/25: Patient followed basic one-step directions related to body parts with 10% accuracy, IND; increasing to 90% accuracy given max cues including clinician models and hand-over-hand physical assist as necessary. Patient provided with direct instruction on discrimination between \"man/woman\", \"boy/girl\", and \"daughter/son\". Continue with HEP.     Patient will complete naming of automatic tasks (e.g., counting, listing days of the week/months of the year, etc.) with 100% accuracy given max cues to facilitate increased verbal expression skills, to be achieved in 6-8 weeks.   5/6/25: Patient stated the days of the week with 60% accuracy given min orthographic cues. Accuracy increased to 100% given mod verbal cues. Patient stated the months of the year with 80% accuracy given max cues including models, orthographic cues, placement cues, and immediate, direct verbal feedback. Continue with HEP.  5/14/25: Patient stated the days of the week with 60% accuracy given orthographic and gesture cues; accuracy increased to 100% given mod verbal and visual prompts. Patient stated the months of the year with 33% accuracy given max cues including models. Patient attempted to state alphabet following clinician models; however, deferred 2/2 difficulty level and patient frustration.    Patient will relay personal information (e.g. name, date of birth, address), w/ or w/o the use of MADISON principles, with 100% accuracy given max cues, to facilitate " increased verbal expression skills, to be achieved in 10-12 weeks.   25: Patient relayed his name with 75% accuracy given a min orthographic cue. He relayed his age with 50% accuracy given a min orthographic cue; however, accuracy remained the same given max cues including visual/written, gesture, verbal, and clinician models. Patient relayed his  with 25% accuracy given max cues. Continue with HEP.     Patient will receptively identify common/functional items in a field of 3 increasing to 4 pictures, with 80% accuracy given max cues, in 3 out of 4 consecutive sessions, to be achieved in 10-12 weeks.   25: Patient identified common foods in a field of 3 pictures with 65% accuracy, IND. Accuracy increased to 70% given an orthographic cue. Accuracy increased to 100% given mod visual and verbal cues. Given as HEP.   25: Given a field of 3, patient IND identified items in the following categories with the following accuracy: foods: 80% acc, IND; clothin% acc, IND; household items: 69% accuracy, IND. Accuracy increased to 100% given mod cues including reduction of visual field, semantic cues, orthographic cues, and immediate, direct verbal feedback. Continue with HEP.       Plan:  -Patient was provided with home exercises/activities to target goals in plan of care at the end of today's session.  -Discussed session and patient's progress with caregiver/family member after today's session.  -Continue with current plan of care.

## 2025-05-21 ENCOUNTER — APPOINTMENT (OUTPATIENT)
Age: 55
End: 2025-05-21
Attending: PHYSICIAN ASSISTANT
Payer: COMMERCIAL

## 2025-05-23 ENCOUNTER — APPOINTMENT (OUTPATIENT)
Age: 55
End: 2025-05-23
Attending: PHYSICIAN ASSISTANT
Payer: COMMERCIAL

## 2025-05-27 ENCOUNTER — EVALUATION (OUTPATIENT)
Age: 55
End: 2025-05-27
Attending: PHYSICIAN ASSISTANT
Payer: COMMERCIAL

## 2025-05-27 DIAGNOSIS — I69.320 APHASIA AS LATE EFFECT OF CEREBROVASCULAR ACCIDENT (CVA): Primary | ICD-10-CM

## 2025-05-27 DIAGNOSIS — I63.50 CEREBROVASCULAR ACCIDENT (CVA) DUE TO STENOSIS OF CEREBRAL ARTERY (HCC): ICD-10-CM

## 2025-05-27 PROCEDURE — 92507 TX SP LANG VOICE COMM INDIV: CPT

## 2025-05-27 NOTE — LETTER
May 30, 2025    No Recipients    Patient: Severiano Ball   YOB: 1970   Date of Visit: 2025     Encounter Diagnosis     ICD-10-CM    1. 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 Cerebrovascular accident (CVA) due to stenosis of cerebral artery (HCC)  I63.50           Dear Dr. Vasquez Recipients:    Thank you for your recent referral of Severiano Ball. Please review the attached evaluation summary from Severiano's recent visit.     Please verify that you agree with the plan of care by signing the attached order.     If you have any questions or concerns, please do not hesitate to call.     I sincerely appreciate the opportunity to share in the care of one of your patients and hope to have another opportunity to work with you in the near future.     Sincerely,    Mirella Jacob, SLP      Referring Provider:     Based upon review of the patient's progress and continued therapy plan, it is my medical opinion that Severiano Ball should continue speech therapy treatment at the Physical Therapy at [facility]:                    No Recipients        Speech-Language Pathology Re-Evaluation    Today's date: 2025   Patient’s name: Severiano Ball  : 1970  MRN: [id number]  Safety measures: Hx of CVA, hyperlipidemia, asthma   Referring provider: Selina Perez,*    Encounter Diagnosis     ICD-10-CM    1. 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 Cerebrovascular accident (CVA) due to stenosis of cerebral artery (HCC)  I63.50           Assessment:   Patient continues to present with mixed severe aphasia c/b significant deficits in both receptive and expressive language, including the ability to identify and name common/functional items, follow simple and complex, multi-step commands, answer Y/N questions, relay personal information, state automatic sequences (e.g. days of the week), significantly reduced auditory comprehension  "for longer utterances, and significant limitations contributing to conversations 2/2 language processing and verbal expression deficits. Per patient's wife report, patient has been IND using heaving machinery/heavy equipment on home property to complete outside projects; he has recently begun cutting grass and trimming bushes/hedges using an electric saw; IND replaced a motion sensor/detection light (with rewiring involved); has reinitiated home tasks (e.g. building a bed) IND, and demonstrates improved recall with completion of these tasks and following all necessary steps. Patient and wife were educated re: maintaining safety specifically 2/2 patient's inability to communicate medical information or narrative of events in an emergency. Patient's wife reported she closely supervises him in these activities; however, emphasis was placed on using safety protection devices to reduce the risk of harm. Patient's wife reported, \"we can't understand the 'why's' behind certain decisions and tasks\", to which education was provided on correlation to speech/language deficits. In structured ST sessions, patient has recently demonstrated an improvement in self-monitoring incorrect productions with increased ability to self-correct, or at least attempts to self-correct have also increased. Patient becomes significantly frustrated 2/2 communication impairments and benefits from structured breaks with implementation of diaphragmatic breathing exercises to reduce emotional lability (anger/frustration). Patient continues to demonstrate an increase in correct spontaneous utterances as well over the course of POC. Patient reportedly completing more consistent HEP since last progress update. Patient would benefit from continued outpatient skilled Speech Therapy services to support the highest level of independence with his PLOF, rehabilitate expressive/receptive language skills, promote positive communication interactions with both " familiar & unfamiliar listeners, patient and caregiver education/training in compensatory strategies, increase communication of wants/needs, utilize expressive/receptive language within functional activities, participate in meaningful activities, allow for increased socialization, promote safety, reduce caregiver burden, and facilitate overall improved quality of life.        Short-term goals:  Patient will answer basic/simple Y/N questions with 60% accuracy, given max cues, to facilitate increased auditory comprehension skills, to be achieved in 10-12 weeks. GOAL MET AT MAX CUEING LEVEL - MODIFIED TO 80% ACCURACY GIVEN MOD FADING TO MIN CUES  Patient will follow one-step verbal directions (e.g., close your eyes) with 60% accuracy, given max fading to mod cues, to facilitate increased auditory comprehension skills, to be achieved in 10-12 weeks. ONGOING - MODIFIED GOAL TO 80% ACCURACY GIVEN MAX FADING TO MOD CUES  Patient will complete naming of automatic tasks (e.g., counting, listing days of the week/months of the year, etc.) with 100% accuracy given max cues to facilitate increased verbal expression skills, to be achieved in 6-8 weeks. ONGOING - MODIFIED GOAL TO 80% ACCURACY GIVEN MAX FADING TO MOD CUES AND EXTENDING TO 10-12 WEEKS  Patient will relay personal information (e.g. name, date of birth, address), w/ or w/o the use of MADISON principles, with 100% accuracy given max cues, to facilitate increased verbal expression skills, to be achieved in 10-12 weeks. ONGOING - MODIFIED GOAL TO 80% ACCURACY GIVEN MAX FADING TO MOD CUES  Patient will receptively identify common/functional items in a field of 3 increasing to 4 pictures, with 80% accuracy given max cues, in 3 out of 4 consecutive sessions, to be achieved in 10-12 weeks. ONGOING     Long-term goals:  Patient will demonstrate improved expressive and receptive language skills during structured and unstructured tasks by discharge. ONGOING  Patient will improve  "ability to facilitate communication to meet needs including use of compensatory strategies to promote meaningful interactions for improved quality of life and maximize level of independence. ONGOING      Plan:  Patient would benefit from outpatient skilled Speech Therapy services: Speech-language therapy    Frequency: 2-4x weekly  Duration: 2 months    Intervention certification from: 5/27/2025  Intervention certification to: 7/21/2025      Subjective:  -Patient pleasant, engaged, and cooperative. Patient attended today's session unaccompanied. No new concerns reported.     Patient's goal(s): Via patient interview and multimodal cueing - \"to talk and understand better\".     Pain: Absent (scale: N/A)      Objective (testing):  No formal testing administered as today's session serves as a progress update.         Treatment:  Patient will answer basic/simple Y/N questions with 60% accuracy, given max cues, to facilitate increased auditory comprehension skills, to be achieved in 10-12 weeks.   5/27/25: Patient answered basic/personal Y/N questions with 70% accuracy given min visual/orthographic cues. Accuracy increased to 100% given visual  and verbal prompts including verbal shaping of answer, fixed choice of two, and semantic cues. Continue with HEP.    Patient will complete naming of automatic tasks (e.g., counting, listing days of the week/months of the year, etc.) with 100% accuracy given max cues to facilitate increased verbal expression skills, to be achieved in 6-8 weeks.   5/27/25: Patient stated the days of the week with 80% accuracy given min orthographic and gesture cues. Clinician then faded orthographic cues, and patient produced days of the week with 60% given min gesture and orthographic cues as needed in a least-to-most prompt hierarchy approach. Patient then stated the months of the year with 67% accuracy given mod-max cues. Continue with HEP.         Visit Tracking:  POC expires Unit limit Auth " Expiration date PT/OT + Visit Limit?   5/5/25 N/A 6/5/2025 18   7/21/25 N/A  6/5/25 18                                 Visit/Unit Tracking  AUTH Status:  Date  4/4  PU 4/7 4/14 4/21  RE 4/28 5/6 5/7 5/14 5/15 5/27  PU   Yes Used  8  1 2  3 4 5 6 7 8 9     Remaining   3  2 1  16 15 14 13 12 11 10        Intervention comments:  - 45 minutes speech/language tx targeting Y/N comprehension and verbalization of automatic sequences

## 2025-05-27 NOTE — PROGRESS NOTES
"Speech-Language Pathology Re-Evaluation    Today's date: 2025   Patient’s name: Severiano Ball  : 1970  MRN: [id number]  Safety measures: Hx of CVA, hyperlipidemia, asthma   Referring provider: Selina Perez,*    Encounter Diagnosis     ICD-10-CM    1. 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 Cerebrovascular accident (CVA) due to stenosis of cerebral artery (HCC)  I63.50           Assessment:   Patient continues to present with mixed severe aphasia c/b significant deficits in both receptive and expressive language, including the ability to identify and name common/functional items, follow simple and complex, multi-step commands, answer Y/N questions, relay personal information, state automatic sequences (e.g. days of the week), significantly reduced auditory comprehension for longer utterances, and significant limitations contributing to conversations 2/2 language processing and verbal expression deficits. Per patient's wife report, patient has been IND using heaving machinery/heavy equipment on home property to complete outside projects; he has recently begun cutting grass and trimming bushes/hedges using an electric saw; IND replaced a motion sensor/detection light (with rewiring involved); has reinitiated home tasks (e.g. building a bed) IND, and demonstrates improved recall with completion of these tasks and following all necessary steps. Patient and wife were educated re: maintaining safety specifically 2/2 patient's inability to communicate medical information or narrative of events in an emergency. Patient's wife reported she closely supervises him in these activities; however, emphasis was placed on using safety protection devices to reduce the risk of harm. Patient's wife reported, \"we can't understand the 'why's' behind certain decisions and tasks\", to which education was provided on correlation to speech/language deficits. In structured ST sessions, " patient has recently demonstrated an improvement in self-monitoring incorrect productions with increased ability to self-correct, or at least attempts to self-correct have also increased. Patient becomes significantly frustrated 2/2 communication impairments and benefits from structured breaks with implementation of diaphragmatic breathing exercises to reduce emotional lability (anger/frustration). Patient continues to demonstrate an increase in correct spontaneous utterances as well over the course of POC. Patient reportedly completing more consistent HEP since last progress update. Patient would benefit from continued outpatient skilled Speech Therapy services to support the highest level of independence with his PLOF, rehabilitate expressive/receptive language skills, promote positive communication interactions with both familiar & unfamiliar listeners, patient and caregiver education/training in compensatory strategies, increase communication of wants/needs, utilize expressive/receptive language within functional activities, participate in meaningful activities, allow for increased socialization, promote safety, reduce caregiver burden, and facilitate overall improved quality of life.        Short-term goals:  Patient will answer basic/simple Y/N questions with 60% accuracy, given max cues, to facilitate increased auditory comprehension skills, to be achieved in 10-12 weeks. GOAL MET AT MAX CUEING LEVEL - MODIFIED TO 80% ACCURACY GIVEN MOD FADING TO MIN CUES  Patient will follow one-step verbal directions (e.g., close your eyes) with 60% accuracy, given max fading to mod cues, to facilitate increased auditory comprehension skills, to be achieved in 10-12 weeks. ONGOING - MODIFIED GOAL TO 80% ACCURACY GIVEN MAX FADING TO MOD CUES  Patient will complete naming of automatic tasks (e.g., counting, listing days of the week/months of the year, etc.) with 100% accuracy given max cues to facilitate increased verbal  "expression skills, to be achieved in 6-8 weeks. ONGOING - MODIFIED GOAL TO 80% ACCURACY GIVEN MAX FADING TO MOD CUES AND EXTENDING TO 10-12 WEEKS  Patient will relay personal information (e.g. name, date of birth, address), w/ or w/o the use of MADISON principles, with 100% accuracy given max cues, to facilitate increased verbal expression skills, to be achieved in 10-12 weeks. ONGOING - MODIFIED GOAL TO 80% ACCURACY GIVEN MAX FADING TO MOD CUES  Patient will receptively identify common/functional items in a field of 3 increasing to 4 pictures, with 80% accuracy given max cues, in 3 out of 4 consecutive sessions, to be achieved in 10-12 weeks. ONGOING     Long-term goals:  Patient will demonstrate improved expressive and receptive language skills during structured and unstructured tasks by discharge. ONGOING  Patient will improve ability to facilitate communication to meet needs including use of compensatory strategies to promote meaningful interactions for improved quality of life and maximize level of independence. ONGOING      Plan:  Patient would benefit from outpatient skilled Speech Therapy services: Speech-language therapy    Frequency: 2-4x weekly  Duration: 2 months    Intervention certification from: 5/27/2025  Intervention certification to: 7/21/2025      Subjective:  -Patient pleasant, engaged, and cooperative. Patient attended today's session unaccompanied. No new concerns reported.     Patient's goal(s): Via patient interview and multimodal cueing - \"to talk and understand better\".     Pain: Absent (scale: N/A)      Objective (testing):  No formal testing administered as today's session serves as a progress update.         Treatment:  Patient will answer basic/simple Y/N questions with 60% accuracy, given max cues, to facilitate increased auditory comprehension skills, to be achieved in 10-12 weeks.   5/27/25: Patient answered basic/personal Y/N questions with 70% accuracy given min visual/orthographic " cues. Accuracy increased to 100% given visual  and verbal prompts including verbal shaping of answer, fixed choice of two, and semantic cues. Continue with HEP.    Patient will complete naming of automatic tasks (e.g., counting, listing days of the week/months of the year, etc.) with 100% accuracy given max cues to facilitate increased verbal expression skills, to be achieved in 6-8 weeks.   5/27/25: Patient stated the days of the week with 80% accuracy given min orthographic and gesture cues. Clinician then faded orthographic cues, and patient produced days of the week with 60% given min gesture and orthographic cues as needed in a least-to-most prompt hierarchy approach. Patient then stated the months of the year with 67% accuracy given mod-max cues. Continue with HEP.         Visit Tracking:  POC expires Unit limit Auth Expiration date PT/OT + Visit Limit?   5/5/25 N/A 6/5/2025 18   7/21/25 N/A  6/5/25  18                                 Visit/Unit Tracking  AUTH Status:  Date  4/4  PU 4/7 4/14 4/21  RE 4/28 5/6 5/7 5/14 5/15 5/27  PU   Yes Used  8  1 2  3 4 5 6 7 8 9     Remaining   3  2 1  16 15 14 13 12 11 10        Intervention comments:  - 45 minutes speech/language tx targeting Y/N comprehension and verbalization of automatic sequences

## 2025-05-30 ENCOUNTER — OFFICE VISIT (OUTPATIENT)
Age: 55
End: 2025-05-30
Attending: PHYSICIAN ASSISTANT
Payer: COMMERCIAL

## 2025-05-30 DIAGNOSIS — I63.50 CEREBROVASCULAR ACCIDENT (CVA) DUE TO STENOSIS OF CEREBRAL ARTERY (HCC): ICD-10-CM

## 2025-05-30 DIAGNOSIS — I69.320 APHASIA AS LATE EFFECT OF CEREBROVASCULAR ACCIDENT (CVA): Primary | ICD-10-CM

## 2025-05-30 PROCEDURE — 92507 TX SP LANG VOICE COMM INDIV: CPT

## 2025-05-30 NOTE — PROGRESS NOTES
Daily Speech Treatment Note    Today's date: 2025   Patient’s name: Severiano Ball  : 1970  MRN: [id number]  Safety measures: Hx of CVA, hyperlipidemia, asthma   Referring provider: Selina Perez,*    Encounter Diagnosis     ICD-10-CM    1. 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 Cerebrovascular accident (CVA) due to stenosis of cerebral artery (HCC)  I63.50           Visit Tracking:  POC expires Unit limit Auth Expiration date PT/OT + Visit Limit?   25 N/A 2025 18   25 N/A  25  18                                 Visit/Unit Tracking  AUTH Status:  Date    RE 4/28 5/6 5/7 5/14 5/15 5/27  PU    Yes Used  3 4 5 6 7 8 9 1     Remaining   16 15 14 13 12 11 10 9        Subjective/Behavioral:  -Patient pleasant, engaged, and cooperative. Patient attended today's session unaccompanied. No new concerns reported.       Objective/Assessment:  -Reviewed testing results and goals in plan care with patient. Patient is in agreement at this time. Patient's daughter called on video during first few minutes of tx session; clinician observed phone interaction between patient and daughter and assisted with comprehension and expressive language as needed to navigate the many communication breakdowns noted. Patient's daughter was educated on aphasia cueing techniques (asking Y/N questions, providing 2 fixed choices, checking for understanding, etc.) as well as multi-modal communication including written cues (4-5 words max) and gestures to supplement verbal expression to facilitate improved receptive comprehension of information conveyed in spontaneous conversations. Patient's daughter then raised a topic of concern which triggered patient into a more emotionally labile state (increased anger and frustration) which prompted patient to end the phone conversation. Patient then called his father, and the same clinical support, explanation, and education was  provided to the patient's father. Discussed events of session with spouse, Kathy, following ST. STGs not formally addressed this session as a result of the aforementioned.     Short-term goals:  Patient will answer basic/simple personal Y/N questions with 80% accuracy, given mod fading to min cues, to facilitate increased auditory comprehension skills, to be achieved in 10-12 weeks.   Patient will follow one-step verbal directions (e.g., close your eyes) with 80% accuracy, given max fading to mod cues, to facilitate increased auditory comprehension skills, to be achieved in 10-12 weeks.   Patient will complete naming of automatic tasks (e.g., counting, listing days of the week/months of the year, etc.) with 80% accuracy given max fading to mod cues to facilitate increased verbal expression skills, to be achieved in 10-12 weeks.   Patient will relay personal information (e.g. name, date of birth, address), w/ or w/o the use of MADISON principles, with 80% accuracy given max fading to mod cues, to facilitate increased verbal expression skills, to be achieved in 10-12 weeks.   Patient will receptively identify common/functional items in a field of 3 increasing to 4 pictures, with 80% accuracy given max cues, in 3 out of 4 consecutive sessions, to be achieved in 10-12 weeks.    Plan:  -Discussed session and patient's progress with caregiver/family member after today's session.  -Continue with current plan of care.

## 2025-06-04 ENCOUNTER — OFFICE VISIT (OUTPATIENT)
Age: 55
End: 2025-06-04
Attending: PHYSICIAN ASSISTANT
Payer: COMMERCIAL

## 2025-06-04 DIAGNOSIS — I63.50 CEREBROVASCULAR ACCIDENT (CVA) DUE TO STENOSIS OF CEREBRAL ARTERY (HCC): ICD-10-CM

## 2025-06-04 DIAGNOSIS — I69.320 APHASIA AS LATE EFFECT OF CEREBROVASCULAR ACCIDENT (CVA): Primary | ICD-10-CM

## 2025-06-04 PROCEDURE — 92507 TX SP LANG VOICE COMM INDIV: CPT

## 2025-06-04 NOTE — PROGRESS NOTES
Daily Speech Treatment Note    Today's date: 2025   Patient’s name: Severiano Ball  : 1970  MRN: [id number]  Safety measures: Hx of CVA, hyperlipidemia, asthma   Referring provider: Selina Perez,*    Encounter Diagnosis     ICD-10-CM    1. 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 Cerebrovascular accident (CVA) due to stenosis of cerebral artery (HCC)  I63.50           Visit Tracking:  POC expires Unit limit Auth Expiration date PT/OT + Visit Limit?   25 N/A 2025 18   25 N/A  25 18                                 Visit/Unit Tracking  AUTH Status:  Date    RE 4/28 5/6 5/7 5/14 5/15 5/27  PU       Yes Used  3 4 5 6 7 8 9 1 2        Remaining   16 15 14 13 12 11 10 9 8           Subjective/Behavioral:  -Patient pleasant, engaged, and cooperative. Patient attended today's session unaccompanied. No new concerns reported.     Objective/Assessment: Patient with continued difficulty stating number sequences (age, ) given max cues 2/2 inability to repeat; increased frustration noted throughout trials, deferred to regulate emotions.    Short-term goals:  Patient will answer basic/simple personal Y/N questions with 80% accuracy, given mod fading to min cues, to facilitate increased auditory comprehension skills, to be achieved in 10-12 weeks.   Patient will follow one-step verbal directions (e.g., close your eyes) with 80% accuracy, given max fading to mod cues, to facilitate increased auditory comprehension skills, to be achieved in 10-12 weeks.   Patient will complete naming of automatic tasks (e.g., counting, listing days of the week/months of the year, etc.) with 80% accuracy given max fading to mod cues to facilitate increased verbal expression skills, to be achieved in 10-12 weeks.   25: Patient stated the days of the week with 60% accuracy given min visual/orthographic cues, increasing to 100% given mod visual and verbal  cues. Patient observed retracing and reinitiating sequence to self-cue. Continue with HEP.     Patient will relay personal information (e.g. name, date of birth, address), w/ or w/o the use of MADISON principles, with 80% accuracy given max fading to mod cues, to facilitate increased verbal expression skills, to be achieved in 10-12 weeks.   25: Patient relayed name with 50% accuracy given min orthographic and gesture cues, 100% accuracy with mod-max verbal cues and clinician models. Patient unable to state age and  (max stated is 1 of 3 parts - usually month is correct, occasionally date) given max cues including orthographic, verbal, visual, and clinician models with immediate, direct verbal feedback.     Patient will receptively identify common/functional items in a field of 3 increasing to 4 pictures, with 80% accuracy given max cues, in 3 out of 4 consecutive sessions, to be achieved in 10-12 weeks.    Plan:  -Discussed session and patient's progress with caregiver/family member after today's session.  -Continue with current plan of care.

## 2025-06-11 ENCOUNTER — OFFICE VISIT (OUTPATIENT)
Age: 55
End: 2025-06-11
Attending: PHYSICIAN ASSISTANT
Payer: COMMERCIAL

## 2025-06-11 DIAGNOSIS — I69.320 APHASIA AS LATE EFFECT OF CEREBROVASCULAR ACCIDENT (CVA): Primary | ICD-10-CM

## 2025-06-11 DIAGNOSIS — I63.50 CEREBROVASCULAR ACCIDENT (CVA) DUE TO STENOSIS OF CEREBRAL ARTERY (HCC): ICD-10-CM

## 2025-06-11 PROCEDURE — 92507 TX SP LANG VOICE COMM INDIV: CPT

## 2025-06-11 NOTE — PROGRESS NOTES
Daily Speech Treatment Note    Today's date: 2025   Patient’s name: Severiano Ball  : 1970  MRN: [id number]  Safety measures: Hx of CVA, hyperlipidemia, asthma   Referring provider: Selina Perez,*    Encounter Diagnosis     ICD-10-CM    1. 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 Cerebrovascular accident (CVA) due to stenosis of cerebral artery (HCC)  I63.50             Visit Tracking:  POC expires Unit limit Auth Expiration date PT/OT + Visit Limit?   25 N/A 2025 18   25 N/A  25 18                                 Visit/Unit Tracking  AUTH Status:  Date    RE 4/28 5/6 5/7 5/14 5/15 5/27  PU      Yes Used  3 4 5 6 7 8 9 1 2 3       Remaining   16 15 14 13 12 11 10 9 8 7          Subjective/Behavioral:  -Patient pleasant, engaged, and cooperative. Patient attended today's session unaccompanied. No new concerns reported.     Objective/Assessment: Patient's wife confirmed patient went shopping for tools this weekend, 2/2 communication breakdown with SLP unable to determine what patient meant when answering question of what he did over the weekend. Patient continues to IND utilize phone to refer to pictures taken as a method of increasing communicative success 2/2 significant expressive deficits.     Short-term goals:  Patient will answer basic/simple personal Y/N questions with 80% accuracy, given mod fading to min cues, to facilitate increased auditory comprehension skills, to be achieved in 10-12 weeks.   Patient will follow one-step verbal directions (e.g., close your eyes) with 80% accuracy, given max fading to mod cues, to facilitate increased auditory comprehension skills, to be achieved in 10-12 weeks.   Patient will complete naming of automatic tasks (e.g., counting, listing days of the week/months of the year, etc.) with 80% accuracy given max fading to mod cues to facilitate increased verbal expression skills, to be  achieved in 10-12 weeks.   25: Patient stated the days of the week with 60% accuracy given min visual/orthographic cues, increasing to 100% given mod visual and verbal cues. Patient observed retracing and reinitiating sequence to self-cue. Continue with HEP.     Patient will relay personal information (e.g. name, date of birth, address), w/ or w/o the use of MADISON principles, with 80% accuracy given max fading to mod cues, to facilitate increased verbal expression skills, to be achieved in 10-12 weeks.   25: Patient relayed name with 50% accuracy given min orthographic and gesture cues, 100% accuracy with mod-max verbal cues and clinician models. Patient unable to state age and  (max stated is 1 of 3 parts - usually month is correct, occasionally date) given max cues including orthographic, verbal, visual, and clinician models with immediate, direct verbal feedback.     Patient will receptively identify common/functional items in a field of 3 increasing to 4 pictures, with 80% accuracy given max cues, in 3 out of 4 consecutive sessions, to be achieved in 10-12 weeks.  25: Patient identified common items from a F.O. 3 with the following IND accuracy for the following categories: appliances: 67% accuracy, IND; foods: 60% accuracy, IND. Accuracy increased to 100% given mod verbal and visual cues including orthographic cues, phonetic spelling of target words, visual placement cues, gestures, immediate direct verbal feedback, and semantic cues. Patient given recommendation to complete nightly HEP with this task with either real life objects or flashcards. Patient and spouse expressed verbal understanding and agreement.     Plan:  -Discussed session and patient's progress with caregiver/family member after today's session.  -Continue with current plan of care.

## 2025-06-12 ENCOUNTER — OFFICE VISIT (OUTPATIENT)
Age: 55
End: 2025-06-12
Attending: PHYSICIAN ASSISTANT
Payer: COMMERCIAL

## 2025-06-12 DIAGNOSIS — I69.320 APHASIA AS LATE EFFECT OF CEREBROVASCULAR ACCIDENT (CVA): Primary | ICD-10-CM

## 2025-06-12 DIAGNOSIS — I63.50 CEREBROVASCULAR ACCIDENT (CVA) DUE TO STENOSIS OF CEREBRAL ARTERY (HCC): ICD-10-CM

## 2025-06-12 PROCEDURE — 92507 TX SP LANG VOICE COMM INDIV: CPT

## 2025-06-12 NOTE — PROGRESS NOTES
Daily Speech Treatment Note    Today's date: 2025   Patient’s name: Severiano Ball  : 1970  MRN: [id number]  Safety measures: Hx of CVA, hyperlipidemia, asthma   Referring provider: Selina Perez,*    Encounter Diagnosis     ICD-10-CM    1. 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 Cerebrovascular accident (CVA) due to stenosis of cerebral artery (HCC)  I63.50               Visit Tracking:  POC expires Unit limit Auth Expiration date PT/OT + Visit Limit?   25 N/A 2025 18   25 N/A  25 18                                 Visit/Unit Tracking  AUTH Status:  Date    RE 4/28 5/6 5/7 5/14 5/15 5/27  PU     Yes Used  3 4 5 6 7 8 9 1 2 3 4      Remaining   16 15 14 13 12 11 10 9 8 7 6         Subjective/Behavioral:  -Patient pleasant, engaged, and cooperative. Patient attended today's session unaccompanied. Patient verbally consented to allowing in a student observer, Jojo, into the session. No new concerns reported.     Objective/Assessment: Patient engaged in increased spontaneous dialogue; informally addressed w/o objective measures/date taken. Informal observation revealed increased successful spontaneous utterances, improved communicative success and efficiency, improved comprehension of clinician's questions. PACE approach utilized to improved communication success, incorporating written cues, gestures, verbal prompts, and pictures on phone for reference.    Short-term goals:  Patient will answer basic/simple personal Y/N questions with 80% accuracy, given mod fading to min cues, to facilitate increased auditory comprehension skills, to be achieved in 10-12 weeks.   25: Patient answered personal Y/N questions 25% accuracy IND, increasing to 88% given min visual, gesture, and/or orthographic cues; increased to 100% given mod verbal prompts.    Patient will follow one-step verbal directions (e.g., close your eyes) with  80% accuracy, given max fading to mod cues, to facilitate increased auditory comprehension skills, to be achieved in 10-12 weeks.   Patient will complete naming of automatic tasks (e.g., counting, listing days of the week/months of the year, etc.) with 80% accuracy given max fading to mod cues to facilitate increased verbal expression skills, to be achieved in 10-12 weeks.   25: Patient stated the days of the week with 60% accuracy given min visual/orthographic cues, increasing to 100% given mod visual and verbal cues. Patient observed retracing and reinitiating sequence to self-cue. Continue with HEP.     Patient will relay personal information (e.g. name, date of birth, address), w/ or w/o the use of MADISON principles, with 80% accuracy given max fading to mod cues, to facilitate increased verbal expression skills, to be achieved in 10-12 weeks.   25: Patient relayed name with 50% accuracy given min orthographic and gesture cues, 100% accuracy with mod-max verbal cues and clinician models. Patient unable to state age and  (max stated is 1 of 3 parts - usually month is correct, occasionally date) given max cues including orthographic, verbal, visual, and clinician models with immediate, direct verbal feedback.   25: Patient IND relayed name in spontaneous dialogue with introduction to student observer. Patient unable to relay other types of personal information, IND, or with max cues, including clinician models, with the exception of his oldest granddaughter's name, which he produced given mod verbal prompts.     Patient will receptively identify common/functional items in a field of 3 increasing to 4 pictures, with 80% accuracy given max cues, in 3 out of 4 consecutive sessions, to be achieved in 10-12 weeks.  25: Patient identified common items from a F.O. 3 with the following IND accuracy for the following categories: appliances: 67% accuracy, IND; foods: 60% accuracy, IND. Accuracy  increased to 100% given mod verbal and visual cues including orthographic cues, phonetic spelling of target words, visual placement cues, gestures, immediate direct verbal feedback, and semantic cues. Patient given recommendation to complete nightly HEP with this task with either real life objects or flashcards. Patient and spouse expressed verbal understanding and agreement.     Plan:  -Discussed session and patient's progress with caregiver/family member after today's session.  -Continue with current plan of care.

## 2025-06-16 ENCOUNTER — OFFICE VISIT (OUTPATIENT)
Age: 55
End: 2025-06-16
Attending: PHYSICIAN ASSISTANT
Payer: COMMERCIAL

## 2025-06-16 DIAGNOSIS — I63.50 CEREBROVASCULAR ACCIDENT (CVA) DUE TO STENOSIS OF CEREBRAL ARTERY (HCC): ICD-10-CM

## 2025-06-16 DIAGNOSIS — I69.320 APHASIA AS LATE EFFECT OF CEREBROVASCULAR ACCIDENT (CVA): Primary | ICD-10-CM

## 2025-06-16 PROCEDURE — 92507 TX SP LANG VOICE COMM INDIV: CPT

## 2025-06-16 NOTE — PROGRESS NOTES
Daily Speech Treatment Note    Today's date: 2025   Patient’s name: Severiano Ball  : 1970  MRN: [id number]  Safety measures: Hx of CVA, hyperlipidemia, asthma   Referring provider: Selina Perez,*    Encounter Diagnosis     ICD-10-CM    1. 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 Cerebrovascular accident (CVA) due to stenosis of cerebral artery (HCC)  I63.50             Visit Tracking:  POC expires Unit limit Auth Expiration date PT/OT + Visit Limit?   25 N/A 2025 18   25 N/A  25 18                                 Visit/Unit Tracking  AUTH Status:  Date    RE 4/28 5/6 5/7 5/14 5/15 5/27  PU       Yes Used  3 4 5 6 7 8 9 1 2 3 4 5        Remaining   16 15 14 13 12 11 10 9 8 7 6 5           Subjective/Behavioral:  -Patient pleasant, engaged, and cooperative. Patient attended today's session unaccompanied. Patient successfully conveyed, using pictures on his phone and clinician's verbal shaping of responses, to communicate he spent the weekend organizing personal tools. No new concerns reported.     Objective/Assessment: Patient presented with increased neologisms this day; poorer self-monitoring of productions noted during automatic sequences. Attempted to fade visual cues as possible for days of the week.     Short-term goals:  Patient will answer basic/simple personal Y/N questions with 80% accuracy, given mod fading to min cues, to facilitate increased auditory comprehension skills, to be achieved in 10-12 weeks.   25: Patient answered personal Y/N questions 25% accuracy IND, increasing to 88% given min visual, gesture, and/or orthographic cues; increased to 100% given mod verbal prompts.    Patient will follow one-step verbal directions (e.g., close your eyes) with 80% accuracy, given max fading to mod cues, to facilitate increased auditory comprehension skills, to be achieved in 10-12 weeks.  25:  Patient followed one-step verbal commands (controlled targets) with 90% accuracy given max cues including orthographic cues plus immediate models. Given as HEP. Probed patient's receptive identification of body parts, which he achieved with 100% accuracy given max cues including errorless learning.      Patient will complete naming of automatic tasks (e.g., counting, listing days of the week/months of the year, etc.) with 80% accuracy given max fading to mod cues to facilitate increased verbal expression skills, to be achieved in 10-12 weeks.   25: Patient stated the days of the week with 60% accuracy given min visual/orthographic cues, increasing to 100% given mod visual and verbal cues. Patient observed retracing and reinitiating sequence to self-cue. Continue with HEP.   25: Patient stated the days of the week with 20% accuracy, IND; 60% accuracy given gesture or visual/orthographic cue; 100% accuracy given mod verbal, visual, and gesture prompts. Patient stated the months of the year with 33% accuracy given max cues including orthographic, verbal, visuo-gestural, and clinician models. Months (only) given as HEP.     Patient will relay personal information (e.g. name, date of birth, address), w/ or w/o the use of MADISON principles, with 80% accuracy given max fading to mod cues, to facilitate increased verbal expression skills, to be achieved in 10-12 weeks.   25: Patient relayed name with 50% accuracy given min orthographic and gesture cues, 100% accuracy with mod-max verbal cues and clinician models. Patient unable to state age and  (max stated is 1 of 3 parts - usually month is correct, occasionally date) given max cues including orthographic, verbal, visual, and clinician models with immediate, direct verbal feedback.   25: Patient IND relayed name in spontaneous dialogue with introduction to student observer. Patient unable to relay other types of personal information, IND, or with max  cues, including clinician models, with the exception of his oldest granddaughter's name, which he produced given mod verbal prompts.     Patient will receptively identify common/functional items in a field of 3 increasing to 4 pictures, with 80% accuracy given max cues, in 3 out of 4 consecutive sessions, to be achieved in 10-12 weeks.  6/11/25: Patient identified common items from a F.O. 3 with the following IND accuracy for the following categories: appliances: 67% accuracy, IND; foods: 60% accuracy, IND. Accuracy increased to 100% given mod verbal and visual cues including orthographic cues, phonetic spelling of target words, visual placement cues, gestures, immediate direct verbal feedback, and semantic cues. Patient given recommendation to complete nightly HEP with this task with either real life objects or flashcards. Patient and spouse expressed verbal understanding and agreement.     Plan:  -Discussed session and patient's progress with caregiver/family member after today's session.  -Continue with current plan of care.

## 2025-06-17 ENCOUNTER — APPOINTMENT (OUTPATIENT)
Age: 55
End: 2025-06-17
Attending: PHYSICIAN ASSISTANT
Payer: COMMERCIAL

## 2025-06-20 ENCOUNTER — OFFICE VISIT (OUTPATIENT)
Dept: FAMILY MEDICINE CLINIC | Facility: CLINIC | Age: 55
End: 2025-06-20
Payer: COMMERCIAL

## 2025-06-20 VITALS
WEIGHT: 190 LBS | OXYGEN SATURATION: 96 % | DIASTOLIC BLOOD PRESSURE: 80 MMHG | TEMPERATURE: 97 F | HEART RATE: 80 BPM | HEIGHT: 70 IN | SYSTOLIC BLOOD PRESSURE: 122 MMHG | BODY MASS INDEX: 27.2 KG/M2

## 2025-06-20 DIAGNOSIS — E78.2 MIXED HYPERLIPIDEMIA: Primary | ICD-10-CM

## 2025-06-20 DIAGNOSIS — I63.50 CEREBROVASCULAR ACCIDENT (CVA) DUE TO STENOSIS OF CEREBRAL ARTERY (HCC): ICD-10-CM

## 2025-06-20 PROCEDURE — 99213 OFFICE O/P EST LOW 20 MIN: CPT | Performed by: PHYSICIAN ASSISTANT

## 2025-06-20 NOTE — PROGRESS NOTES
Name: Severiano Ball      : 1970      MRN: [id number]  Encounter Provider: Selina Perez PA-C  Encounter Date: 2025   Encounter department: [facility] [address]  :  Assessment & Plan  Mixed hyperlipidemia  Controlled with atorvastatin 80 mg daily.  Continue current medication at current dose.   Orders:  •  CBC and differential; Future  •  Lipid Panel with Direct LDL reflex; Future  •  Comprehensive metabolic panel; Future  •  TSH, 3rd generation with Free T4 reflex; Future    Cerebrovascular accident (CVA) due to stenosis of cerebral artery (HCC)  Pt still has some memory difficulties and aphasia.  He is still undergoing speech therapy and is to continue to do so.   Orders:  •  CBC and differential; Future  •  Lipid Panel with Direct LDL reflex; Future  •  Comprehensive metabolic panel; Future  •  TSH, 3rd generation with Free T4 reflex; Future           History of Present Illness   Pt is here for his 6 month follow up. He is still doing speech therapy. He has had some improvement but slow to occur. He does get frustrated.     Hyperlipidemia  This is a chronic problem. The problem is controlled. Recent lipid tests were reviewed and are normal. He has no history of chronic renal disease, diabetes, hypothyroidism, liver disease, obesity or nephrotic syndrome. Factors aggravating his hyperlipidemia include smoking. Pertinent negatives include no chest pain, myalgias or shortness of breath. Current antihyperlipidemic treatment includes statins. The current treatment provides significant improvement of lipids. There are no compliance problems.  Risk factors for coronary artery disease include dyslipidemia and male sex.     Review of Systems   Constitutional:  Negative for fatigue.   Respiratory:  Negative for chest tightness and shortness of breath.    Cardiovascular:  Negative for chest pain, palpitations and leg swelling.   Gastrointestinal:  Negative for  "abdominal pain, constipation and diarrhea.   Musculoskeletal:  Negative for back pain, myalgias and neck pain.   Neurological:  Positive for speech difficulty. Negative for dizziness, light-headedness and headaches.       Objective   /80   Pulse 80   Temp (!) 97 °F (36.1 °C)   Ht 5' 10\" (1.778 m)   Wt 86.2 kg (190 lb)   SpO2 96%   BMI 27.26 kg/m²      Physical Exam  Vitals and nursing note reviewed.   Constitutional:       Appearance: Normal appearance. He is well-developed.   HENT:      Head: Normocephalic.      Right Ear: Hearing, tympanic membrane, ear canal and external ear normal.      Left Ear: Hearing, tympanic membrane, ear canal and external ear normal.      Nose: Nose normal.      Mouth/Throat:      Mouth: Mucous membranes are moist.      Pharynx: Oropharynx is clear. Uvula midline.     Eyes:      Extraocular Movements: Extraocular movements intact.      Conjunctiva/sclera: Conjunctivae normal.     Neck:      Thyroid: No thyromegaly.      Vascular: No carotid bruit.     Cardiovascular:      Rate and Rhythm: Normal rate and regular rhythm.      Pulses: Normal pulses.      Heart sounds: Normal heart sounds.   Pulmonary:      Effort: Pulmonary effort is normal.      Breath sounds: Normal breath sounds.   Abdominal:      General: Bowel sounds are normal.      Palpations: Abdomen is soft. There is no mass.      Tenderness: There is no abdominal tenderness. There is no right CVA tenderness or left CVA tenderness.     Musculoskeletal:         General: Normal range of motion.      Cervical back: Normal range of motion and neck supple.      Right lower leg: No edema.      Left lower leg: No edema.   Lymphadenopathy:      Cervical: No cervical adenopathy.     Skin:     General: Skin is warm and dry.      Capillary Refill: Capillary refill takes less than 2 seconds.     Neurological:      Mental Status: He is alert and oriented to person, place, and time. Mental status is at baseline.     Psychiatric:    "      Mood and Affect: Mood normal.         Behavior: Behavior normal.         Thought Content: Thought content normal.         Judgment: Judgment normal.

## 2025-06-20 NOTE — ASSESSMENT & PLAN NOTE
Pt still has some memory difficulties and aphasia.  He is still undergoing speech therapy and is to continue to do so.   Orders:  •  CBC and differential; Future  •  Lipid Panel with Direct LDL reflex; Future  •  Comprehensive metabolic panel; Future  •  TSH, 3rd generation with Free T4 reflex; Future

## 2025-06-20 NOTE — ASSESSMENT & PLAN NOTE
Controlled with atorvastatin 80 mg daily.  Continue current medication at current dose.   Orders:  •  CBC and differential; Future  •  Lipid Panel with Direct LDL reflex; Future  •  Comprehensive metabolic panel; Future  •  TSH, 3rd generation with Free T4 reflex; Future

## 2025-06-25 ENCOUNTER — EVALUATION (OUTPATIENT)
Age: 55
End: 2025-06-25
Attending: PHYSICIAN ASSISTANT
Payer: COMMERCIAL

## 2025-06-25 DIAGNOSIS — I63.50 CEREBROVASCULAR ACCIDENT (CVA) DUE TO STENOSIS OF CEREBRAL ARTERY (HCC): ICD-10-CM

## 2025-06-25 DIAGNOSIS — I69.320 APHASIA AS LATE EFFECT OF CEREBROVASCULAR ACCIDENT (CVA): Primary | ICD-10-CM

## 2025-06-25 PROCEDURE — 92507 TX SP LANG VOICE COMM INDIV: CPT

## 2025-06-25 NOTE — PROGRESS NOTES
Speech-Language Pathology Progress Update    Today's date: 2025   Patient’s name: Severiano Ball  : 1970  MRN: [id number]  Safety measures:  Hx of CVA, hyperlipidemia, asthma   Referring provider: Selina Perez,*    Encounter Diagnosis     ICD-10-CM    1. 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 Cerebrovascular accident (CVA) due to stenosis of cerebral artery (HCC)  I63.50           Assessment:   Patient continues to present with mixed severe aphasia c/b significant deficits in both receptive and expressive language, including the ability to identify and name common/functional items, follow simple and complex, multi-step commands, answer Y/N questions, relay personal information, state automatic sequences (e.g. days of the week), significantly reduced auditory comprehension for longer utterances, and significant limitations contributing to conversations 2/2 language processing and verbal expression deficits. Per patient's wife report, patient continues to IND complete tasks around the home involving minor to major repairs (e.g. installing new motion detection light, assembling a bed frame) and frequently recalls sequential steps to previously conducted jobs. However, patient continues to be significantly limited in verbal expression should a medical emergency arise during a home repair task. Patient continues to demonstrate evolving use of pictures (stored in personal cell phone) as supplementary expressive language support to verbal output; however, there is significant probability of communication breakdown should listener not understand or be aware of topic. Patient continues to demonstrate variability in completion of HEP; suspected to be 2/2 motivation levels and frustration with success of tasks. Clinician continues to emphasize importance of daily practice to improve neuronal repair. Patient continues to demonstrate improvements in context and length  of spontaneous utterances observed during structured and unstructured tasks and discourse. Patient remains motivated to attend tx, and continues to make slow but steady progress on all goals. Patient continues to deal with significant frustration 2/2 communication impairments and benefits from structured breaks with implementation of diaphragmatic breathing exercises to reduce emotional lability (anger/frustration). Patient would benefit from continued outpatient skilled Speech Therapy services to support the highest level of independence with his PLOF, rehabilitate expressive/receptive language skills, promote positive communication interactions with both familiar & unfamiliar listeners, patient and caregiver education/training in compensatory strategies, increase communication of wants/needs, utilize expressive/receptive language within functional activities, participate in meaningful activities, allow for increased socialization, promote safety, reduce caregiver burden, and facilitate overall improved quality of life.        Short-term goals:  Patient will answer basic/simple Y/N questions with 80% accuracy, given mod fading to min cues, to facilitate increased auditory comprehension skills, to be achieved in 10-12 weeks. ONGOING   Patient will follow one-step verbal directions (e.g., close your eyes) with 80% accuracy, given max fading to mod cues, to facilitate increased auditory comprehension skills, to be achieved in 10-12 weeks. ONGOING   Patient will complete naming of automatic tasks (e.g., counting, listing days of the week/months of the year, etc.) with 80% accuracy given max fading to mod cues to facilitate increased verbal expression skills, to be achieved in 10-12 weeks. ONGOING   Patient will relay personal information (e.g. name, date of birth, address), w/ or w/o the use of MADISON principles, with 80% accuracy given max fading to mod cues, to facilitate increased verbal expression skills, to be  achieved in 10-12 weeks. ONGOING   Patient will receptively identify common/functional items in a field of 3 increasing to 4 pictures, with 80% accuracy given max cues, in 3 out of 4 consecutive sessions, to be achieved in 10-12 weeks. ONGOING     Long-term goals:  Patient will demonstrate improved expressive and receptive language skills during structured and unstructured tasks by discharge. ONGOING  Patient will improve ability to facilitate communication to meet needs including use of compensatory strategies to promote meaningful interactions for improved quality of life and maximize level of independence. ONGOING         Plan:  Patient would benefit from outpatient skilled Speech Therapy services: Speech-language therapy    Frequency: 2-4x weekly  Duration: 1 month    Intervention certification from: 6/25/2025  Intervention certification to: 7/21/2025      Subjective:  -Patient pleasant, engaged, and cooperative. Patient attended today's session unaccompanied. Patient's wife reported patient has not completed HEP this past week 2/2 his dad being hospitalized.        Patient's goal(s): Per patient interview with fixed choices or Y/N questions: to get speech better; understand others more, reduce frustration with communication.    Pain: Present (scale: Patient could not rate pain scale 2/2 communication impairment)      Objective (testing):  No formal testing administered as today's session serves as a progress update.         Treatment:  Patient will receptively identify common/functional items in a field of 3 increasing to 4 pictures, with 80% accuracy given max cues, in 3 out of 4 consecutive sessions, to be achieved in 10-12 weeks.  6/25/25: Patient identified common items from a F.O. 3 with the following accuracy: foods: (randomized presentation of varied food groups) 85% accuracy, IND! Furniture: 30% accuracy, IND, increasing to 100% accuracy given mod verbal semantic cues; household (e.g. utensils, glasses,  scissors, dishware, etc.): 70% accuracy, IND, increasing to 100% accuracy given mod verbal and gesture cues. Continue with HEP; daily HEP emphasized with wife following tx; patient gesturally confirmed comprehension (with head nod); wife expressed verbal comprehension and agreement with recommendations.         Visit Tracking:  POC expires Unit limit Auth Expiration date PT/OT + Visit Limit?   5/5/25 N/A 6/5/2025 18   7/21/25 N/A  8/28/25 18                                 Visit/Unit Tracking  AUTH Status:  Date  4/21  RE 4/28 5/6 5/7 5/14 5/15 5/27  PU 5/30 6/4 6/11 6/12 6/16 6/25       Yes Used  3 4 5 6 7 8 9 1 2 3 4 5  6         Remaining   16 15 14 13 12 11 10 9 8 7 6 5  4            Intervention comments:  - 45 minutes language tx targeting receptive language/identification

## 2025-06-30 ENCOUNTER — OFFICE VISIT (OUTPATIENT)
Age: 55
End: 2025-06-30
Attending: PHYSICIAN ASSISTANT
Payer: COMMERCIAL

## 2025-06-30 DIAGNOSIS — I63.50 CEREBROVASCULAR ACCIDENT (CVA) DUE TO STENOSIS OF CEREBRAL ARTERY (HCC): ICD-10-CM

## 2025-06-30 DIAGNOSIS — I69.320 APHASIA AS LATE EFFECT OF CEREBROVASCULAR ACCIDENT (CVA): Primary | ICD-10-CM

## 2025-06-30 PROCEDURE — 92507 TX SP LANG VOICE COMM INDIV: CPT

## 2025-06-30 NOTE — PROGRESS NOTES
"Daily Speech Treatment Note    Today's date: 2025  Patient’s name: Severiano Ball  : 1970  MRN: [id number]  Safety measures: Hx of CVA, hyperlipidemia, asthma   Referring provider: Selina Perez,*    Encounter Diagnosis     ICD-10-CM    1. 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 Cerebrovascular accident (CVA) due to stenosis of cerebral artery (HCC)  I63.50           Visit Tracking:  POC expires Unit limit Auth Expiration date PT/OT + Visit Limit?   25 N/A 2025 18   25 N/A  25 18                                 Visit/Unit Tracking  AUTH Status:  Date    PU              Yes Used  6  1              Remaining   4  9                 Subjective/Behavioral:  -Patient pleasant, engaged, and cooperative. Patient attended today's session unaccompanied. No new concerns reported.       Objective/Assessment:  -Reviewed testing results and goals in plan care with patient. Patient is in agreement at this time.  -Patient's wife reported completing HEP with flashcards, as per wife, \"some of them he's really really good at\".     Short-term goals:  Patient will answer basic/simple Y/N questions with 80% accuracy, given mod fading to min cues, to facilitate increased auditory comprehension skills, to be achieved in 10-12 weeks.   25: Patient answered basic personal Y/N questions (e.g. \"Is your name Jonh?\") with 60% accuracy given min orthographic cues, increasing to 93% accuracy given max cues including models.   Patient will follow one-step verbal directions (e.g., close your eyes) with 80% accuracy, given max fading to mod cues, to facilitate increased auditory comprehension skills, to be achieved in 10-12 weeks.    Patient will complete naming of automatic tasks (e.g., counting, listing days of the week/months of the year, etc.) with 80% accuracy given max fading to mod cues to facilitate increased verbal expression skills, to be achieved in " 10-12 weeks.   Patient will relay personal information (e.g. name, date of birth, address), w/ or w/o the use of MADISON principles, with 80% accuracy given max fading to mod cues, to facilitate increased verbal expression skills, to be achieved in 10-12 weeks.  25: Patient stated name with 100% accuracy given mod verbal and orthographic cues; he stated his age with 50% accuracy given max cues; he stated his  with 50% accuracy for the month and date given mod verbal and visual cues; he stated the year of his birth with 0% accuracy given max cues including errorless learning and spaced retrieval. Continue with HEP with focus on year.  Patient will receptively identify common/functional items in a field of 3 increasing to 4 pictures, with 80% accuracy given max cues, in 3 out of 4 consecutive sessions, to be achieved in 10-12 weeks    Plan:  -Patient was provided with home exercises/activities to target goals in plan of care at the end of today's session.  -Continue with current plan of care.

## 2025-07-02 ENCOUNTER — OFFICE VISIT (OUTPATIENT)
Age: 55
End: 2025-07-02
Attending: PHYSICIAN ASSISTANT
Payer: COMMERCIAL

## 2025-07-02 DIAGNOSIS — I69.320 APHASIA AS LATE EFFECT OF CEREBROVASCULAR ACCIDENT (CVA): Primary | ICD-10-CM

## 2025-07-02 DIAGNOSIS — I63.50 CEREBROVASCULAR ACCIDENT (CVA) DUE TO STENOSIS OF CEREBRAL ARTERY (HCC): ICD-10-CM

## 2025-07-02 PROCEDURE — 92507 TX SP LANG VOICE COMM INDIV: CPT

## 2025-07-02 NOTE — PROGRESS NOTES
"Daily Speech Treatment Note    Today's date: 2025  Patient’s name: Severiano Ball  : 1970  MRN: [id number]  Safety measures: Hx of CVA, hyperlipidemia, asthma   Referring provider: Selina Perez,*    Encounter Diagnosis     ICD-10-CM    1. 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 Cerebrovascular accident (CVA) due to stenosis of cerebral artery (HCC)  I63.50             Visit Tracking:  POC expires Unit limit Auth Expiration date PT/OT + Visit Limit?   25 N/A 2025 18   25 N/A  25 18                                 Visit/Unit Tracking  AUTH Status:  Date    PU            Yes Used  6  1  2            Remaining   4  9  8               Subjective/Behavioral:  -Patient pleasant, engaged, and cooperative. Patient attended today's session unaccompanied. No new concerns reported.       Objective/Assessment: Faded verbal and orthographic cues for days of the week and implemented as necessary in least-to-most approach. Utilized errorless learning and spaced retrieval per month, for months of the year; patient makes most errors at /July, then needs to restart to promote automatic retrieval/production. Discussed cueing hierarchy with patient's wife following session.       Short-term goals:  Patient will answer basic/simple Y/N questions with 80% accuracy, given mod fading to min cues, to facilitate increased auditory comprehension skills, to be achieved in 10-12 weeks.   25: Patient answered basic personal Y/N questions (e.g. \"Is your name Jonh?\") with 60% accuracy given min orthographic cues, increasing to 93% accuracy given max cues including models.   Patient will follow one-step verbal directions (e.g., close your eyes) with 80% accuracy, given max fading to mod cues, to facilitate increased auditory comprehension skills, to be achieved in 10-12 weeks.    Patient will complete naming of automatic tasks (e.g., counting, listing " days of the week/months of the year, etc.) with 80% accuracy given max fading to mod cues to facilitate increased verbal expression skills, to be achieved in 10-12 weeks.   25: Patient stated the days of the week with 30% accuracy, IND, increasing to 50% accuracy given min orthographic cues, and 100% accuracy given mod verbal cues! Patient stated the months of the years with 0% accuracy given max cues including immediate clinician models, orthographic cues, gestures, repeated models, direct verbal instruction, placement cues, and immediate, direct verbal feedback. Extra practice with months of the year for HEP.      Patient will relay personal information (e.g. name, date of birth, address), w/ or w/o the use of MADISON principles, with 80% accuracy given max fading to mod cues, to facilitate increased verbal expression skills, to be achieved in 10-12 weeks.  25: Patient stated name with 100% accuracy given mod verbal and orthographic cues; he stated his age with 50% accuracy given max cues; he stated his  with 50% accuracy for the month and date given mod verbal and visual cues; he stated the year of his birth with 0% accuracy given max cues including errorless learning and spaced retrieval. Continue with HEP with focus on year.  Patient will receptively identify common/functional items in a field of 3 increasing to 4 pictures, with 80% accuracy given max cues, in 3 out of 4 consecutive sessions, to be achieved in 10-12 weeks    Plan:  -Patient was provided with home exercises/activities to target goals in plan of care at the end of today's session.  -Continue with current plan of care.

## 2025-07-07 ENCOUNTER — APPOINTMENT (OUTPATIENT)
Age: 55
End: 2025-07-07
Attending: PHYSICIAN ASSISTANT
Payer: COMMERCIAL

## 2025-07-09 ENCOUNTER — OFFICE VISIT (OUTPATIENT)
Age: 55
End: 2025-07-09
Attending: PHYSICIAN ASSISTANT
Payer: COMMERCIAL

## 2025-07-09 ENCOUNTER — NURSE TRIAGE (OUTPATIENT)
Age: 55
End: 2025-07-09

## 2025-07-09 DIAGNOSIS — I69.320 APHASIA AS LATE EFFECT OF CEREBROVASCULAR ACCIDENT (CVA): Primary | ICD-10-CM

## 2025-07-09 DIAGNOSIS — I63.50 CEREBROVASCULAR ACCIDENT (CVA) DUE TO STENOSIS OF CEREBRAL ARTERY (HCC): ICD-10-CM

## 2025-07-09 PROCEDURE — 92507 TX SP LANG VOICE COMM INDIV: CPT

## 2025-07-09 NOTE — PROGRESS NOTES
Daily Speech Treatment Note    Today's date: 2025  Patient’s name: Severiano Ball  : 1970  MRN: [id number]  Safety measures: Hx of CVA, hyperlipidemia, asthma   Referring provider: Selina Perez,*    Encounter Diagnosis     ICD-10-CM    1. 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 Cerebrovascular accident (CVA) due to stenosis of cerebral artery (HCC)  I63.50               Visit Tracking:  POC expires Unit limit Auth Expiration date PT/OT + Visit Limit?   25 N/A 2025 18   25 N/A  25 18                                 Visit/Unit Tracking  AUTH Status:  Date    PU           Yes Used  6  1  2 3           Remaining   4  9  8 7              Subjective/Behavioral:  -Patient pleasant, engaged, and cooperative. Patient attended today's session unaccompanied. Patient's wife reported he has c/o increased pain under R underarm, which, according to patient interview, radiates down triceps; and also has pain in leg.      Objective/Assessment:Due to hx of stent placement in R arm, and hx of CVA, BP was taken manually at 12:40, reading at 142/94; patient denies any other symptoms though patient is a poor historian 2/2 aphasia. Clinician implemented low-tech AAC board for pain indication - after review and direct verbal instruction on icons, patient IND informed clinician he had little to no pain currently; at worst, he has moderate pain. He indicated symptoms to include cramping and aching. Informed patient and spouse of BP reading, using AAC board to supplement verbal expression, and to take low-tech board to physician's office to facilitate improved communication. Patient and wife informed should BP increase, or he experience chest pain, dizziness, confusion, worsened speech, to take him directly to ED; patient's wife in agreement. Targeted patient's ability to receptively identify body parts given max cues, which he did with 91% accuracy.  "      Short-term goals:  Patient will answer basic/simple Y/N questions with 80% accuracy, given mod fading to min cues, to facilitate increased auditory comprehension skills, to be achieved in 10-12 weeks.   25: Patient answered basic personal Y/N questions (e.g. \"Is your name Jonh?\") with 60% accuracy given min orthographic cues, increasing to 93% accuracy given max cues including models.   Patient will follow one-step verbal directions (e.g., close your eyes) with 80% accuracy, given max fading to mod cues, to facilitate increased auditory comprehension skills, to be achieved in 10-12 weeks.    Patient will complete naming of automatic tasks (e.g., counting, listing days of the week/months of the year, etc.) with 80% accuracy given max fading to mod cues to facilitate increased verbal expression skills, to be achieved in 10-12 weeks.   25: Patient stated the days of the week with 30% accuracy, IND, increasing to 50% accuracy given min orthographic cues, and 100% accuracy given mod verbal cues! Patient stated the months of the years with 0% accuracy given max cues including immediate clinician models, orthographic cues, gestures, repeated models, direct verbal instruction, placement cues, and immediate, direct verbal feedback. Extra practice with months of the year for HEP.      Patient will relay personal information (e.g. name, date of birth, address), w/ or w/o the use of [place] principles, with 80% accuracy given max fading to mod cues, to facilitate increased verbal expression skills, to be achieved in 10-12 weeks.  25: Patient stated name with 100% accuracy given mod verbal and orthographic cues; he stated his age with 50% accuracy given max cues; he stated his  with 50% accuracy for the month and date given mod verbal and visual cues; he stated the year of his birth with 0% accuracy given max cues including errorless learning and spaced retrieval. Continue with HEP with focus on " year.  Patient will receptively identify common/functional items in a field of 3 increasing to 4 pictures, with 80% accuracy given max cues, in 3 out of 4 consecutive sessions, to be achieved in 10-12 weeks    Plan:  -Patient was provided with home exercises/activities to target goals in plan of care at the end of today's session.  -Continue with current plan of care.

## 2025-07-09 NOTE — TELEPHONE ENCOUNTER
"REASON FOR CONVERSATION: Leg Pain and Arm Pain (Right arm possible stent placed last year)    SYMPTOMS: C/o new onset right calf pain for 1mth now. No SOB, Chest pain, and numbness/tingling at this time. Wife states pt is non verbal and feels his pain rate is an 8 due to him getting diaphoretic/frustrated with the pain during activities.     OTHER HEALTH INFORMATION: Hx: blood clots and cardiac issues    PROTOCOL DISPOSITION: Go to ED/UCC Now (Or to Office with PCP Approval)    CARE ADVICE PROVIDED: Triage nurse advised wife to take pt to ER and call back if  wont go.    PRACTICE FOLLOW-UP: PCP please f/u with wife to see if Patient will go to ER.         Reason for Disposition   Thigh or calf pain in only one leg and present > 1 hour    Answer Assessment - Initial Assessment Questions  1. ONSET: \"When did the pain start?\"       Couple mths now  2. LOCATION: \"Where is the pain located?\"       Right leg at calf  3. PAIN: \"How bad is the pain?\"    (Scale 1-10; or mild, moderate, severe)      8 but known   4. WORK OR EXERCISE: \"Has there been any recent work or exercise that involved this part of the body?\"       Did over use/work   5. CAUSE: \"What do you think is causing the leg pain?\"      unkwn  6. OTHER SYMPTOMS: \"Do you have any other symptoms?\" (e.g., chest pain, back pain, breathing difficulty, swelling, rash, fever, numbness, weakness)      Denies   7. PREGNANCY: \"Is there any chance you are pregnant?\" \"When was your last menstrual period?\"      N/a    Protocols used: Leg Pain-Adult-[place]    "

## 2025-07-10 ENCOUNTER — OFFICE VISIT (OUTPATIENT)
Age: 55
End: 2025-07-10
Attending: PHYSICIAN ASSISTANT
Payer: COMMERCIAL

## 2025-07-10 DIAGNOSIS — I69.320 APHASIA AS LATE EFFECT OF CEREBROVASCULAR ACCIDENT (CVA): Primary | ICD-10-CM

## 2025-07-10 DIAGNOSIS — I63.50 CEREBROVASCULAR ACCIDENT (CVA) DUE TO STENOSIS OF CEREBRAL ARTERY (HCC): ICD-10-CM

## 2025-07-10 PROCEDURE — 96105 ASSESSMENT OF APHASIA: CPT

## 2025-07-10 NOTE — PROGRESS NOTES
Speech-Language Pathology Re-Evaluation    Today's date: 7/10/2025   Patient’s name: Severiano Ball  : 1970  MRN: [id number]  Safety measures: Hx of CVA, hyperlipidemia, asthma   Referring provider: Selina Perez,*    Encounter Diagnosis     ICD-10-CM    1. 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 Cerebrovascular accident (CVA) due to stenosis of cerebral artery (HCC)  I63.50           Assessment:   Patient continues to present with mixed severe aphasia c/b significant deficits in both receptive and expressive language, including the ability to identify and name common/functional items, follow simple and complex, multi-step commands, answer Y/N questions, relay personal information, state automatic sequences (e.g. days of the week), significantly reduced auditory comprehension for longer utterances, impaired repetition, and significant limitations contributing to conversations 2/2 language processing and verbal expression deficits. Patient was administered The [place] Diagnostic Aphasia Evaluation - Third Edition (BDAE-3) upon today's RE. The test could not be administered in its entirety 2/2 patient requesting deferment 2/2 increased frustration and shut down. Patient demonstrated improvement in the areas of simple social responses and basic word discrimination; he experienced a decline in following commands; and he demonstrated no change in the areas of total severity rating, understanding of complex ideational material, and recitation of automatic sequences. Patient continues to experience significant frustration and anger 2/2 communication impairment, though his wife states their functional language has improved since IE. Patient's wife reported he often speaks in longer sentences at home, which he does not exhibit as often in clinic; she suspects this may be 2/2 increased anxiety and concern of performance. Patient continues to demonstrate increased use of  picture cues to supplement verbal expression in order to facilitate improved communicative effectiveness and efficiency. Patient recently presenting with c/o of increased discomfort, pain, ache, and soreness on the underside of R arm, where wife stated stent was reportedly placed. Patient demonstrated significant difficulty conveying intended message to describe pain, though he gestured often throughout communication exchange. Clinician utilized low-tech AAC board to facilitate increased expression of pain rating, description, and location. Patient responded well to low-tech pain description board and would benefit from probing of stimulability of generalization of use to various low-tech board types. Patent and wife recommended to f/u with PCP regarding new onset of discomfort in R UE, which wife reportedly has already attempted. Patient requires significant assistance for successful interactions with a variety of familiar listeners and continues to require the guidance of a skilled clinician to improve functional communication skills. Patient would benefit from continued outpatient skilled Speech Therapy services to support the highest level of independence with his PLOF, rehabilitate expressive/receptive language skills, promote positive communication interactions with both familiar & unfamiliar listeners, patient and caregiver education/training in compensatory strategies, increase communication of wants/needs, utilize expressive/receptive language within functional activities, participate in meaningful activities, allow for increased socialization, promote safety, reduce caregiver burden, and facilitate overall improved quality of life.        Short-term goals:  Patient will answer basic/simple Y/N questions with 80% accuracy, given mod fading to min cues, to facilitate increased auditory comprehension skills, to be achieved in 10-12 weeks. MAX POTENTIAL REACHED. D/C GOAL.   Patient will follow one-step verbal  directions (e.g., close your eyes) with 80% accuracy, given max fading to mod cues, to facilitate increased auditory comprehension skills, to be achieved in 10-12 weeks. MAX POTENTIAL REACHED. D/C GOAL.   Patient will complete naming of automatic tasks (e.g., counting, listing days of the week/months of the year, etc.) with 80% accuracy given max fading to mod cues to facilitate increased verbal expression skills, to be achieved in 10-12 weeks. MAX POTENTIAL REACHED. D/C GOAL.   Patient will relay personal information (e.g. name, date of birth, address), w/ or w/o the use of [place] principles, with 80% accuracy given max fading to mod cues, to facilitate increased verbal expression skills, to be achieved in 10-12 weeks. MAX POTENTIAL REACHED. D/C GOAL.   Patient will receptively identify common/functional items in a field of 3 increasing to 4 pictures, with 80% accuracy given max cues, in 3 out of 4 consecutive sessions, to be achieved in 10-12 weeks. MAX POTENTIAL REACHED. D/C GOAL.       New Short-term Goals:  READING  Patient will facilitate functional reading skills by matching word to picture with 60% accuracy, given max cues, in 3 out of 4 consecutive sessions, to facilitate growth of vocabulary concept comprehension, to be achieved in 10-12 weeks.  Patient will facilitate functional reading skills by matching word to phrase/function with 60% accuracy, given max cues, in 3 out of 4 consecutive sessions, to facilitate growth of vocabulary concept comprehension, to be achieved in 10-12 weeks.  Patient will facilitate functional reading skills by following basic one-step written directions (e.g., close your eyes) with 60% accuracy, given max cues, in 3 out of 4 consecutive sessions, to facilitate increased reading comprehension skills, to be achieved in 10-12 weeks.    LISTENING  Patient will identify body parts with 60% accuracy, given max cues, in 3 out of 4 consecutive sessions, to facilitate improved receptive  "language skills, to be achieved in 10-12 weeks.     SPEAKING  Patient will provide an adequate response to confrontation naming task (i.e., what is…) with 60% accuracy, given max cues, in 3 out of 4 consecutive sessions, to facilitate increased expressive language skills, to be achieved in 10-12 weeks.  Patient will label body parts with 60% accuracy, given max cues, in 3 out of 4 consecutive sessions, to facilitate increased expressive language skills, to be achieved in 10-12 weeks.        Long-term goals:  Patient will demonstrate improved expressive and receptive language skills during structured and unstructured tasks by discharge. ONGOING  Patient will improve ability to facilitate communication to meet needs including use of compensatory strategies to promote meaningful interactions for improved quality of life and maximize level of independence. ONGOING         Plan:  Patient would benefit from outpatient skilled Speech Therapy services: Speech-language therapy    Frequency: 2-4x weekly  Duration: 3 months    Intervention certification from: 7/10/2025  Intervention certification to: 10/10/2025      Subjective:  -Patient pleasant, engaged, and cooperative. Patient attended today's session unaccompanied. No new concerns reported.       Patient's goal(s): \"To get it done\"    Pain: Absent (scale: N/A)      Objective (testing):  The [place] Diagnostic Aphasia Examination-Third Edition (BDAE-3) is a comprehensive standardized assessment designed to evaluate a broad range of language impairments that often arise as a consequence of organic brain dysfunction. The BDAE-3 is divided into five subtests, including conversational & expository speech, auditory comprehension, oral expression, reading, and writing. The results of the BDAE-3 are used to classify a patient’s language profiles into one of the localization-based classifications of aphasia: Broca’s, Wernicke’s, anomic, conduction, transcortical, transcortical " motor, transcortical sensory, and global aphasia syndromes, although the test does not always provide a diagnosis or a therapeutic approach. The following results were obtained during the administration of the short form assessment:       Score: Percentile: IE Score: Status:   SEVERITY RATIN/5 40%ile  NO CHANGE          FLUENCY:       -Phrase length (rating scale)  30%ile N/A N/A   -Melodic line (rating scale)  60%ile N/A N/A   -Grammatical form (rating scale)  30%ile N/A N/A          CONVERSATION/EXPOSITORY SPEECH:      -Simple social responses 3/7 10%ile 1/ IMPROVEMENT          AUDITORY COMPREHENSION:       -Basic word discrimination  10%ile 9; 0%ile IMPROVEMENT   -Commands 0/10 0%ile 2/15 DECLINE   -Complex ideational material 0 0%ile 0/12 NO CHANGE          ARTICULATION:       -Articulatory agility (rating scale)  70%ile N/A N/A          RECITATION:       -Automatized sequences 0 0%ile 0/8 NO CHANGE          REPETITION:       -Words  10%ile  N/A N/A   -Sentences 0 30%ile N/A N/A          NAMING:       -Responsive naming 0/10 10%ile N/A N/A   -[place] Naming Test - short DNT N/A N/A N/A   -Special categories  5%ile N/A N/A     “IE” indicates the scores from the initial evaluation (2025).    Due to time constraints, only portions of the standardized assessment were administered on this date of service.      Overall, patient presents with a severe expressive/receptive aphasia with a severity rating of 1 (out of a possible 5 being fluent speech).    0 - No usable speech or auditory comprehension.     1 - All communication is through fragmentary expression; great need for inference, questioning, and guessing by the listener. The range of information that can be exchanged is limited, and the listener carries the burden of communication.      2 - Conversation about familiar subjects is possible with help from the listener. There are frequent failures to convey the idea, but  the patient shares the burden of communication.      3 - The patient can discuss almost all everyday problems with little or no assistance. Reduction of speech and/or comprehension, however, makes conversation about certain material difficult or impossible.      4 - Some obvious loss of fluency in speech or facility of comprehension, without significant limitation on ideas expressed or form of expression.      5 - Minimal discernible speech handicap; the patient may have subjective difficulties that are not obvious to the listener.                  Visit Tracking:  POC expires Unit limit Auth Expiration date PT/OT + Visit Limit?   5/5/25 N/A 6/5/2025 18   7/21/25 N/A  8/28/25 18                                 Visit/Unit Tracking  AUTH Status:  Date  6/25  PU  6/30  7/2 7/9  7/10  RE             Yes Used  6  1  2 3  4               Remaining   4  9  8 7  6                  Intervention comments:   - 45 minutes standard aphasia assessment  - 60 minutes scoring, report write-up, and modification to POC

## 2025-07-12 DIAGNOSIS — I63.50 CEREBROVASCULAR ACCIDENT (CVA) DUE TO STENOSIS OF CEREBRAL ARTERY (HCC): ICD-10-CM

## 2025-07-14 ENCOUNTER — APPOINTMENT (OUTPATIENT)
Age: 55
End: 2025-07-14
Attending: PHYSICIAN ASSISTANT
Payer: COMMERCIAL

## 2025-07-14 RX ORDER — ATORVASTATIN CALCIUM 80 MG/1
80 TABLET, FILM COATED ORAL DAILY
Qty: 90 TABLET | Refills: 1 | Status: SHIPPED | OUTPATIENT
Start: 2025-07-14

## 2025-07-16 ENCOUNTER — OFFICE VISIT (OUTPATIENT)
Age: 55
End: 2025-07-16
Attending: PHYSICIAN ASSISTANT
Payer: COMMERCIAL

## 2025-07-16 DIAGNOSIS — I69.320 APHASIA AS LATE EFFECT OF CEREBROVASCULAR ACCIDENT (CVA): Primary | ICD-10-CM

## 2025-07-16 DIAGNOSIS — I63.50 CEREBROVASCULAR ACCIDENT (CVA) DUE TO STENOSIS OF CEREBRAL ARTERY (HCC): ICD-10-CM

## 2025-07-16 PROCEDURE — 92507 TX SP LANG VOICE COMM INDIV: CPT

## 2025-07-16 NOTE — PROGRESS NOTES
Daily Speech Treatment Note    Today's date: 2025   Patient’s name: Severiano Ball  : 1970  MRN: [id number]  Safety measures: Hx of CVA, hyperlipidemia, asthma   Referring provider: Selina Perez,*    Encounter Diagnosis     ICD-10-CM    1. 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 Cerebrovascular accident (CVA) due to stenosis of cerebral artery (HCC)  I63.50         Visit Tracking:  POC expires Unit limit Auth Expiration date PT/OT + Visit Limit?   25 N/A 2025 18   25 N/A  25 18                                 Visit/Unit Tracking  AUTH Status:  Date    PU  6/30  7/2 7/9  7/10  RE             Yes Used  6  1  2 3  4  1             Remaining   4  9  8 7  6  (8)  9  (7)                Subjective/Behavioral:  -Patient pleasant, engaged, and cooperative. Patient attended today's session unaccompanied. No new concerns reported.     Objective/Assessment:  -Reviewed testing results and goals in plan care with patient. Patient is in agreement at this time.  -Discussed results and d/c of old STGs/addition of new ones with wife following tx.    Short-term goals:  READING  Patient will facilitate functional reading skills by matching word to picture with 60% accuracy, given max cues, in 3 out of 4 consecutive sessions, to facilitate growth of vocabulary concept comprehension, to be achieved in 10-12 weeks.  Patient will facilitate functional reading skills by matching word to phrase/function with 60% accuracy, given max cues, in 3 out of 4 consecutive sessions, to facilitate growth of vocabulary concept comprehension, to be achieved in 10-12 weeks.  Patient will facilitate functional reading skills by following basic one-step written directions (e.g., close your eyes) with 60% accuracy, given max cues, in 3 out of 4 consecutive sessions, to facilitate increased reading comprehension skills, to be achieved in 10-12 weeks.     LISTENING  Patient  will identify body parts with 60% accuracy, given max cues, in 3 out of 4 consecutive sessions, to facilitate improved receptive language skills, to be achieved in 10-12 weeks.   7/16/25: Patient identified body parts with 95% accuracy given max cues including errorless learning/immediate models, and orthographic cues - controlled targets of ten (head, foot, stomach, shoulder, leg, nose, knee, ankle, wrist, and neck). Given as HEP.      SPEAKING  Patient will provide an adequate response to confrontation naming task (i.e., what is…) with 60% accuracy, given max cues, in 3 out of 4 consecutive sessions, to facilitate increased expressive language skills, to be achieved in 10-12 weeks.  7/16/25: Patient labeled pictures of food upon confrontational presentation with 60% accuracy given max cues including models, orthographic cues, placement cues, and immediate direct verbal feedback. (Patient IND labeled with 25% accuracy.)    Patient will label body parts with 60% accuracy, given max cues, in 3 out of 4 consecutive sessions, to facilitate increased expressive language skills, to be achieved in 10-12 weeks.  7/16/25: Patient labeled ten controlled targets of body parts (same in STG #4) with 54% accuracy given max cues including errorless learning/immediate models, spaced retrieval, orthographic cues, gestures, placement cues, and immediate, direct verbal feedback. Given as HEP.    Plan:  -Patient was provided with home exercises/activities to target goals in plan of care at the end of today's session.  -Discussed session and patient's progress with caregiver/family member after today's session.  -Continue with current plan of care.

## 2025-07-21 ENCOUNTER — OFFICE VISIT (OUTPATIENT)
Age: 55
End: 2025-07-21
Attending: PHYSICIAN ASSISTANT
Payer: COMMERCIAL

## 2025-07-21 DIAGNOSIS — I69.320 APHASIA AS LATE EFFECT OF CEREBROVASCULAR ACCIDENT (CVA): Primary | ICD-10-CM

## 2025-07-21 DIAGNOSIS — I63.50 CEREBROVASCULAR ACCIDENT (CVA) DUE TO STENOSIS OF CEREBRAL ARTERY (HCC): ICD-10-CM

## 2025-07-21 PROCEDURE — 92507 TX SP LANG VOICE COMM INDIV: CPT

## 2025-07-21 NOTE — PROGRESS NOTES
"Daily Speech Treatment Note    Today's date: 2025   Patient’s name: Severiano Ball  : 1970  MRN: [id number]  Safety measures: Hx of CVA, hyperlipidemia, asthma   Referring provider: Selina Perez,*    Encounter Diagnosis     ICD-10-CM    1. 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 Cerebrovascular accident (CVA) due to stenosis of cerebral artery (HCC)  I63.50           Visit Tracking:  POC expires Unit limit Auth Expiration date PT/OT + Visit Limit?   25 N/A 2025 18   25 N/A  25 18                                 Visit/Unit Tracking  AUTH Status:  Date    PU  6/30  7/2 7/9  7/10  RE      RE       Yes Used  6  1  2 3  4  1  2           Remaining   4  9  8 7  6  (8)  9  (7)  8  (6)              Subjective/Behavioral:  -Patient pleasant, engaged, and cooperative. Patient attended today's session unaccompanied. No new concerns reported. When asked if he feels his speech is getting any better, patient responded, \"a little bit\".     Objective/Assessment: Patient demonstrated fast processing speech with matching word-picture tasks; significant increase in neologisms noted during spontaneous conversational speech today.     Short-term goals:  READING  Patient will facilitate functional reading skills by matching word to picture with 60% accuracy, given max cues, in 3 out of 4 consecutive sessions, to facilitate growth of vocabulary concept comprehension, to be achieved in 10-12 weeks.  25: Patient matched letters from a F.O. 6 with 98% accuracy, IND. Patient then identified spoken letters from a F.O. 6 with 40% accuracy given min gesture or orthographic cues; increasing to 100% accuracy given mod verbal, orthographic, and gesture cues. Patient then matched words to pictures in a F.O. 4 with 100% accuracy with orthographic cues. When orthographic cues were pulled, patient matched written word to a picture in a F.O. 4 with 50% " accuracy, IND, increasing to 100% given min gesture cues. Letters given for HEP.     Patient will facilitate functional reading skills by matching word to phrase/function with 60% accuracy, given max cues, in 3 out of 4 consecutive sessions, to facilitate growth of vocabulary concept comprehension, to be achieved in 10-12 weeks.  Patient will facilitate functional reading skills by following basic one-step written directions (e.g., close your eyes) with 60% accuracy, given max cues, in 3 out of 4 consecutive sessions, to facilitate increased reading comprehension skills, to be achieved in 10-12 weeks.     LISTENING  Patient will identify body parts with 60% accuracy, given max cues, in 3 out of 4 consecutive sessions, to facilitate improved receptive language skills, to be achieved in 10-12 weeks.   7/16/25: Patient identified body parts with 95% accuracy given max cues including errorless learning/immediate models, and orthographic cues - controlled targets of ten (head, foot, stomach, shoulder, leg, nose, knee, ankle, wrist, and neck). Given as HEP.      SPEAKING  Patient will provide an adequate response to confrontation naming task (i.e., what is…) with 60% accuracy, given max cues, in 3 out of 4 consecutive sessions, to facilitate increased expressive language skills, to be achieved in 10-12 weeks.  7/16/25: Patient labeled pictures of food upon confrontational presentation with 60% accuracy given max cues including models, orthographic cues, placement cues, and immediate direct verbal feedback. (Patient IND labeled with 25% accuracy.)  7/21/25: Patient labeled food used in word-picture matching task above with 67% accuracy given max cues including clinician models/errorless learning.     Patient will label body parts with 60% accuracy, given max cues, in 3 out of 4 consecutive sessions, to facilitate increased expressive language skills, to be achieved in 10-12 weeks.  7/16/25: Patient labeled ten controlled  targets of body parts (same in STG #4) with 54% accuracy given max cues including errorless learning/immediate models, spaced retrieval, orthographic cues, gestures, placement cues, and immediate, direct verbal feedback. Given as HEP.      Plan:  -Patient was provided with home exercises/activities to target goals in plan of care at the end of today's session.  -Discussed session and patient's progress with caregiver/family member after today's session.  -Continue with current plan of care.

## 2025-07-23 ENCOUNTER — EVALUATION (OUTPATIENT)
Age: 55
End: 2025-07-23
Attending: PHYSICIAN ASSISTANT
Payer: COMMERCIAL

## 2025-07-23 DIAGNOSIS — I63.50 CEREBROVASCULAR ACCIDENT (CVA) DUE TO STENOSIS OF CEREBRAL ARTERY (HCC): ICD-10-CM

## 2025-07-23 DIAGNOSIS — I69.320 APHASIA AS LATE EFFECT OF CEREBROVASCULAR ACCIDENT (CVA): Primary | ICD-10-CM

## 2025-07-23 PROCEDURE — 96105 ASSESSMENT OF APHASIA: CPT

## 2025-07-23 NOTE — PROGRESS NOTES
"Speech-Language Pathology Re-Evaluation    Today's date: 2025   Patient’s name: Severiano Ball  : 1970  MRN: [id number]  Safety measures: Hx of CVA, hyperlipidemia, asthma   Referring provider: Selina Perez,*    Encounter Diagnosis     ICD-10-CM    1. 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 Cerebrovascular accident (CVA) due to stenosis of cerebral artery (HCC)  I63.50           Assessment:   Patient continues to present with severe fluent aphasia c/b significant deficits in both receptive and expressive language, including the ability to identify and name common/functional items, follow simple and complex, multi-step commands, answer Y/N questions, relay personal information,convey a medical emergency, state automatic sequences (e.g. days of the week), complete confrontational naming, significantly reduced semantic fluency, significantly reduced auditory comprehension for longer utterances, and significant limitations contributing to conversations 2/2 language processing and verbal expression deficits. Patient was administered portions of the Western Aphasia Battery-Revised (WAB-R) this date of service, which is designed to assess a patient’s linguistic skills, such as speech content, fluency, auditory comprehension, repetition, naming, reading, and writing. The WAB-R also measures a patient’s nonlinguistic skills, including drawing, calculation, block design, and apraxia. Due to time constraints, only portions of the assessment were given, thereby limiting total score information and aphasia type description. Patient demonstrated significant frustration 2/2 limitations in completing tasks; he benefited from verbal encouragement to attempt productions 2/2 to immediate responses of \"I don't know\" prior to complete administration of testing items at times. Patient received a total score of 7/29 for Spontaneous conversation; 30/60 for Yes/No questions; 15/60 " for Auditory Word Recognition; and 0/10 for Sentence Completion. Patient continuing to make slow and limited progress across goals. Patient's recommended frequency remains at 3-4x weekly; however, to extraneous personal factors, patient can only attend 1x weekly for OP ST services. Completion of daily HEP emphasized; patient and wife in agreement. Patient would benefit from continued outpatient skilled Speech Therapy services to support the highest level of independence with his PLOF, rehabilitate expressive/receptive language skills, promote positive communication interactions with both familiar & unfamiliar listeners, patient and caregiver education/training in compensatory strategies, increase communication of wants/needs, utilize expressive/receptive language within functional activities, participate in meaningful activities, allow for increased socialization, promote safety, reduce caregiver burden, and facilitate overall improved quality of life.        Short-term goals:  Patient will answer basic/simple Y/N questions with 80% accuracy, given mod fading to min cues, to facilitate increased auditory comprehension skills, to be achieved in 10-12 weeks. ONGOING   Patient will follow one-step verbal directions (e.g., close your eyes) with 80% accuracy, given max fading to mod cues, to facilitate increased auditory comprehension skills, to be achieved in 10-12 weeks. ONGOING   Patient will complete naming of automatic tasks (e.g., counting, listing days of the week/months of the year, etc.) with 80% accuracy given max fading to mod cues to facilitate increased verbal expression skills, to be achieved in 10-12 weeks. ONGOING   Patient will relay personal information (e.g. name, date of birth, address), w/ or w/o the use of MADISON principles, with 80% accuracy given max fading to mod cues, to facilitate increased verbal expression skills, to be achieved in 10-12 weeks. ONGOING   Patient will receptively identify  common/functional items in a field of 3 increasing to 4 pictures, with 80% accuracy given max cues, in 3 out of 4 consecutive sessions, to be achieved in 10-12 weeks. ONGOING     Long-term goals:  Patient will demonstrate improved expressive and receptive language skills during structured and unstructured tasks by discharge. ONGOING  Patient will improve ability to facilitate communication to meet needs including use of compensatory strategies to promote meaningful interactions for improved quality of life and maximize level of independence. ONGOING         Plan:  Patient would benefit from outpatient skilled Speech Therapy services: Speech-language therapy    Frequency: 1-4x weekly  Duration: 3 months    Intervention certification from: 7/21/2025  Intervention certification to: 10/21/2025      Subjective:  -Patient pleasant, engaged, and cooperative. Patient attended today's session unaccompanied. Patient's wife reported change in insurance beginning 7/25/25; related to FDCs; no other new concerns reported.       Patient's goal(s): Via patient interview - to talk better, understand other people more    Pain: Absent (scale: N/A)      Objective (testing):  The Western Aphasia Battery-Revised (WAB-R) is designed to evaluate a patient's language function following CVA, dementia, or other acquired neurological disorder. It measures a patient’s linguistic skills, such as speech content, fluency, auditory comprehension, repetition, naming, reading, and writing. The WAB-R also measures a patient’s nonlinguistic skills, including drawing, calculation, block design, and apraxia. The purpose of this standardized assessment is to (1) determine the presence, severity, and type of aphasia; (2) measure the patient's level of performance to provide a baseline for detecting change over time; (3) provide a comprehensive assessment of the patient's language assets and deficits in order to guide treatment and management; and (4) infer  the location and etiology of the lesion causing aphasia. The following results were obtained during the administration of the assessment:     PART 1: Score:   -Spontaneous Speech: 7/20   -Auditory Verbal Comprehension: N/A 2/2 time; subtest scores recorded below   Yes/No Questions 30/60   Auditory Word Recognition 15/60   -Repetition: DNT 2/2 time   -Naming & Word Finding: DNT 2/2 time   Sentence Completion 0/10       Due to time constraints, only portions of the standardized assessment were administered on this date of service.    PART 2: Score:   -Reading Score: DNT 2/2 time   -Writing: DNT 2/2 time   -Apraxia: DNT 2/2 time   -Constructional, Visuospatial, & Calculation: DNT 2/2 time        Score:   *Aphasia Quotient (AQ): N/A   *Language Quotient (LQ): N/A   *Cortical Quotient (CQ): N/A                  Visit Tracking:  POC expires Unit limit Auth Expiration date PT/OT + Visit Limit?   5/5/25 N/A 6/5/2025 18   7/21/25 N/A  8/28/25 18                                 Visit/Unit Tracking  AUTH Status:  Date  6/25  PU  6/30  7/2 7/9  7/10  RE  7/16 7/21 7/23  RE       Yes Used  6  1  2 3  4  1  2  3         Remaining   4  9  8 7  6  (8)  9  (7)  8  (6)  7  (5)            Intervention comments:  - 45 minutes standard assessment of aphasia

## 2025-07-23 NOTE — PROGRESS NOTES
Daily Speech Treatment Note    Today's date: 3/10/2025   Patient’s name: Severiano Ball  : 1970  MRN: [id number]  Safety measures: Hx of CVA, hyperlipidemia, asthma   Referring provider: Selina Perez,*    Encounter Diagnosis     ICD-10-CM    1. 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 Cerebrovascular accident (CVA) due to stenosis of cerebral artery (HCC)  I63.50           Visit Tracking:  POC expires Unit limit Auth Expiration date PT/OT + Visit Limit?   25 N/A 2025 18                                           Visit/Unit Tracking  AUTH Status:  Date   IE  2/12  2/19  2/25  2/28  3/3  3/5  PU  3/10           Yes Used 1  2  3  4  5  6  7  1             Remaining  17  16  15  14  13  12  11  10                Subjective/Behavioral:  -Patient pleasant, engaged, and cooperative. Patient attended today's session unaccompanied. No new concerns reported.       Objective/Assessment:  -Reviewed testing results and goals in plan care with patient. Patient is in agreement at this time. Patient benefited from structured breaks/breathing exercises to mitigate anger/frustration experienced 2/2 incorrect productions. Patient ended days of the week task 2/2 difficulty producing correct responses after several consecutive trials; updated wife at this time.     Short-term goals:  Patient will answer basic/simple Y/N questions with 60% accuracy, given max cues, to facilitate increased auditory comprehension skills, to be achieved in 10-12 weeks.   3/10/25: Patient answered basic personal Y/N questions with 50% accuracy, IND! Patient answered Y/N questions with 80% accuracy given max cues including models and orthographic cues.    Patient will follow one-step verbal directions (e.g., close your eyes) with 60% accuracy, given max fading to mod cues, to facilitate increased auditory comprehension skills, to be achieved in 10-12 weeks.   Patient will complete naming of  automatic tasks (e.g., counting, listing days of the week/months of the year, etc.) with 100% accuracy given max cues to facilitate increased verbal expression skills, to be achieved in 6-8 weeks.   3/10/25: Patient stated the days of the weeks with 67% accuracy given max cues including errorless learning, orthographic cues, and visuals for pacing. Patient stated the months of the year with 0% accuracy given max cues. Given as HEP.    Patient will relay personal information (e.g. name, date of birth, address), w/ or w/o the use of MADISON principles, with 100% accuracy given max cues, to facilitate increased verbal expression skills, to be achieved in 10-12 weeks.   3/10/25: Patient relayed name in 2/2 opportunities with mod verbal and visual cues. He stated his age with 0% accuracy given max cues including models, orthographic cues, and backward chaining. Deferred task and ended tx a few minutes early 2/2 increased anger/frustration with task.     Plan:  -Patient was provided with home exercises/activities to target goals in plan of care at the end of today's session.  -Discussed session and patient's progress with caregiver/family member after today's session.  -Continue with current plan of care.;   Detail Level: Simple Detail Level: Zone Include Location In Plan?: Yes Hide Include Location In Plan Question?: No

## 2025-07-25 DIAGNOSIS — I63.50 CEREBROVASCULAR ACCIDENT (CVA) DUE TO STENOSIS OF CEREBRAL ARTERY (HCC): ICD-10-CM

## 2025-07-28 ENCOUNTER — APPOINTMENT (OUTPATIENT)
Age: 55
End: 2025-07-28
Attending: PHYSICIAN ASSISTANT
Payer: COMMERCIAL

## 2025-07-28 RX ORDER — ATORVASTATIN CALCIUM 80 MG/1
80 TABLET, FILM COATED ORAL DAILY
Qty: 90 TABLET | Refills: 0 | OUTPATIENT
Start: 2025-07-28

## 2025-07-30 ENCOUNTER — APPOINTMENT (OUTPATIENT)
Age: 55
End: 2025-07-30
Attending: PHYSICIAN ASSISTANT
Payer: COMMERCIAL

## (undated) DEVICE — ELECTRODE LAP L WIRE E-Z CLEAN 33CM -0100

## (undated) DEVICE — ENDO STITCH DEVICE 10 MM

## (undated) DEVICE — SCD SEQUENTIAL COMPRESSION COMFORT SLEEVE MEDIUM KNEE LENGTH: Brand: KENDALL SCD

## (undated) DEVICE — GLOVE INDICATOR PI UNDERGLOVE SZ 7 BLUE

## (undated) DEVICE — [HIGH FLOW INSUFFLATOR,  DO NOT USE IF PACKAGE IS DAMAGED,  KEEP DRY,  KEEP AWAY FROM SUNLIGHT,  PROTECT FROM HEAT AND RADIOACTIVE SOURCES.]: Brand: PNEUMOSURE

## (undated) DEVICE — ELECTRODE BLADE MOD E-Z CLEAN  2.75IN 7CM -0012AM

## (undated) DEVICE — ALLENTOWN LAP CHOLE APP PACK: Brand: CARDINAL HEALTH

## (undated) DEVICE — TROCAR: Brand: KII FIOS FIRST ENTRY

## (undated) DEVICE — 3M™ TEGADERM™ TRANSPARENT FILM DRESSING FRAME STYLE, 1624W, 2-3/8 IN X 2-3/4 IN (6 CM X 7 CM), 100/CT 4CT/CASE: Brand: 3M™ TEGADERM™

## (undated) DEVICE — SUT VICRYL 0 UR-6 27 IN J603H

## (undated) DEVICE — INTENDED FOR TISSUE SEPARATION, AND OTHER PROCEDURES THAT REQUIRE A SHARP SURGICAL BLADE TO PUNCTURE OR CUT.: Brand: BARD-PARKER SAFETY BLADES SIZE 15, STERILE

## (undated) DEVICE — COTTON TIP APPLICTOR 2 PK

## (undated) DEVICE — PAD GROUNDING ADULT

## (undated) DEVICE — CHLORAPREP HI-LITE 26ML ORANGE

## (undated) DEVICE — SUT VLOC 180  0 8IN  VLOCA008L

## (undated) DEVICE — ENDOPATH XCEL UNIVERSAL TROCAR STABLILITY SLEEVES: Brand: ENDOPATH XCEL

## (undated) DEVICE — 3M™ STERI-STRIP™ REINFORCED ADHESIVE SKIN CLOSURES, R1542, 1/4 IN X 1-1/2 IN (6 MM X 38 MM), 6 STRIPS/ENVELOPE: Brand: 3M™ STERI-STRIP™

## (undated) DEVICE — GLOVE SRG BIOGEL ECLIPSE 7.5

## (undated) DEVICE — LIGHT HANDLE COVER SLEEVE DISP BLUE STELLAR

## (undated) DEVICE — GLOVE INDICATOR PI UNDERGLOVE SZ 7.5 BLUE

## (undated) DEVICE — DRAPE EQUIPMENT RF WAND

## (undated) DEVICE — SUT VICRYL 4-0 PS-2 27 IN J426H

## (undated) DEVICE — TROCAR: Brand: KII SLEEVE

## (undated) DEVICE — GLOVE SRG BIOGEL 7

## (undated) DEVICE — TUBING SMOKE EVAC W/FILTRATION DEVICE PLUMEPORT ACTIV

## (undated) DEVICE — 3M™ STERI-STRIP™ REINFORCED ADHESIVE SKIN CLOSURES, R1546, 1/4 IN X 4 IN (6 MM X 100 MM), 10 STRIPS/ENVELOPE: Brand: 3M™ STERI-STRIP™

## (undated) DEVICE — PLUMEPEN PRO 10FT

## (undated) DEVICE — ENDOPATH PNEUMONEEDLE INSUFFLATION NEEDLES WITH LUER LOCK CONNECTORS 120MM: Brand: ENDOPATH

## (undated) DEVICE — PACK PBDS PLASTIC HEAD AND NECK RF

## (undated) DEVICE — INTENDED FOR TISSUE SEPARATION, AND OTHER PROCEDURES THAT REQUIRE A SHARP SURGICAL BLADE TO PUNCTURE OR CUT.: Brand: BARD-PARKER SAFETY BLADES SIZE 10, STERILE

## (undated) DEVICE — GAUZE SPONGES,8 PLY: Brand: CURITY